# Patient Record
Sex: FEMALE | Race: WHITE | NOT HISPANIC OR LATINO | Employment: OTHER | URBAN - METROPOLITAN AREA
[De-identification: names, ages, dates, MRNs, and addresses within clinical notes are randomized per-mention and may not be internally consistent; named-entity substitution may affect disease eponyms.]

---

## 2017-01-19 ENCOUNTER — HOSPITAL ENCOUNTER (OUTPATIENT)
Dept: OTHER | Facility: HOSPITAL | Age: 72
Discharge: HOME | End: 2017-01-19
Attending: FAMILY MEDICINE | Admitting: FAMILY MEDICINE

## 2018-01-29 ENCOUNTER — HOSPITAL ENCOUNTER (OUTPATIENT)
Dept: OTHER | Facility: HOSPITAL | Age: 73
Discharge: HOME | End: 2018-01-29
Attending: FAMILY MEDICINE | Admitting: FAMILY MEDICINE

## 2018-04-16 ENCOUNTER — OFFICE (OUTPATIENT)
Dept: URBAN - METROPOLITAN AREA CLINIC 64 | Facility: CLINIC | Age: 73
End: 2018-04-16

## 2018-04-16 VITALS
WEIGHT: 160 LBS | HEART RATE: 57 BPM | SYSTOLIC BLOOD PRESSURE: 159 MMHG | HEIGHT: 65 IN | DIASTOLIC BLOOD PRESSURE: 84 MMHG

## 2018-04-16 DIAGNOSIS — Z86.010 PERSONAL HISTORY OF COLONIC POLYPS: ICD-10-CM

## 2018-04-16 DIAGNOSIS — R13.19 OTHER DYSPHAGIA: ICD-10-CM

## 2018-04-16 PROCEDURE — 99203 OFFICE O/P NEW LOW 30 MIN: CPT | Performed by: INTERNAL MEDICINE

## 2018-04-16 RX ORDER — ESOMEPRAZOLE MAGNESIUM 20 MG/1
20 CAPSULE, DELAYED RELEASE ORAL
Qty: 0 | Refills: 0 | Status: COMPLETED
End: 2018-04-16

## 2018-04-16 RX ORDER — OMEPRAZOLE 40 MG/1
40 CAPSULE, DELAYED RELEASE ORAL
Qty: 90 | Refills: 3 | Status: ACTIVE
Start: 2018-04-16

## 2018-05-17 ENCOUNTER — HOSPITAL ENCOUNTER (OUTPATIENT)
Dept: PREOP | Facility: HOSPITAL | Age: 73
Setting detail: HOSPITAL OUTPATIENT SURGERY
Discharge: HOME | End: 2018-05-17
Attending: INTERNAL MEDICINE | Admitting: INTERNAL MEDICINE

## 2018-05-17 ENCOUNTER — ON CAMPUS - OUTPATIENT (OUTPATIENT)
Dept: URBAN - METROPOLITAN AREA HOSPITAL 85 | Facility: HOSPITAL | Age: 73
End: 2018-05-17
Payer: COMMERCIAL

## 2018-05-17 DIAGNOSIS — K44.9 DIAPHRAGMATIC HERNIA WITHOUT OBSTRUCTION OR GANGRENE: ICD-10-CM

## 2018-05-17 DIAGNOSIS — Z86.010 PERSONAL HISTORY OF COLONIC POLYPS: ICD-10-CM

## 2018-05-17 DIAGNOSIS — K21.9 GASTRO-ESOPHAGEAL REFLUX DISEASE WITHOUT ESOPHAGITIS: ICD-10-CM

## 2018-05-17 DIAGNOSIS — K22.2 ESOPHAGEAL OBSTRUCTION: ICD-10-CM

## 2018-05-17 DIAGNOSIS — R13.10 DYSPHAGIA, UNSPECIFIED: ICD-10-CM

## 2018-05-17 DIAGNOSIS — K63.5 POLYP OF COLON: ICD-10-CM

## 2018-05-17 DIAGNOSIS — K62.89 OTHER SPECIFIED DISEASES OF ANUS AND RECTUM: ICD-10-CM

## 2018-05-17 DIAGNOSIS — K57.30 DIVERTICULOSIS OF LARGE INTESTINE WITHOUT PERFORATION OR ABS: ICD-10-CM

## 2018-05-17 DIAGNOSIS — K64.1 SECOND DEGREE HEMORRHOIDS: ICD-10-CM

## 2018-05-17 PROCEDURE — 45380 COLONOSCOPY AND BIOPSY: CPT | Mod: PT | Performed by: INTERNAL MEDICINE

## 2018-05-17 PROCEDURE — 43235 EGD DIAGNOSTIC BRUSH WASH: CPT | Mod: 59 | Performed by: INTERNAL MEDICINE

## 2018-05-17 PROCEDURE — 43450 DILATE ESOPHAGUS 1/MULT PASS: CPT | Performed by: INTERNAL MEDICINE

## 2018-06-26 ENCOUNTER — CONVERSION ENCOUNTER (OUTPATIENT)
Dept: FAMILY MEDICINE CLINIC | Facility: CLINIC | Age: 73
End: 2018-06-26

## 2018-06-26 ENCOUNTER — HOSPITAL ENCOUNTER (OUTPATIENT)
Dept: GENERAL RADIOLOGY | Facility: HOSPITAL | Age: 73
Discharge: HOME | End: 2018-06-26
Attending: FAMILY MEDICINE | Admitting: FAMILY MEDICINE

## 2018-06-26 LAB
ALBUMIN SERPL-MCNC: 4.3 G/DL (ref 3.6–5.1)
ALP SERPL-CCNC: 60 U/L (ref 33–130)
AST SERPL-CCNC: 20 U/L (ref 10–35)
BILIRUB SERPL-MCNC: 0.5 MG/DL (ref 0.2–1.2)
BUN SERPL-MCNC: 17 MG/DL (ref 7–25)
BUN/CREAT SERPL: 12.1 (CALC) (ref 6–22)
CALCIUM SERPL-MCNC: 9.8 MG/DL (ref 8.6–10.2)
CHLORIDE SERPL-SCNC: 104 MMOL/L (ref 98–110)
CONV CO2: 29 MMOL/L (ref 21–33)
CONV TOTAL PROTEIN: 6.4 G/DL (ref 6.2–8.3)
CREAT UR-MCNC: 1.4 MG/DL (ref 0.63–1.22)
GLOBULIN UR ELPH-MCNC: 2.1 MG/DL (ref 2.2–3.9)
GLUCOSE UR QL: 100 MG/DL (ref 65–99)
INSULIN SERPL-ACNC: 2 (CALC) (ref 1–2.1)
POTASSIUM SERPL-SCNC: 4.2 MMOL/L (ref 3.5–5.3)
SODIUM SERPL-SCNC: 140 MMOL/L (ref 135–146)

## 2018-07-12 ENCOUNTER — CONVERSION ENCOUNTER (OUTPATIENT)
Dept: FAMILY MEDICINE CLINIC | Facility: CLINIC | Age: 73
End: 2018-07-12

## 2018-07-13 LAB
ALBUMIN SERPL-MCNC: 4.3 G/DL (ref 3.6–5.1)
ALP SERPL-CCNC: 74 U/L (ref 33–130)
ALT SERPL-CCNC: 16 U/L (ref 6–29)
AST SERPL-CCNC: 26 U/L (ref 10–35)
BILIRUB SERPL-MCNC: 0.3 MG/DL (ref 0.2–1.2)
BUN SERPL-MCNC: 20 MG/DL (ref 7–25)
BUN/CREAT SERPL: 17 (CALC) (ref 6–22)
CALCIUM SERPL-MCNC: 9.3 MG/DL (ref 8.6–10.4)
CHLORIDE SERPL-SCNC: 101 MMOL/L (ref 98–110)
CONV CO2: 27 MMOL/L (ref 20–31)
CONV TOTAL PROTEIN: 6.6 G/DL (ref 6.1–8.1)
CREAT UR-MCNC: 1.21 MG/DL (ref 0.6–0.93)
GLOBULIN UR ELPH-MCNC: 2.3 MG/DL (ref 1.9–3.7)
GLUCOSE UR QL: 92 MG/DL (ref 65–99)
INSULIN SERPL-ACNC: 1.9 (CALC) (ref 1–2.5)
POTASSIUM SERPL-SCNC: 4.2 MMOL/L (ref 3.5–5.3)
SODIUM SERPL-SCNC: 137 MMOL/L (ref 135–146)

## 2018-08-13 ENCOUNTER — CONVERSION ENCOUNTER (OUTPATIENT)
Dept: FAMILY MEDICINE CLINIC | Facility: CLINIC | Age: 73
End: 2018-08-13

## 2018-08-13 LAB
ALBUMIN SERPL-MCNC: 4.3 G/DL (ref 3.6–5.1)
ALP SERPL-CCNC: 58 U/L (ref 33–130)
AST SERPL-CCNC: 22 U/L (ref 10–35)
BILIRUB SERPL-MCNC: 0.6 MG/DL (ref 0.2–1.2)
BUN SERPL-MCNC: 14 MG/DL (ref 7–25)
BUN/CREAT SERPL: 12.7 (CALC) (ref 6–22)
CALCIUM SERPL-MCNC: 9.8 MG/DL (ref 8.6–10.2)
CHLORIDE SERPL-SCNC: 103 MMOL/L (ref 98–110)
CONV CO2: 30 MMOL/L (ref 21–33)
CONV TOTAL PROTEIN: 6.4 G/DL (ref 6.2–8.3)
CREAT UR-MCNC: 1.1 MG/DL (ref 0.63–1.22)
GLOBULIN UR ELPH-MCNC: 2.1 MG/DL (ref 2.2–3.9)
GLUCOSE UR QL: 88 MG/DL (ref 65–99)
INSULIN SERPL-ACNC: 2 (CALC) (ref 1–2.1)
POTASSIUM SERPL-SCNC: 4.1 MMOL/L (ref 3.5–5.3)
SODIUM SERPL-SCNC: 139 MMOL/L (ref 135–146)

## 2019-02-12 ENCOUNTER — HOSPITAL ENCOUNTER (OUTPATIENT)
Dept: OTHER | Facility: HOSPITAL | Age: 74
Discharge: HOME | End: 2019-02-12
Attending: FAMILY MEDICINE | Admitting: FAMILY MEDICINE

## 2019-06-17 RX ORDER — ROSUVASTATIN CALCIUM 5 MG/1
TABLET, COATED ORAL
Qty: 30 TABLET | Refills: 12 | Status: SHIPPED | OUTPATIENT
Start: 2019-06-17 | End: 2020-02-04 | Stop reason: SDUPTHER

## 2019-11-12 ENCOUNTER — FLU SHOT (OUTPATIENT)
Dept: FAMILY MEDICINE CLINIC | Facility: CLINIC | Age: 74
End: 2019-11-12

## 2019-11-12 DIAGNOSIS — Z23 NEED FOR INFLUENZA VACCINATION: ICD-10-CM

## 2019-11-12 PROCEDURE — G0008 ADMIN INFLUENZA VIRUS VAC: HCPCS | Performed by: FAMILY MEDICINE

## 2019-11-12 PROCEDURE — 90653 IIV ADJUVANT VACCINE IM: CPT | Performed by: FAMILY MEDICINE

## 2020-01-28 RX ORDER — ATENOLOL 25 MG/1
TABLET ORAL
Qty: 60 TABLET | Refills: 12 | OUTPATIENT
Start: 2020-01-28

## 2020-02-03 ENCOUNTER — OFFICE VISIT (OUTPATIENT)
Dept: FAMILY MEDICINE CLINIC | Facility: CLINIC | Age: 75
End: 2020-02-03

## 2020-02-03 VITALS
HEIGHT: 65 IN | SYSTOLIC BLOOD PRESSURE: 130 MMHG | WEIGHT: 160.8 LBS | OXYGEN SATURATION: 98 % | RESPIRATION RATE: 18 BRPM | BODY MASS INDEX: 26.79 KG/M2 | HEART RATE: 78 BPM | TEMPERATURE: 97.7 F | DIASTOLIC BLOOD PRESSURE: 70 MMHG

## 2020-02-03 DIAGNOSIS — L98.9 FACIAL LESION: ICD-10-CM

## 2020-02-03 DIAGNOSIS — R35.0 URINARY FREQUENCY: ICD-10-CM

## 2020-02-03 DIAGNOSIS — Z11.59 NEED FOR HEPATITIS C SCREENING TEST: ICD-10-CM

## 2020-02-03 DIAGNOSIS — E66.3 OVERWEIGHT: ICD-10-CM

## 2020-02-03 DIAGNOSIS — G43.809 VARIANTS OF MIGRAINE: ICD-10-CM

## 2020-02-03 DIAGNOSIS — Z12.31 ENCOUNTER FOR SCREENING MAMMOGRAM FOR MALIGNANT NEOPLASM OF BREAST: ICD-10-CM

## 2020-02-03 DIAGNOSIS — E78.2 MIXED HYPERLIPIDEMIA: ICD-10-CM

## 2020-02-03 DIAGNOSIS — R93.1 ELEVATED CORONARY ARTERY CALCIUM SCORE: ICD-10-CM

## 2020-02-03 DIAGNOSIS — K57.30 DIVERTICULOSIS OF LARGE INTESTINE WITHOUT HEMORRHAGE: ICD-10-CM

## 2020-02-03 DIAGNOSIS — Z12.4 CERVICAL CANCER SCREENING: ICD-10-CM

## 2020-02-03 DIAGNOSIS — I10 HYPERTENSION, BENIGN: ICD-10-CM

## 2020-02-03 DIAGNOSIS — M85.89 OSTEOPENIA OF MULTIPLE SITES: ICD-10-CM

## 2020-02-03 DIAGNOSIS — R92.2 DENSE BREAST: ICD-10-CM

## 2020-02-03 DIAGNOSIS — K22.2 GASTROESOPHAGEAL REFLUX DISEASE WITH STRICTURE: ICD-10-CM

## 2020-02-03 DIAGNOSIS — M15.9 GENERALIZED OSTEOARTHROSIS: ICD-10-CM

## 2020-02-03 DIAGNOSIS — N18.30 CHRONIC RENAL FAILURE, STAGE 3 (MODERATE) (HCC): ICD-10-CM

## 2020-02-03 DIAGNOSIS — K21.9 GASTROESOPHAGEAL REFLUX DISEASE WITH STRICTURE: ICD-10-CM

## 2020-02-03 DIAGNOSIS — Z00.00 MEDICARE ANNUAL WELLNESS VISIT, SUBSEQUENT: Primary | ICD-10-CM

## 2020-02-03 DIAGNOSIS — Z12.11 COLON CANCER SCREENING: ICD-10-CM

## 2020-02-03 PROBLEM — K44.9 HIATAL HERNIA: Status: ACTIVE | Noted: 2018-05-17

## 2020-02-03 LAB
BILIRUB BLD-MCNC: NEGATIVE MG/DL
CLARITY, POC: CLEAR
COLOR UR: YELLOW
GLUCOSE UR STRIP-MCNC: NEGATIVE MG/DL
KETONES UR QL: NEGATIVE
LEUKOCYTE EST, POC: NEGATIVE
NITRITE UR-MCNC: NEGATIVE MG/ML
PH UR: 5 [PH] (ref 5–8)
PROT UR STRIP-MCNC: NEGATIVE MG/DL
RBC # UR STRIP: ABNORMAL /UL
SP GR UR: 1.01 (ref 1–1.03)
UROBILINOGEN UR QL: NORMAL

## 2020-02-03 PROCEDURE — 81002 URINALYSIS NONAUTO W/O SCOPE: CPT | Performed by: FAMILY MEDICINE

## 2020-02-03 PROCEDURE — G0444 DEPRESSION SCREEN ANNUAL: HCPCS | Performed by: FAMILY MEDICINE

## 2020-02-03 PROCEDURE — 99213 OFFICE O/P EST LOW 20 MIN: CPT | Performed by: FAMILY MEDICINE

## 2020-02-03 PROCEDURE — 36415 COLL VENOUS BLD VENIPUNCTURE: CPT | Performed by: FAMILY MEDICINE

## 2020-02-03 PROCEDURE — G0439 PPPS, SUBSEQ VISIT: HCPCS | Performed by: FAMILY MEDICINE

## 2020-02-03 RX ORDER — ATENOLOL 25 MG/1
25 TABLET ORAL DAILY
Qty: 90 TABLET | Refills: 4 | Status: SHIPPED | OUTPATIENT
Start: 2020-02-03 | End: 2020-03-09 | Stop reason: SDUPTHER

## 2020-02-03 RX ORDER — ATENOLOL 25 MG/1
1 TABLET ORAL DAILY
COMMUNITY
Start: 2019-12-30 | End: 2020-02-03 | Stop reason: SDUPTHER

## 2020-02-03 RX ORDER — MONTELUKAST SODIUM 10 MG/1
1 TABLET ORAL DAILY
COMMUNITY
Start: 2020-01-27 | End: 2020-05-01

## 2020-02-03 RX ORDER — ASPIRIN 81 MG/1
81 TABLET ORAL DAILY
COMMUNITY

## 2020-02-03 RX ORDER — CETIRIZINE HYDROCHLORIDE 10 MG/1
10 TABLET ORAL EVERY EVENING
COMMUNITY

## 2020-02-03 RX ORDER — OMEPRAZOLE 40 MG/1
40 CAPSULE, DELAYED RELEASE ORAL EVERY OTHER DAY
COMMUNITY
End: 2020-02-03

## 2020-02-03 RX ORDER — OMEPRAZOLE 20 MG/1
20 CAPSULE, DELAYED RELEASE ORAL DAILY
Qty: 90 CAPSULE | Refills: 4 | Status: SHIPPED | OUTPATIENT
Start: 2020-02-03 | End: 2021-05-04

## 2020-02-03 RX ORDER — LISINOPRIL 20 MG/1
1 TABLET ORAL DAILY
COMMUNITY
Start: 2020-01-27 | End: 2020-05-01

## 2020-02-03 RX ORDER — HYDROCHLOROTHIAZIDE 25 MG/1
1 TABLET ORAL DAILY
COMMUNITY
Start: 2020-01-07 | End: 2020-06-02

## 2020-02-03 NOTE — PROGRESS NOTES
The ABCs of the Annual Wellness Visit  Subsequent Medicare Wellness Visit    Chief Complaint   Patient presents with   • Medicare Wellness-subsequent       Subjective   Akosua Munguia is a 74 y.o. female who presents for a Subsequent Medicare Wellness Visit. The patient is here: for coordination of medical care to discuss health maintenance and disease prevention to follow up on multiple medical problems. Overall has: moderate activity with work/home activities, exercises 2 - 3 times per week, good appetite, feels well with no complaints, good energy level and is sleeping well. Nutrition: balanced diet. Last tetanus shot was 2014.     Chronic Kidney Disease   This is a chronic problem. The current episode started more than 1 year ago. Pertinent negatives include no abdominal pain, arthralgias, chest pain, congestion, coughing, fatigue, fever, headaches, joint swelling, myalgias, nausea, numbness, rash, sore throat, vomiting or weakness. The treatment provided significant relief.   Hypertension   This is a chronic problem. The current episode started more than 1 year ago. The problem is controlled. Pertinent negatives include no chest pain, headaches, palpitations or shortness of breath. Risk factors for coronary artery disease include post-menopausal state and dyslipidemia. Current antihypertension treatment includes beta blockers, diuretics and ACE inhibitors. The current treatment provides significant improvement.   Hyperlipidemia   This is a chronic problem. The current episode started more than 1 year ago. The problem is controlled. She has no history of diabetes or obesity. Pertinent negatives include no chest pain, myalgias or shortness of breath. Current antihyperlipidemic treatment includes statins. The current treatment provides significant improvement of lipids. Risk factors for coronary artery disease include dyslipidemia, hypertension and post-menopausal.       HEALTH RISK ASSESSMENT    Recent  Hospitalizations:  No hospitalization(s) within the last year.    Current Medical Providers:  Patient Care Team:  Jenny Keenan MD as PCP - General  Jenny Keenan MD as PCP - Claims Attributed    Smoking Status:  Social History     Tobacco Use   Smoking Status Never Smoker   Smokeless Tobacco Never Used   Tobacco Comment    No Smoke Exposure       Alcohol Consumption:  Social History     Substance and Sexual Activity   Alcohol Use Not Currently       Depression Screen:   PHQ-2/PHQ-9 Depression Screening 2/3/2020   Little interest or pleasure in doing things 0   Feeling down, depressed, or hopeless 0   Trouble falling or staying asleep, or sleeping too much 0   Feeling tired or having little energy 0   Poor appetite or overeating 0   Feeling bad about yourself - or that you are a failure or have let yourself or your family down 0   Trouble concentrating on things, such as reading the newspaper or watching television 0   Moving or speaking so slowly that other people could have noticed. Or the opposite - being so fidgety or restless that you have been moving around a lot more than usual 0   Thoughts that you would be better off dead, or of hurting yourself in some way 0   Total Score 0   If you checked off any problems, how difficult have these problems made it for you to do your work, take care of things at home, or get along with other people? Not difficult at all     I spent more than 16 minutes asking patient questions, counseling and documenting in the chart    Fall Risk Screen:  STEADI Fall Risk Assessment was completed, and patient is at LOW risk for falls.Assessment completed on:2/3/2020    Health Habits and Functional and Cognitive Screening:  Functional & Cognitive Status 2/3/2020   Do you have difficulty preparing food and eating? No   Do you have difficulty bathing yourself, getting dressed or grooming yourself? No   Do you have difficulty using the toilet? No   Do you have difficulty moving around  from place to place? No   Do you have trouble with steps or getting out of a bed or a chair? No   Current Diet Well Balanced Diet   Dental Exam Up to date   Eye Exam Not up to date   Exercise (times per week) 3 times per week   Current Exercise Activities Include Walking   Do you need help using the phone?  No   Are you deaf or do you have serious difficulty hearing?  No   Do you need help with transportation? No   Do you need help shopping? No   Do you need help preparing meals?  No   Do you need help with housework?  No   Do you need help with laundry? No   Do you need help taking your medications? No   Do you need help managing money? No   Do you ever drive or ride in a car without wearing a seat belt? No   Have you felt unusual stress, anger or loneliness in the last month? No   Who do you live with? Spouse   If you need help, do you have trouble finding someone available to you? No   Have you been bothered in the last four weeks by sexual problems? No   Do you have difficulty concentrating, remembering or making decisions? No       Does the patient have evidence of cognitive impairment? No    Asprin use counseling:Taking ASA appropriately as indicated    Recent Lab Results:  Lab Results   Component Value Date     (H) 02/03/2020    CHLPL 210 (H) 02/03/2020    TRIG 195 (H) 02/03/2020    HDL 63 02/03/2020     (H) 02/03/2020    VLDL 39 02/03/2020        Age-appropriate Screening Schedule:  Refer to the list below for future screening recommendations based on patient's age, sex and/or medical conditions. Orders for these recommended tests are listed in the plan section. The patient has been provided with a written plan.    Health Maintenance   Topic Date Due   • LIPID PANEL  02/03/2021   • MAMMOGRAM  02/12/2021   • TDAP/TD VACCINES (2 - Td) 11/05/2024   • COLONOSCOPY  05/17/2028   • PNEUMOCOCCAL VACCINE (65+ HIGH RISK)  Completed   • INFLUENZA VACCINE  Completed   • ZOSTER VACCINE  Completed           The following portions of the patient's history were reviewed and updated as appropriate: allergies, current medications, past family history, past medical history, past social history, past surgical history and problem list.    Outpatient Medications Prior to Visit   Medication Sig Dispense Refill   • aspirin 81 MG EC tablet Take 81 mg by mouth Daily.     • cetirizine (zyrTEC) 10 MG tablet Take 10 mg by mouth Daily.     • hydroCHLOROthiazide (HYDRODIURIL) 25 MG tablet Take 1 tablet by mouth Daily.     • lisinopril (PRINIVIL,ZESTRIL) 20 MG tablet Take 1 tablet by mouth Daily.     • Mirabegron ER (MYRBETRIQ) 50 MG tablet sustained-release 24 hour 24 hr tablet Take 50 mg by mouth Every Other Day.     • montelukast (SINGULAIR) 10 MG tablet Take 1 tablet by mouth Daily.     • atenolol (TENORMIN) 25 MG tablet Take 1 tablet by mouth Daily.     • omeprazole (priLOSEC) 40 MG capsule Take 40 mg by mouth Every Other Day.     • rosuvastatin (CRESTOR) 5 MG tablet TAKE ONE TABLET BY MOUTH EVERY OTHER DAY FOR CHOLESTEROL 30 tablet 12     No facility-administered medications prior to visit.        Patient Active Problem List   Diagnosis   • Acute seasonal allergic rhinitis due to pollen   • Asymptomatic menopause   • Cervical disc disorder with myelopathy   • Chronic renal failure, stage 3 (moderate) (CMS/HCC)   • Dense breast   • Diverticulosis of large intestine without hemorrhage   • Gastroesophageal reflux disease with stricture   • Generalized osteoarthrosis   • Hiatal hernia   • History of kidney stones   • Hypertension, benign   • Insomnia, organic   • Lumbar disc disorder with myelopathy   • Mixed hyperlipidemia   • Osteopenia of multiple sites   • Overweight   • Urinary incontinence in female   • Variants of migraine   • Medicare annual wellness visit, subsequent   • Encounter for screening mammogram for malignant neoplasm of breast   • Colon cancer screening   • Cervical cancer screening   • Elevated coronary artery  calcium score       Advanced Care Planning:  Patient has an advance directive - a copy has been provided and is visible in patient header    A face-to-face visit was completed today with patient.  Counseling explanation, and discussion of advanced directives was performed.   The last advanced care visit was performed in 2019.  In a near life ending situation, from which she is not expected to recover functionally, and she is not able to speak for herself, she does not want life sustaining measures. We discussed feeding tubes, ventilators and cardiac support as well as dialysis.    I spent less than 16 minutes discussing Advanced Care Planning information and documenting in the chart.    Review of Systems   Constitutional: Negative for activity change, appetite change, fatigue, fever and unexpected weight change.   HENT: Negative for congestion, hearing loss, rhinorrhea, sinus pain, sore throat, tinnitus, trouble swallowing and voice change.    Eyes: Negative for visual disturbance.   Respiratory: Negative for apnea, cough, shortness of breath and wheezing.    Cardiovascular: Negative for chest pain and palpitations.   Gastrointestinal: Negative for abdominal pain, blood in stool, constipation, diarrhea, nausea and vomiting.   Endocrine: Negative for cold intolerance, heat intolerance, polydipsia and polyuria.   Genitourinary: Negative for difficulty urinating, dysuria, flank pain, frequency and hematuria.   Musculoskeletal: Negative for arthralgias, joint swelling and myalgias.   Skin: Negative for rash.        There is a lesion on her right cheek that has been present x 1 yr, it is increasing in size it is flesh in color, she is very blue eyed and [place]    Allergic/Immunologic: Negative for environmental allergies and immunocompromised state.   Neurological: Negative for dizziness, syncope, weakness, numbness and headaches.   Hematological: Negative for adenopathy. Does not  "bruise/bleed easily.   Psychiatric/Behavioral: Negative for dysphoric mood and suicidal ideas. The patient is not nervous/anxious.        Compared to one year ago, the patient feels her physical health is the same.  Compared to one year ago, the patient feels her mental health is the same.    Reviewed chart for potential of high risk medication in the elderly: yes  Reviewed chart for potential of harmful drug interactions in the elderly:yes    Objective      Vitals:    02/03/20 0853   BP: 130/70   BP Location: Left arm   Patient Position: Sitting   Cuff Size: Adult   Pulse: 78   Resp: 18   Temp: 97.7 °F (36.5 °C)   TempSrc: Oral   SpO2: 98%   Weight: 72.9 kg (160 lb 12.8 oz)   Height: 165.1 cm (65\")       Body mass index is 26.76 kg/m².  Discussed the patient's BMI with her. The BMI is in the acceptable range.    Physical Exam   Constitutional: She is oriented to person, place, and time. She appears well-developed and well-nourished. No distress.   HENT:   Head: Normocephalic. Hair is normal.   Right Ear: Tympanic membrane and external ear normal.   Left Ear: Tympanic membrane and external ear normal.   Nose: Nose normal. No mucosal edema.   Mouth/Throat: Uvula is midline, oropharynx is clear and moist and mucous membranes are normal.   Eyes: Pupils are equal, round, and reactive to light. Conjunctivae and EOM are normal. Right eye exhibits no discharge. Left eye exhibits no discharge.   Fundi benign   Neck: Normal range of motion. Neck supple. No JVD present. No muscular tenderness present. No thyromegaly present.   Cardiovascular: Normal rate, regular rhythm and normal heart sounds. PMI is not displaced. Exam reveals no gallop and no friction rub.   No murmur heard.  Pulses:       Carotid pulses are 2+ on the right side, and 2+ on the left side.       Femoral pulses are 2+ on the right side, and 2+ on the left side.       Dorsalis pedis pulses are 2+ on the right side, and 2+ on the left side.   Pulmonary/Chest: " Effort normal and breath sounds normal. No respiratory distress. She has no decreased breath sounds. She has no wheezes. She has no rhonchi. She has no rales. Right breast exhibits no inverted nipple, no mass, no nipple discharge, no skin change and no tenderness. Left breast exhibits no inverted nipple, no mass, no nipple discharge, no skin change and no tenderness. No breast bleeding. Breasts are symmetrical.   Abdominal: Soft. Bowel sounds are normal. She exhibits no distension, no abdominal bruit and no mass. There is no hepatosplenomegaly. There is no CVA tenderness. Hernia confirmed negative in the right inguinal area and confirmed negative in the left inguinal area.   Musculoskeletal: Normal range of motion. She exhibits no edema, tenderness or deformity.   Lymphadenopathy:     She has no cervical adenopathy.        Right: No inguinal and no supraclavicular adenopathy present.        Left: No inguinal and no supraclavicular adenopathy present.   Neurological: She is alert and oriented to person, place, and time. She has normal strength and normal reflexes. She displays normal reflexes. No cranial nerve deficit or sensory deficit. She exhibits normal muscle tone. Coordination normal.   Skin: No ecchymosis, no lesion and no rash noted. No erythema.   There is a 4 mm irregular flesh colored lesion over the right medial cheek at the nasal fold.   Psychiatric: She has a normal mood and affect. Her speech is normal and behavior is normal. Judgment and thought content normal. Cognition and memory are normal. She does not express impulsivity. She expresses no suicidal ideation. She exhibits normal recent memory and normal remote memory.       Assessment/Plan   Medicare Risks and Personalized Health Plan  CMS Preventative Services Quick Reference  Osteoprorosis Risk    The above risks/problems have been discussed with the patient.  Pertinent information has been shared with the patient in the After Visit  Summary.  Follow up plans and orders are seen below in the Assessment/Plan Section.    Problem List Items Addressed This Visit        Unprioritized    Chronic renal failure, stage 3 (moderate) (CMS/HCC)    Overview     Creat 1.04 GFR 52  02/03/2020, stable.   GFR 30-59         Relevant Medications    hydroCHLOROthiazide (HYDRODIURIL) 25 MG tablet    Dense breast    Overview     Akosua Munguia's 5 year risk of having breast cancer is 3.2%. The average woman at age 74 y.o. has a risk of 2.2% of having breast cancer.  Akosua Munguia's life time risk of having breast cancer up to age 90 is 7.2%. The average woman's chance of having breast cancer up to the age of 90 is 5.1%.    We discussed the risk assessment values with Akosua Munguia, she understands that she is at more than average risk of developing breast cancer at 5 years and over her life time than the average woman of her age. She does not desire to have genetic testing or further work up at this time.            Diverticulosis of large intestine without hemorrhage    Overview     No sxs          Gastroesophageal reflux disease with stricture    Overview     minimal grade A with distal esophageal stricture dilated to 52 Thai Dr. Casillas 2018  Sx are stable on meds.          Relevant Medications    omeprazole (priLOSEC) 20 MG capsule    Generalized osteoarthrosis    Overview     Stable with tylenol she avoid ant inflammatories given renal function         Hypertension, benign    Overview     Controlled. She is compliant [facility] meds.          Current Assessment & Plan     Hypertension is improving with treatment.  Continue current treatment regimen.  Dietary sodium restriction.  Weight loss.  Regular aerobic exercise.  Blood pressure will be reassessed  1 year.         Relevant Medications    lisinopril (PRINIVIL,ZESTRIL) 20 MG tablet    hydroCHLOROthiazide (HYDRODIURIL) 25 MG tablet    atenolol (TENORMIN) 25 MG tablet    Other Relevant Orders    CBC & Differential  (Completed)    Comprehensive Metabolic Panel (Completed)    TSH (Completed)    Mixed hyperlipidemia    Overview     Stable she is compliant with meds.          Current Assessment & Plan     Lipid abnormalities are improving with treatment.  Nutritional counseling was provided. and Pharmacotherapy as ordered.  Lipids will be reassessed in 1 year.         Relevant Orders    Lipid Panel With / Chol / HDL Ratio (Completed)    Osteopenia of multiple sites    Overview     Continue calcium 1200 mg with vitamin D and wt bearing exercise.   -0.8 spine,-0.8 FN,-0.2 hip, 2017   -1.2 Spine, 0.5 FN,--0.8 Hip-2014         Overweight    Overview     Diet and exercise discussed.          Variants of migraine    Overview     without mention of intractable migraine  Stable on beta blockers with no recent break thru         Relevant Medications    aspirin 81 MG EC tablet    atenolol (TENORMIN) 25 MG tablet    Medicare annual wellness visit, subsequent - Primary    Overview     Diet exercise breast self exams seat belts, fall risk, sunscreens discussed and encouraged. Previous lab reviewed in detail. We notified her of today's lab.          Relevant Orders    POCT urinalysis dipstick, manual (Completed)    Encounter for screening mammogram for malignant neoplasm of breast    Overview     Last mammogram 02/12/2019          Colon cancer screening    Overview     Last colonoscopy 05/17/2018. Repeat 2023         Cervical cancer screening    Overview     Last pap smear 12/09/2013 negative  S/p hysterectomy due to ovarian cysts         Elevated coronary artery calcium score    Overview     119 in LAD remainder are 0  Continue statin Rx dose increased. Follow with repeat in severeal years.          Relevant Medications    atenolol (TENORMIN) 25 MG tablet      Other Visit Diagnoses     Facial lesion        Relevant Orders    Ambulatory Referral to Dermatology    Urinary frequency        Relevant Orders    Urinalysis With Microscopic - Urine,  Clean Catch (Completed)    Need for hepatitis C screening test        Relevant Orders    Hepatitis C antibody (Completed)        Follow Up:  Return in about 1 year (around 2/3/2021).     An After Visit Summary and PPPS were given to the patient.        Site care done- cleaned with alcohol swab, procedure tolerated well, dressing applied. Venipuncture was obtained after 1 time(s). 2 tubes were drawn. Needle kenny used was 22.

## 2020-02-04 ENCOUNTER — TELEPHONE (OUTPATIENT)
Dept: FAMILY MEDICINE CLINIC | Facility: CLINIC | Age: 75
End: 2020-02-04

## 2020-02-04 DIAGNOSIS — E78.2 MIXED HYPERLIPIDEMIA: Primary | ICD-10-CM

## 2020-02-04 LAB
ALBUMIN SERPL-MCNC: 4.6 G/DL (ref 3.7–4.7)
ALBUMIN/GLOB SERPL: 1.8 {RATIO} (ref 1.2–2.2)
ALP SERPL-CCNC: 69 IU/L (ref 39–117)
ALT SERPL-CCNC: 22 IU/L (ref 0–32)
APPEARANCE UR: CLEAR
AST SERPL-CCNC: 23 IU/L (ref 0–40)
BACTERIA #/AREA URNS HPF: NORMAL /[HPF]
BASOPHILS # BLD AUTO: 0.1 X10E3/UL (ref 0–0.2)
BASOPHILS NFR BLD AUTO: 1 %
BILIRUB SERPL-MCNC: 0.4 MG/DL (ref 0–1.2)
BILIRUB UR QL STRIP: NEGATIVE
BUN SERPL-MCNC: 17 MG/DL (ref 8–27)
BUN/CREAT SERPL: 17 (ref 12–28)
CALCIUM SERPL-MCNC: 10 MG/DL (ref 8.7–10.3)
CHLORIDE SERPL-SCNC: 102 MMOL/L (ref 96–106)
CHOLEST SERPL-MCNC: 210 MG/DL (ref 100–199)
CHOLEST/HDLC SERPL: 3.3 RATIO (ref 0–4.4)
CO2 SERPL-SCNC: 24 MMOL/L (ref 20–29)
COLOR UR: YELLOW
CREAT SERPL-MCNC: 1.02 MG/DL (ref 0.57–1)
EOSINOPHIL # BLD AUTO: 0.1 X10E3/UL (ref 0–0.4)
EOSINOPHIL NFR BLD AUTO: 2 %
EPI CELLS #/AREA URNS HPF: NORMAL /HPF (ref 0–10)
ERYTHROCYTE [DISTWIDTH] IN BLOOD BY AUTOMATED COUNT: 12.7 % (ref 11.7–15.4)
GLOBULIN SER CALC-MCNC: 2.6 G/DL (ref 1.5–4.5)
GLUCOSE SERPL-MCNC: 108 MG/DL (ref 65–99)
GLUCOSE UR QL: NEGATIVE
HCT VFR BLD AUTO: 42 % (ref 34–46.6)
HCV AB S/CO SERPL IA: <0.1 S/CO RATIO (ref 0–0.9)
HDLC SERPL-MCNC: 63 MG/DL
HGB BLD-MCNC: 14.3 G/DL (ref 11.1–15.9)
HGB UR QL STRIP: NEGATIVE
IMM GRANULOCYTES # BLD AUTO: 0 X10E3/UL (ref 0–0.1)
IMM GRANULOCYTES NFR BLD AUTO: 0 %
KETONES UR QL STRIP: NEGATIVE
LDLC SERPL CALC-MCNC: 108 MG/DL (ref 0–99)
LEUKOCYTE ESTERASE UR QL STRIP: NEGATIVE
LYMPHOCYTES # BLD AUTO: 1 X10E3/UL (ref 0.7–3.1)
LYMPHOCYTES NFR BLD AUTO: 21 %
MCH RBC QN AUTO: 31.8 PG (ref 26.6–33)
MCHC RBC AUTO-ENTMCNC: 34 G/DL (ref 31.5–35.7)
MCV RBC AUTO: 93 FL (ref 79–97)
MICRO URNS: NORMAL
MICRO URNS: NORMAL
MONOCYTES # BLD AUTO: 0.6 X10E3/UL (ref 0.1–0.9)
MONOCYTES NFR BLD AUTO: 11 %
MUCOUS THREADS URNS QL MICRO: PRESENT
NEUTROPHILS # BLD AUTO: 3.3 X10E3/UL (ref 1.4–7)
NEUTROPHILS NFR BLD AUTO: 65 %
NITRITE UR QL STRIP: NEGATIVE
PH UR STRIP: 6 [PH] (ref 5–7.5)
PLATELET # BLD AUTO: 243 X10E3/UL (ref 150–450)
POTASSIUM SERPL-SCNC: 4 MMOL/L (ref 3.5–5.2)
PROT SERPL-MCNC: 7.2 G/DL (ref 6–8.5)
PROT UR QL STRIP: NEGATIVE
RBC # BLD AUTO: 4.5 X10E6/UL (ref 3.77–5.28)
RBC #/AREA URNS HPF: NORMAL /HPF (ref 0–2)
SODIUM SERPL-SCNC: 143 MMOL/L (ref 134–144)
SP GR UR: 1.02 (ref 1–1.03)
TRIGL SERPL-MCNC: 195 MG/DL (ref 0–149)
TSH SERPL DL<=0.005 MIU/L-ACNC: 3.36 UIU/ML (ref 0.45–4.5)
UROBILINOGEN UR STRIP-MCNC: 0.2 MG/DL (ref 0.2–1)
VLDLC SERPL CALC-MCNC: 39 MG/DL (ref 5–40)
WBC # BLD AUTO: 5.1 X10E3/UL (ref 3.4–10.8)
WBC #/AREA URNS HPF: NORMAL /HPF (ref 0–5)

## 2020-02-04 RX ORDER — ROSUVASTATIN CALCIUM 5 MG/1
5 TABLET, COATED ORAL DAILY
Qty: 90 TABLET | Refills: 3 | Status: SHIPPED | OUTPATIENT
Start: 2020-02-04 | End: 2020-03-24

## 2020-02-04 NOTE — TELEPHONE ENCOUNTER
[facility]  Appointment arrranged with Dr Santana 3/4/2020  At 1030 am [address]

## 2020-02-04 NOTE — TELEPHONE ENCOUNTER
Spoke with Naty, per Dr Keenan CBC no anemia, white count is normal. Glucose ,kidneys, electrolytes .liver and thyroid are normal.  Hepatitis C was negative. Cholesterol 213 triglycerides  230 HDL 63 LDl 104 cardiac ratio 3.4. Continue to diet, exercise, weight control and repeat  labs  one year.

## 2020-02-04 NOTE — TELEPHONE ENCOUNTER
Spoke with Akosua, per Dr Keeann, CBC no anemia, white count is normal. Glucose 108 , normal is 99 or less, work on diet. Kidneys are stable, being on HCTZ, continue to push fluids,no ibuprofen, aleve or advil. Electrolytes, liver and thyroid are normal.Hepatitis C was negative. Cholesterol  210, triglycerides 195  HDL 63  cardiac ratio 3.3. With the result of yourcardiac calcium scoring you could benefit from increasing crestor, as tolerated.

## 2020-02-05 DIAGNOSIS — Z12.31 SCREENING MAMMOGRAM, ENCOUNTER FOR: Primary | ICD-10-CM

## 2020-02-09 NOTE — ASSESSMENT & PLAN NOTE
Hypertension is improving with treatment.  Continue current treatment regimen.  Dietary sodium restriction.  Weight loss.  Regular aerobic exercise.  Blood pressure will be reassessed  1 year.

## 2020-02-11 ENCOUNTER — HOSPITAL ENCOUNTER (OUTPATIENT)
Dept: MAMMOGRAPHY | Facility: HOSPITAL | Age: 75
Discharge: HOME | End: 2020-02-11
Admitting: FAMILY MEDICINE

## 2020-02-11 DIAGNOSIS — Z12.31 SCREENING MAMMOGRAM, ENCOUNTER FOR: ICD-10-CM

## 2020-02-11 PROCEDURE — 77067 SCR MAMMO BI INCL CAD: CPT

## 2020-02-11 PROCEDURE — 77063 BREAST TOMOSYNTHESIS BI: CPT

## 2020-02-12 ENCOUNTER — APPOINTMENT (OUTPATIENT)
Dept: MAMMOGRAPHY | Facility: HOSPITAL | Age: 75
End: 2020-02-12

## 2020-03-09 ENCOUNTER — TELEPHONE (OUTPATIENT)
Dept: FAMILY MEDICINE CLINIC | Facility: CLINIC | Age: 75
End: 2020-03-09

## 2020-03-09 DIAGNOSIS — I10 HYPERTENSION, BENIGN: ICD-10-CM

## 2020-03-09 RX ORDER — ATENOLOL 25 MG/1
25 TABLET ORAL 2 TIMES DAILY
Qty: 180 TABLET | Refills: 4 | Status: SHIPPED | OUTPATIENT
Start: 2020-03-09 | End: 2021-03-22

## 2020-03-09 NOTE — TELEPHONE ENCOUNTER
Pt called she is needing her attenol to be twice a for 90 days. When it was sent to [facility] it was only once a day. She can be reached on her cell at [phone]

## 2020-03-23 ENCOUNTER — TELEPHONE (OUTPATIENT)
Dept: FAMILY MEDICINE CLINIC | Facility: CLINIC | Age: 75
End: 2020-03-23

## 2020-03-24 ENCOUNTER — TELEPHONE (OUTPATIENT)
Dept: FAMILY MEDICINE CLINIC | Facility: CLINIC | Age: 75
End: 2020-03-24

## 2020-03-24 DIAGNOSIS — E78.2 MIXED HYPERLIPIDEMIA: Primary | ICD-10-CM

## 2020-03-24 NOTE — TELEPHONE ENCOUNTER
Akosua requested a cholesterol  Medication that she can take daily, has stopped crestor due to joint /muscle pain.      Per Dr Shlomo diego 2 mg daily was sent to  [facility]  We will get labs within the next 3 months.

## 2020-03-25 ENCOUNTER — TELEPHONE (OUTPATIENT)
Dept: FAMILY MEDICINE CLINIC | Facility: CLINIC | Age: 75
End: 2020-03-25

## 2020-03-25 NOTE — TELEPHONE ENCOUNTER
Akosua called reports that she will not be able to use livalo  due to cost  325.00 monthly' she question if there is another medication she may try.         Called left message per Dr Keenan unfortunately, there is no medication, can take the crestor that she has used in the past   Try 4 time a week.

## 2020-04-29 DIAGNOSIS — I10 HYPERTENSION, BENIGN: Primary | ICD-10-CM

## 2020-04-29 DIAGNOSIS — J30.1 ACUTE SEASONAL ALLERGIC RHINITIS DUE TO POLLEN: ICD-10-CM

## 2020-05-01 RX ORDER — MONTELUKAST SODIUM 10 MG/1
TABLET ORAL
Qty: 90 TABLET | Refills: 3 | Status: SHIPPED | OUTPATIENT
Start: 2020-05-01 | End: 2020-05-04

## 2020-05-01 RX ORDER — LISINOPRIL 20 MG/1
TABLET ORAL
Qty: 90 TABLET | Refills: 4 | Status: SHIPPED | OUTPATIENT
Start: 2020-05-01 | End: 2020-05-04 | Stop reason: SDUPTHER

## 2020-05-04 DIAGNOSIS — I10 HYPERTENSION, BENIGN: ICD-10-CM

## 2020-05-04 DIAGNOSIS — J30.1 ACUTE SEASONAL ALLERGIC RHINITIS DUE TO POLLEN: ICD-10-CM

## 2020-05-04 RX ORDER — MONTELUKAST SODIUM 10 MG/1
TABLET ORAL
Qty: 90 TABLET | Refills: 4 | Status: SHIPPED | OUTPATIENT
Start: 2020-05-04 | End: 2021-05-12

## 2020-05-04 RX ORDER — LISINOPRIL 20 MG/1
20 TABLET ORAL DAILY
Qty: 90 TABLET | Refills: 4 | Status: SHIPPED | OUTPATIENT
Start: 2020-05-04 | End: 2020-05-04

## 2020-05-04 RX ORDER — LISINOPRIL 20 MG/1
TABLET ORAL
Qty: 90 TABLET | Refills: 4 | Status: SHIPPED | OUTPATIENT
Start: 2020-05-04 | End: 2021-05-04

## 2020-06-02 RX ORDER — HYDROCHLOROTHIAZIDE 25 MG/1
25 TABLET ORAL DAILY
Qty: 30 TABLET | Refills: 12 | Status: SHIPPED | OUTPATIENT
Start: 2020-06-02 | End: 2021-07-06

## 2020-12-21 ENCOUNTER — OFFICE VISIT (OUTPATIENT)
Dept: FAMILY MEDICINE CLINIC | Facility: CLINIC | Age: 75
End: 2020-12-21

## 2020-12-21 VITALS
HEIGHT: 65 IN | BODY MASS INDEX: 25.56 KG/M2 | SYSTOLIC BLOOD PRESSURE: 120 MMHG | OXYGEN SATURATION: 97 % | HEART RATE: 66 BPM | DIASTOLIC BLOOD PRESSURE: 70 MMHG | RESPIRATION RATE: 18 BRPM | TEMPERATURE: 97.3 F | WEIGHT: 153.4 LBS

## 2020-12-21 DIAGNOSIS — E66.3 OVERWEIGHT: ICD-10-CM

## 2020-12-21 DIAGNOSIS — R53.81 MALAISE AND FATIGUE: ICD-10-CM

## 2020-12-21 DIAGNOSIS — R53.83 MALAISE AND FATIGUE: ICD-10-CM

## 2020-12-21 DIAGNOSIS — I10 HYPERTENSION, BENIGN: ICD-10-CM

## 2020-12-21 DIAGNOSIS — Z86.16 HISTORY OF 2019 NOVEL CORONAVIRUS DISEASE (COVID-19): ICD-10-CM

## 2020-12-21 DIAGNOSIS — R51.9 NONINTRACTABLE HEADACHE, UNSPECIFIED CHRONICITY PATTERN, UNSPECIFIED HEADACHE TYPE: Primary | ICD-10-CM

## 2020-12-21 DIAGNOSIS — R11.0 NAUSEA: ICD-10-CM

## 2020-12-21 PROCEDURE — 36415 COLL VENOUS BLD VENIPUNCTURE: CPT | Performed by: FAMILY MEDICINE

## 2020-12-21 PROCEDURE — 99214 OFFICE O/P EST MOD 30 MIN: CPT | Performed by: FAMILY MEDICINE

## 2020-12-21 RX ORDER — HYDROCODONE BITARTRATE AND ACETAMINOPHEN 5; 325 MG/1; MG/1
1 TABLET ORAL EVERY 8 HOURS PRN
Qty: 30 TABLET | Refills: 0 | Status: SHIPPED | OUTPATIENT
Start: 2020-12-21 | End: 2021-03-26

## 2020-12-21 RX ORDER — PROMETHAZINE HYDROCHLORIDE 25 MG/1
25 TABLET ORAL EVERY 6 HOURS PRN
Qty: 20 TABLET | Refills: 0 | Status: SHIPPED | OUTPATIENT
Start: 2020-12-21 | End: 2021-03-26

## 2020-12-22 LAB
ALBUMIN SERPL-MCNC: 4.2 G/DL (ref 3.7–4.7)
ALBUMIN/GLOB SERPL: 1.6 {RATIO} (ref 1.2–2.2)
ALP SERPL-CCNC: 82 IU/L (ref 39–117)
ALT SERPL-CCNC: 11 IU/L (ref 0–32)
AST SERPL-CCNC: 19 IU/L (ref 0–40)
BASOPHILS # BLD AUTO: 0 X10E3/UL (ref 0–0.2)
BASOPHILS NFR BLD AUTO: 1 %
BILIRUB SERPL-MCNC: 0.3 MG/DL (ref 0–1.2)
BUN SERPL-MCNC: 18 MG/DL (ref 8–27)
BUN/CREAT SERPL: 16 (ref 12–28)
CALCIUM SERPL-MCNC: 9.1 MG/DL (ref 8.7–10.3)
CHLORIDE SERPL-SCNC: 101 MMOL/L (ref 96–106)
CO2 SERPL-SCNC: 25 MMOL/L (ref 20–29)
CREAT SERPL-MCNC: 1.1 MG/DL (ref 0.57–1)
EOSINOPHIL # BLD AUTO: 0.1 X10E3/UL (ref 0–0.4)
EOSINOPHIL NFR BLD AUTO: 1 %
ERYTHROCYTE [DISTWIDTH] IN BLOOD BY AUTOMATED COUNT: 12.7 % (ref 11.7–15.4)
GLOBULIN SER CALC-MCNC: 2.7 G/DL (ref 1.5–4.5)
GLUCOSE SERPL-MCNC: 108 MG/DL (ref 65–99)
HCT VFR BLD AUTO: 37.7 % (ref 34–46.6)
HGB BLD-MCNC: 12.9 G/DL (ref 11.1–15.9)
IMM GRANULOCYTES # BLD AUTO: 0 X10E3/UL (ref 0–0.1)
IMM GRANULOCYTES NFR BLD AUTO: 0 %
LYMPHOCYTES # BLD AUTO: 0.8 X10E3/UL (ref 0.7–3.1)
LYMPHOCYTES NFR BLD AUTO: 13 %
MCH RBC QN AUTO: 32.5 PG (ref 26.6–33)
MCHC RBC AUTO-ENTMCNC: 34.2 G/DL (ref 31.5–35.7)
MCV RBC AUTO: 95 FL (ref 79–97)
MONOCYTES # BLD AUTO: 0.5 X10E3/UL (ref 0.1–0.9)
MONOCYTES NFR BLD AUTO: 8 %
NEUTROPHILS # BLD AUTO: 4.7 X10E3/UL (ref 1.4–7)
NEUTROPHILS NFR BLD AUTO: 77 %
PLATELET # BLD AUTO: 280 X10E3/UL (ref 150–450)
POTASSIUM SERPL-SCNC: 4 MMOL/L (ref 3.5–5.2)
PROT SERPL-MCNC: 6.9 G/DL (ref 6–8.5)
RBC # BLD AUTO: 3.97 X10E6/UL (ref 3.77–5.28)
SODIUM SERPL-SCNC: 139 MMOL/L (ref 134–144)
TSH SERPL DL<=0.005 MIU/L-ACNC: 1.95 UIU/ML (ref 0.45–4.5)
WBC # BLD AUTO: 6.1 X10E3/UL (ref 3.4–10.8)

## 2021-03-22 DIAGNOSIS — I10 HYPERTENSION, BENIGN: ICD-10-CM

## 2021-03-22 RX ORDER — ATENOLOL 25 MG/1
TABLET ORAL
Qty: 180 TABLET | Refills: 4 | Status: SHIPPED | OUTPATIENT
Start: 2021-03-22 | End: 2022-03-28 | Stop reason: SDUPTHER

## 2021-03-26 ENCOUNTER — OFFICE VISIT (OUTPATIENT)
Dept: FAMILY MEDICINE CLINIC | Facility: CLINIC | Age: 76
End: 2021-03-26

## 2021-03-26 VITALS
HEIGHT: 65 IN | WEIGHT: 159.6 LBS | OXYGEN SATURATION: 97 % | SYSTOLIC BLOOD PRESSURE: 134 MMHG | RESPIRATION RATE: 18 BRPM | BODY MASS INDEX: 26.59 KG/M2 | DIASTOLIC BLOOD PRESSURE: 78 MMHG | TEMPERATURE: 97.7 F | HEART RATE: 65 BPM

## 2021-03-26 DIAGNOSIS — R32 URINARY INCONTINENCE IN FEMALE: ICD-10-CM

## 2021-03-26 DIAGNOSIS — K57.30 DIVERTICULOSIS OF LARGE INTESTINE WITHOUT HEMORRHAGE: ICD-10-CM

## 2021-03-26 DIAGNOSIS — Z12.31 ENCOUNTER FOR SCREENING MAMMOGRAM FOR MALIGNANT NEOPLASM OF BREAST: ICD-10-CM

## 2021-03-26 DIAGNOSIS — Z00.00 MEDICARE ANNUAL WELLNESS VISIT, SUBSEQUENT: Primary | ICD-10-CM

## 2021-03-26 DIAGNOSIS — M50.00 CERVICAL DISC DISORDER WITH MYELOPATHY: ICD-10-CM

## 2021-03-26 DIAGNOSIS — J30.89 CHRONIC NONSEASONAL ALLERGIC RHINITIS DUE TO POLLEN: ICD-10-CM

## 2021-03-26 DIAGNOSIS — I10 HYPERTENSION, BENIGN: ICD-10-CM

## 2021-03-26 DIAGNOSIS — R92.2 DENSE BREAST: ICD-10-CM

## 2021-03-26 DIAGNOSIS — N18.31 STAGE 3A CHRONIC KIDNEY DISEASE (HCC): ICD-10-CM

## 2021-03-26 DIAGNOSIS — M85.89 OSTEOPENIA OF MULTIPLE SITES: ICD-10-CM

## 2021-03-26 DIAGNOSIS — M51.06 LUMBAR DISC DISORDER WITH MYELOPATHY: ICD-10-CM

## 2021-03-26 DIAGNOSIS — Z12.11 COLON CANCER SCREENING: ICD-10-CM

## 2021-03-26 DIAGNOSIS — K21.9 GASTROESOPHAGEAL REFLUX DISEASE WITH STRICTURE: ICD-10-CM

## 2021-03-26 DIAGNOSIS — K22.2 GASTROESOPHAGEAL REFLUX DISEASE WITH STRICTURE: ICD-10-CM

## 2021-03-26 DIAGNOSIS — E66.3 OVERWEIGHT: ICD-10-CM

## 2021-03-26 DIAGNOSIS — Z12.4 CERVICAL CANCER SCREENING: ICD-10-CM

## 2021-03-26 DIAGNOSIS — E78.2 MIXED HYPERLIPIDEMIA: ICD-10-CM

## 2021-03-26 PROBLEM — Z86.16 HISTORY OF 2019 NOVEL CORONAVIRUS DISEASE (COVID-19): Status: ACTIVE | Noted: 2021-03-26

## 2021-03-26 LAB
BILIRUB BLD-MCNC: NEGATIVE MG/DL
CLARITY, POC: CLEAR
COLOR UR: YELLOW
GLUCOSE UR STRIP-MCNC: NEGATIVE MG/DL
KETONES UR QL: NEGATIVE
LEUKOCYTE EST, POC: NEGATIVE
NITRITE UR-MCNC: NEGATIVE MG/ML
PH UR: 5 [PH] (ref 5–8)
PROT UR STRIP-MCNC: NEGATIVE MG/DL
RBC # UR STRIP: NEGATIVE /UL
SP GR UR: 1.01 (ref 1–1.03)
UROBILINOGEN UR QL: NORMAL

## 2021-03-26 PROCEDURE — 99214 OFFICE O/P EST MOD 30 MIN: CPT | Performed by: FAMILY MEDICINE

## 2021-03-26 PROCEDURE — 36415 COLL VENOUS BLD VENIPUNCTURE: CPT | Performed by: FAMILY MEDICINE

## 2021-03-26 PROCEDURE — 81002 URINALYSIS NONAUTO W/O SCOPE: CPT | Performed by: FAMILY MEDICINE

## 2021-03-26 PROCEDURE — G0439 PPPS, SUBSEQ VISIT: HCPCS | Performed by: FAMILY MEDICINE

## 2021-03-26 RX ORDER — ROSUVASTATIN CALCIUM 5 MG/1
5 TABLET, COATED ORAL DAILY
COMMUNITY
End: 2021-05-04

## 2021-03-27 LAB
ALBUMIN SERPL-MCNC: 4.5 G/DL (ref 3.7–4.7)
ALBUMIN/GLOB SERPL: 1.7 {RATIO} (ref 1.2–2.2)
ALP SERPL-CCNC: 59 IU/L (ref 39–117)
ALT SERPL-CCNC: 20 IU/L (ref 0–32)
AST SERPL-CCNC: 22 IU/L (ref 0–40)
BASOPHILS # BLD AUTO: 0 X10E3/UL (ref 0–0.2)
BASOPHILS NFR BLD AUTO: 1 %
BILIRUB SERPL-MCNC: 0.4 MG/DL (ref 0–1.2)
BUN SERPL-MCNC: 20 MG/DL (ref 8–27)
BUN/CREAT SERPL: 18 (ref 12–28)
CALCIUM SERPL-MCNC: 9.5 MG/DL (ref 8.7–10.3)
CHLORIDE SERPL-SCNC: 101 MMOL/L (ref 96–106)
CHOLEST SERPL-MCNC: 206 MG/DL (ref 100–199)
CHOLEST/HDLC SERPL: 3.2 RATIO (ref 0–4.4)
CO2 SERPL-SCNC: 25 MMOL/L (ref 20–29)
CREAT SERPL-MCNC: 1.13 MG/DL (ref 0.57–1)
EOSINOPHIL # BLD AUTO: 0.1 X10E3/UL (ref 0–0.4)
EOSINOPHIL NFR BLD AUTO: 2 %
ERYTHROCYTE [DISTWIDTH] IN BLOOD BY AUTOMATED COUNT: 13.1 % (ref 11.7–15.4)
GLOBULIN SER CALC-MCNC: 2.7 G/DL (ref 1.5–4.5)
GLUCOSE SERPL-MCNC: 104 MG/DL (ref 65–99)
HCT VFR BLD AUTO: 37.9 % (ref 34–46.6)
HDLC SERPL-MCNC: 65 MG/DL
HGB BLD-MCNC: 13.5 G/DL (ref 11.1–15.9)
IMM GRANULOCYTES # BLD AUTO: 0 X10E3/UL (ref 0–0.1)
IMM GRANULOCYTES NFR BLD AUTO: 0 %
LDLC SERPL CALC-MCNC: 112 MG/DL (ref 0–99)
LYMPHOCYTES # BLD AUTO: 1.4 X10E3/UL (ref 0.7–3.1)
LYMPHOCYTES NFR BLD AUTO: 30 %
MCH RBC QN AUTO: 33.7 PG (ref 26.6–33)
MCHC RBC AUTO-ENTMCNC: 35.6 G/DL (ref 31.5–35.7)
MCV RBC AUTO: 95 FL (ref 79–97)
MONOCYTES # BLD AUTO: 0.5 X10E3/UL (ref 0.1–0.9)
MONOCYTES NFR BLD AUTO: 10 %
NEUTROPHILS # BLD AUTO: 2.6 X10E3/UL (ref 1.4–7)
NEUTROPHILS NFR BLD AUTO: 57 %
PLATELET # BLD AUTO: 234 X10E3/UL (ref 150–450)
POTASSIUM SERPL-SCNC: 4.1 MMOL/L (ref 3.5–5.2)
PROT SERPL-MCNC: 7.2 G/DL (ref 6–8.5)
RBC # BLD AUTO: 4.01 X10E6/UL (ref 3.77–5.28)
SODIUM SERPL-SCNC: 140 MMOL/L (ref 134–144)
TRIGL SERPL-MCNC: 169 MG/DL (ref 0–149)
TSH SERPL DL<=0.005 MIU/L-ACNC: 2.26 UIU/ML (ref 0.45–4.5)
VLDLC SERPL CALC-MCNC: 29 MG/DL (ref 5–40)
WBC # BLD AUTO: 4.5 X10E3/UL (ref 3.4–10.8)

## 2021-04-01 DIAGNOSIS — N18.31 CHRONIC RENAL FAILURE, STAGE 3A (HCC): Primary | ICD-10-CM

## 2021-04-07 ENCOUNTER — HOSPITAL ENCOUNTER (OUTPATIENT)
Dept: ULTRASOUND IMAGING | Facility: HOSPITAL | Age: 76
Discharge: HOME | End: 2021-04-07

## 2021-04-07 ENCOUNTER — HOSPITAL ENCOUNTER (OUTPATIENT)
Dept: MAMMOGRAPHY | Facility: HOSPITAL | Age: 76
Discharge: HOME | End: 2021-04-07

## 2021-04-07 DIAGNOSIS — N18.31 CHRONIC RENAL FAILURE, STAGE 3A (HCC): ICD-10-CM

## 2021-04-07 DIAGNOSIS — Z12.31 ENCOUNTER FOR SCREENING MAMMOGRAM FOR MALIGNANT NEOPLASM OF BREAST: ICD-10-CM

## 2021-04-07 PROCEDURE — 77067 SCR MAMMO BI INCL CAD: CPT

## 2021-04-07 PROCEDURE — 76770 US EXAM ABDO BACK WALL COMP: CPT

## 2021-04-07 PROCEDURE — 77063 BREAST TOMOSYNTHESIS BI: CPT

## 2021-04-08 LAB
ALBUMIN/CREAT UR: 29 MG/G CREAT (ref 0–29)
CREAT UR-MCNC: 82 MG/DL
MICROALBUMIN UR-MCNC: 23.4 UG/ML
PROT UR-MCNC: 9.6 MG/DL
PROT/CREAT UR: 117 MG/G CREAT (ref 0–200)

## 2021-04-15 PROBLEM — N18.31 STAGE 3A CHRONIC KIDNEY DISEASE: Status: ACTIVE | Noted: 2021-04-15

## 2021-04-16 NOTE — ASSESSMENT & PLAN NOTE
Lipid abnormalities are improving with treatment.  Pharmacotherapy as ordered.  Lipids will be reassessed in 1 year.   negative...

## 2021-05-03 DIAGNOSIS — I10 HYPERTENSION, BENIGN: ICD-10-CM

## 2021-05-04 RX ORDER — ROSUVASTATIN CALCIUM 5 MG/1
TABLET, COATED ORAL
Qty: 90 TABLET | Refills: 4 | Status: SHIPPED | OUTPATIENT
Start: 2021-05-04 | End: 2022-03-28 | Stop reason: SDUPTHER

## 2021-05-04 RX ORDER — OMEPRAZOLE 20 MG/1
CAPSULE, DELAYED RELEASE ORAL
Qty: 90 CAPSULE | Refills: 4 | Status: SHIPPED | OUTPATIENT
Start: 2021-05-04 | End: 2022-03-28 | Stop reason: SDUPTHER

## 2021-05-04 RX ORDER — LISINOPRIL 20 MG/1
TABLET ORAL
Qty: 90 TABLET | Refills: 4 | Status: SHIPPED | OUTPATIENT
Start: 2021-05-04 | End: 2022-03-28 | Stop reason: SDUPTHER

## 2021-05-12 DIAGNOSIS — J30.1 ACUTE SEASONAL ALLERGIC RHINITIS DUE TO POLLEN: ICD-10-CM

## 2021-05-12 RX ORDER — MONTELUKAST SODIUM 10 MG/1
TABLET ORAL
Qty: 90 TABLET | Refills: 3 | Status: SHIPPED | OUTPATIENT
Start: 2021-05-12 | End: 2022-03-28 | Stop reason: SDUPTHER

## 2021-07-05 DIAGNOSIS — I10 HYPERTENSION, BENIGN: Primary | ICD-10-CM

## 2021-07-06 RX ORDER — HYDROCHLOROTHIAZIDE 25 MG/1
TABLET ORAL
Qty: 30 TABLET | Refills: 12 | Status: SHIPPED | OUTPATIENT
Start: 2021-07-06 | End: 2022-03-28 | Stop reason: SDUPTHER

## 2021-08-11 ENCOUNTER — TELEPHONE (OUTPATIENT)
Dept: FAMILY MEDICINE CLINIC | Facility: CLINIC | Age: 76
End: 2021-08-11

## 2021-08-11 NOTE — TELEPHONE ENCOUNTER
Called  we forwarded  records to Dr Baker's office before they arrange the appointment , but we will drop of info to the office   at the office also.

## 2021-08-11 NOTE — TELEPHONE ENCOUNTER
Caller: Akosua Munguia    Relationship: Self    Best call back number: [phone]  What is the best time to reach you: PLEASE CALL ON CELL PHONE     Who are you requesting to speak with (clinical staff, provider,  specific staff member): CLINICAL    Do you know the name of the person who called: AKOSUA    What was the call regarding:WANTS TO KNOW IF HER TEST RESULTS HAVE BEEN SENT TO THE SPECIALIST THAT SHE IS SEEING THIS Friday. SHE WANTS A CALL BACK PLEASE.     Do you require a callback: YES

## 2021-08-24 ENCOUNTER — HOSPITAL ENCOUNTER (OUTPATIENT)
Dept: GENERAL RADIOLOGY | Facility: HOSPITAL | Age: 76
Discharge: HOME | End: 2021-08-24

## 2021-08-24 ENCOUNTER — OFFICE VISIT (OUTPATIENT)
Dept: FAMILY MEDICINE CLINIC | Facility: CLINIC | Age: 76
End: 2021-08-24

## 2021-08-24 VITALS
SYSTOLIC BLOOD PRESSURE: 110 MMHG | DIASTOLIC BLOOD PRESSURE: 70 MMHG | TEMPERATURE: 97.8 F | BODY MASS INDEX: 26.42 KG/M2 | RESPIRATION RATE: 18 BRPM | HEART RATE: 56 BPM | OXYGEN SATURATION: 96 % | HEIGHT: 65 IN | WEIGHT: 158.6 LBS

## 2021-08-24 DIAGNOSIS — M51.06 LUMBAR DISC DISORDER WITH MYELOPATHY: ICD-10-CM

## 2021-08-24 DIAGNOSIS — I10 HYPERTENSION, BENIGN: ICD-10-CM

## 2021-08-24 DIAGNOSIS — M50.00 CERVICAL DISC DISORDER WITH MYELOPATHY: ICD-10-CM

## 2021-08-24 DIAGNOSIS — M50.00 CERVICAL DISC DISORDER WITH MYELOPATHY: Primary | ICD-10-CM

## 2021-08-24 DIAGNOSIS — E66.3 OVERWEIGHT: ICD-10-CM

## 2021-08-24 PROCEDURE — 72050 X-RAY EXAM NECK SPINE 4/5VWS: CPT

## 2021-08-24 PROCEDURE — 72110 X-RAY EXAM L-2 SPINE 4/>VWS: CPT

## 2021-08-24 PROCEDURE — 99213 OFFICE O/P EST LOW 20 MIN: CPT | Performed by: FAMILY MEDICINE

## 2021-08-24 NOTE — PROGRESS NOTES
Chief Complaint  Arm Pain    Subjective     CC  Problem List  Visit Diagnosis   Encounters  Notes  Medications  Labs  Result Review Imaging  Media    Akosua Munguia presents to [facility] for   Arm Pain   The incident occurred more than 1 week ago (2 months). There was no injury mechanism. The pain is present in the left shoulder and right shoulder. The quality of the pain is described as burning. The pain radiates to the left neck and left arm (bilateral shoulder pain). The pain is at a severity of 5/10. The pain is moderate. The pain has been worsening since the incident. Pertinent negatives include no chest pain, numbness or tingling. Exacerbated by: when lying down. She has tried acetaminophen for the symptoms. The treatment provided no relief.   Hypertension  This is a chronic problem. The current episode started more than 1 year ago. The problem is controlled. Associated symptoms include neck pain. Pertinent negatives include no chest pain, headaches, malaise/fatigue, orthopnea, palpitations, shortness of breath or sweats. Risk factors for coronary artery disease include dyslipidemia, post-menopausal state and family history. Current antihypertension treatment includes beta blockers and ACE inhibitors. The current treatment provides moderate improvement.   Neck Pain   This is a chronic problem. The current episode started more than 1 year ago. The problem occurs intermittently. The problem has been gradually worsening. The pain is associated with lifting a heavy object. The quality of the pain is described as burning. The pain is at a severity of 5/10. The pain is mild. Pertinent negatives include no chest pain, fever, headaches, numbness, tingling, weakness or weight loss.   Back Pain  This is a chronic problem. The current episode started more than 1 year ago. The problem occurs intermittently. The problem has been gradually worsening since onset. The pain is  "present in the lumbar spine. The quality of the pain is described as burning. The pain does not radiate. The pain is at a severity of 3/10. The pain is mild. Pertinent negatives include no abdominal pain, bladder incontinence, bowel incontinence, chest pain, fever, headaches, numbness, tingling, weakness or weight loss.       Review of Systems   Constitutional: Negative for fever, malaise/fatigue, unexpected weight gain and unexpected weight loss.   Respiratory: Negative for shortness of breath.    Cardiovascular: Negative for chest pain, palpitations and orthopnea.   Gastrointestinal: Negative for abdominal pain, bowel incontinence, diarrhea, nausea and vomiting.   Endocrine: Negative for cold intolerance, heat intolerance, polydipsia and polyuria.   Genitourinary: Negative for urinary incontinence.   Musculoskeletal: Positive for back pain and neck pain.   Neurological: Negative for tingling, weakness and numbness.   Hematological: Negative for adenopathy. Does not bruise/bleed easily.        Objective   Vital Signs:   /70 (BP Location: Right arm, Patient Position: Sitting, Cuff Size: Adult)   Pulse 56   Temp 97.8 °F (36.6 °C) (Temporal)   Resp 18   Ht 165.1 cm (65\")   Wt 71.9 kg (158 lb 9.6 oz)   SpO2 96% Comment: room air  BMI 26.39 kg/m²     Physical Exam  Constitutional:       General: She is not in acute distress.  Cardiovascular:      Rate and Rhythm: Normal rate and regular rhythm.      Heart sounds: No murmur heard.     Pulmonary:      Effort: Pulmonary effort is normal.      Breath sounds: Normal breath sounds. No wheezing.   Musculoskeletal:      Cervical back: Neck supple. No swelling, deformity, erythema, tenderness or bony tenderness. Decreased range of motion.      Lumbar back: No deformity, tenderness or bony tenderness. Decreased range of motion. Negative right straight leg raise test and negative left straight leg raise test.   Lymphadenopathy:      Cervical: No cervical adenopathy. "   Neurological:      Mental Status: She is alert.        Result Review :Labs  Result Review  Imaging  Med Tab  Media                 Assessment and Plan CC Problem List  Visit Diagnosis  ROS  Review (Popup)  Health Maintenance  Quality  BestPractice  Medications  SmartSets  SnapShot Encounters  Media  Problem List Items Addressed This Visit        High    Hypertension, benign    Overview     Controlled. She is compliant wt meds            Low    Lumbar disc disorder with myelopathy    Overview     No sxs at present. She practices good back mechanics.          Relevant Orders    XR Spine Lumbar Complete 4+VW (Completed)    Ambulatory Referral to Physical Therapy Evaluate and treat    Overweight    Overview     Diet and exercise discussed.             Unprioritized    Cervical disc disorder with myelopathy - Primary    Overview     Exacerbation of sxs. Resume PT. Ice heat ROM and Tylenol in the interim.  CKD will no allow anti inflammatories. She declines steroids.          Relevant Orders    XR Spine Cervical Complete 4 or 5 View (Completed)    Ambulatory Referral to Physical Therapy Evaluate and treat          Follow Up Instructions Charge Capture  Follow-up Communications  Return if symptoms worsen or fail to improve.  Patient was given instructions and counseling regarding her condition or for health maintenance advice. Please see specific information pulled into the AVS if appropriate.

## 2021-08-30 ENCOUNTER — OFFICE VISIT (OUTPATIENT)
Dept: FAMILY MEDICINE CLINIC | Facility: CLINIC | Age: 76
End: 2021-08-30

## 2021-08-30 VITALS
WEIGHT: 165.6 LBS | HEIGHT: 65 IN | SYSTOLIC BLOOD PRESSURE: 124 MMHG | RESPIRATION RATE: 18 BRPM | DIASTOLIC BLOOD PRESSURE: 80 MMHG | TEMPERATURE: 97.7 F | HEART RATE: 60 BPM | OXYGEN SATURATION: 99 % | BODY MASS INDEX: 27.59 KG/M2

## 2021-08-30 DIAGNOSIS — S50.12XA TRAUMATIC HEMATOMA OF LEFT FOREARM, INITIAL ENCOUNTER: ICD-10-CM

## 2021-08-30 DIAGNOSIS — I10 HYPERTENSION, BENIGN: ICD-10-CM

## 2021-08-30 DIAGNOSIS — S49.92XA ARM INJURY, LEFT, INITIAL ENCOUNTER: Primary | ICD-10-CM

## 2021-08-30 DIAGNOSIS — N18.31 STAGE 3A CHRONIC KIDNEY DISEASE (HCC): ICD-10-CM

## 2021-08-30 DIAGNOSIS — E66.3 OVERWEIGHT: ICD-10-CM

## 2021-08-30 PROCEDURE — 99213 OFFICE O/P EST LOW 20 MIN: CPT | Performed by: FAMILY MEDICINE

## 2021-08-30 NOTE — PROGRESS NOTES
Chief Complaint  Arm Pain and Hypertension    Subjective     CC  Problem List  Visit Diagnosis   Encounters  Notes  Medications  Labs  Result Review Imaging  Media    Akosua Munguia presents to [facility] for        Arm Pain   The incident occurred 5 to 7 days ago (one week). The incident occurred at home. The injury mechanism was a direct blow (Akosua reports while giving a cow a shot she got her left arm shoved into a gate. ). The pain is present in the left forearm and left wrist. The pain does not radiate. The pain is at a severity of 0/10. The patient is experiencing no pain. The pain has been improving since the incident. Pertinent negatives include no chest pain, numbness or tingling. Associated symptoms comments: Arm swelling with lower arm mass on left arm.. The symptoms are aggravated by palpation. She has tried ice and heat for the symptoms. The treatment provided mild relief.   Hypertension  This is a chronic problem. The current episode started more than 1 year ago. The problem is controlled. Pertinent negatives include no chest pain, headaches, malaise/fatigue, orthopnea, palpitations, PND, shortness of breath or sweats. Risk factors for coronary artery disease include dyslipidemia, post-menopausal state and family history. Current antihypertension treatment includes ACE inhibitors and beta blockers. The current treatment provides moderate improvement.       Review of Systems   Constitutional: Negative for fever and malaise/fatigue.   Respiratory: Negative for shortness of breath.    Cardiovascular: Negative for chest pain, palpitations, orthopnea and PND.   Endocrine: Negative for cold intolerance, heat intolerance, polydipsia and polyuria.   Musculoskeletal: Positive for myalgias (left forearm with swelling and brusing. ).   Neurological: Negative for tingling, weakness and numbness.   Hematological: Negative for adenopathy. Does not bruise/bleed easily.     "    Objective   Vital Signs:   /80 (BP Location: Right arm, Patient Position: Sitting, Cuff Size: Adult)   Pulse 60   Temp 97.7 °F (36.5 °C) (Temporal)   Resp 18   Ht 165.1 cm (65\")   Wt 75.1 kg (165 lb 9.6 oz)   SpO2 99% Comment: room air  BMI 27.56 kg/m²     Physical Exam  Constitutional:       General: She is not in acute distress.  Cardiovascular:      Rate and Rhythm: Normal rate and regular rhythm.      Pulses:           Radial pulses are 2+ on the right side and 2+ on the left side.      Heart sounds: No murmur heard.     Musculoskeletal:         General: Swelling (There is a 5 cm round hematoma over the mid left forearm extensor surface , ) present.      Left forearm: Swelling (There is a 5 cm hematoma over the extensor surface. ) present. No deformity, tenderness or bony tenderness.      Cervical back: Neck supple.   Lymphadenopathy:      Cervical: No cervical adenopathy.   Neurological:      Mental Status: She is alert.        Result Review :Labs  Result Review  Imaging  Med Tab  Media                 Assessment and Plan CC Problem List  Visit Diagnosis  ROS  Review (Popup)  Health Maintenance  Quality  BestPractice  Medications  SmartSets  SnapShot Encounters  Media  Problem List Items Addressed This Visit        High    Hypertension, benign    Overview     Controlled. She is compliant [facility] meds.          Current Assessment & Plan     Hypertension is improving with treatment.  Continue current treatment regimen.  Regular aerobic exercise.  Blood pressure will be reassessed at the next regular appointment.         Stage 3a chronic kidney disease (CMS/HCC)    Overview     Creat 1.13 GFR 48 03/26/2021 slightly worse, followed with nephrology Marie  Creat 1.04 GFR 52  02/03/2020,  She avoid anti inflammatories, may need to stop PPI            Low    Overweight    Overview     Diet and exercise discussed.            Other Visit Diagnoses     Arm injury, left, initial encounter    -  " Primary    Traumatic hematoma of left forearm, initial encounter        Elevation ice heat compression and follow up if resolution does not occur over 1 mo.           Follow Up Instructions Charge Capture  Follow-up Communications  Return if symptoms worsen or fail to improve.  Patient was given instructions and counseling regarding her condition or for health maintenance advice. Please see specific information pulled into the AVS if appropriate.

## 2021-09-16 ENCOUNTER — TREATMENT (OUTPATIENT)
Dept: PHYSICAL THERAPY | Facility: CLINIC | Age: 76
End: 2021-09-16

## 2021-09-16 DIAGNOSIS — M54.12 RADICULOPATHY, CERVICAL: ICD-10-CM

## 2021-09-16 DIAGNOSIS — M54.16 RADICULOPATHY, LUMBAR REGION: ICD-10-CM

## 2021-09-16 DIAGNOSIS — M54.2 PAIN, NECK: Primary | ICD-10-CM

## 2021-09-16 PROCEDURE — G0283 ELEC STIM OTHER THAN WOUND: HCPCS | Performed by: PHYSICAL THERAPIST

## 2021-09-16 PROCEDURE — 97162 PT EVAL MOD COMPLEX 30 MIN: CPT | Performed by: PHYSICAL THERAPIST

## 2021-09-16 PROCEDURE — 97140 MANUAL THERAPY 1/> REGIONS: CPT | Performed by: PHYSICAL THERAPIST

## 2021-09-16 NOTE — PROGRESS NOTES
Physical Therapy Initial Evaluation and Plan of Care    Patient: Akosua Munguia   : 1945  Diagnosis/ICD-10 Code:  Pain, neck [M54.2]  Referring practitioner: Jenny Keenan MD  Date of Initial Visit: 2021  Today's Date: 2021  Patient seen for 1 sessions           Subjective Questionnaire: NDI: 13/50, indicating 26% impairment  Oswestry: 18/50, indicating 36% impairment      Subjective Evaluation    History of Present Illness  Mechanism of injury: Pt reports having neck and shoulder pain at night. Unable to lay on her sides and has to sleep on her back. Wakes at night due to neck pain. Neck pain started ~3 months ago. Denies any incident or injury at time of onset. States pain is a burning sensation out to shoulders. Difficulty turning her head. Neck pain increases with activity. Cannot Saw specialist in [place] ~5 yr ago due to neck pain and UE pain. All pain resolved with that episode. Takes extra strength Tylenol every 6 hr for pain.  States she has pain down L LE from hip to toes that radiates down front of leg. Difficulty sitting >30 min. States pain seems to start in L buttock and wraps around side of hip to front of thigh. Has L LE pain while mowing also. Has cramps in L LE at night at times. L LE pain started 1-2 months and not worsened or improved. L pain is only present when she rides in the car. Denies any weakness in L LE.   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       Patient Occupation: retired teacher Pain  Current pain ratin  At best pain ratin  At worst pain ratin  Location: neck, radiating out to B shoulders  Quality: burning, dull ache, radiating and discomfort  Relieving factors: change in position and medications  Aggravating factors: lifting, movement and sleeping    Social Support  Lives in: multiple-level home  Lives with: spouse    Hand dominance: right    Diagnostic Tests  X-ray:  abnormal    Treatments  Previous treatment: physical therapy  Patient Goals  Patient goals for therapy: decreased pain, improved balance, increased motion, increased strength and independence with ADLs/IADLs             Objective          Postural Observations  Seated posture: fair  Standing posture: fair    Additional Postural Observation Details  Mild rounded shoulders, decreased lumbar lordosis.    Palpation   Left   Hypertonic in the upper trapezius.   Tenderness of the cervical paraspinals, suboccipitals and upper trapezius.     Right   Hypertonic in the upper trapezius. Tenderness of the cervical paraspinals, suboccipitals and upper trapezius.     Tenderness     Additional Tenderness Details  No tenderness to palpation at low back or hip (including anterior hip).    Active Range of Motion     Additional Active Range of Motion Details  Cervical AROM:  Flex = 75%  Ext = 75%  Rotation B = 25% with pain  Sidebending B = 10-15% with pain  Lumbar AROM:  Performs 75% in all directions with no increase in symptoms.  Mild tightness B hip ER AROM and PROM.    Strength/Myotome Testing     Left Shoulder     Planes of Motion   Flexion: 5   Abduction: 5   External rotation at 0°: 5   Internal rotation at 0°: 5     Right Shoulder     Planes of Motion   Flexion: 5   Abduction: 5   External rotation at 0°: 5   Internal rotation at 0°: 5     Left Elbow   Flexion: 5  Extension: 5    Right Elbow   Flexion: 5  Extension: 5    Left Knee   Flexion: 4+  Extension: 5    Right Knee   Flexion: 5  Extension: 5    Tests   Cervical     Left   Positive cervical distraction.     Right   Positive cervical distraction.     Cervical Flexibility Comments:   Significant tightness B UT.          Assessment & Plan     Assessment  Impairments: abnormal muscle firing, abnormal muscle tone, abnormal or restricted ROM, activity intolerance, impaired physical strength, lacks appropriate home exercise program and pain with function  Assessment details:  Pt is 76 yo female with c/o neck pain that radiates out to B shoulders and c/o radiating pain that starts in L buttocks and wraps around hip and runs down front of L LE to foot. Pt is having difficulty turning her head while driving. Difficulty with L LE pain while driving and mowing. Difficulty sitting >30 min. Difficulty sleeping. 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 NDI indicates 26% impairment and Oswestry indicates 36% impairment.    Patient presents with the impairments listed above and based on the objective findings and the physical therapy evaluation, the patient’s condition has the potential to improve in response to therapy.   The patient’s condition and/or services required are at a level of complexity that necessitates the skill & supervision of a physical therapist.    Prognosis: good  Functional Limitations: carrying objects, lifting, sleeping, pulling, pushing, uncomfortable because of pain, moving in bed, sitting, reaching behind back, reaching overhead and unable to perform repetitive tasks  Goals  Plan Goals: STG:  - Pt to report 25% improvement in pain with attempting to turn her head in 3 weeks.  - Pt to report 25% improvement in L LE pain with sitting in 3 weeks.  - Pt to be independent with HEP in 3 weeks.  LTG:  - Improve NDI to 15% or less impairment by discharge.  - Oswestry to indicate 20% or less impairment by discharge.  - Pt to demonstrate 50% rotation ROM with no increase in pain by discharge.  - Pt to report no L LE radicular pain with sitting and driving >30 min by discharge.  - Pt to rate max pain at 2-3/10 with activity by discharge.    Plan  Therapy options: will be seen for skilled physical therapy services  Planned modality interventions: thermotherapy (hydrocollator packs), electrical stimulation/Russian stimulation, traction and dry needling  Other planned modality interventions: modalities as indicated  Planned therapy  interventions: body mechanics training, flexibility, home exercise program, functional ROM exercises, manual therapy, postural training, soft tissue mobilization, strengthening, stretching, therapeutic activities, neuromuscular re-education, transfer training and gait training  Frequency: 2x week  Duration in visits: 20  Treatment plan discussed with: patient        Timed:         Manual Therapy:    15     mins  78445;     Therapeutic Exercise:    5     mins  86801;     Neuromuscular Leo:        mins  14927;    Therapeutic Activity:          mins  63801;     Gait Training:           mins  59798;     Ultrasound:          mins  76391;    Ionto                                   mins   95349  Can Repos          mins 89754    Un-Timed:  Electrical Stimulation:    15     mins  18724 ( );  Dry Needling          mins self-pay  Traction          mins 75560  Low Eval          Mins  12136  Mod Eval     30     Mins  59199  High Eval                            Mins  12644  Self - Care                          mins  26435        Timed Treatment:   20   mins   Total Treatment:     65   mins    PT SIGNATURE: Mylene Booth, MARK   DATE TREATMENT INITIATED: 9/17/2021    Medicare Initial Certification  Certification Period: 12/16/2021  I certify that the therapy services are furnished while this patient is under my care.  The services outlined above are required by this patient, and will be reviewed every 90 days.     PHYSICIAN: Jenny Keenan MD ___________________________________________________________     DATE:  ________________________________________________    Please sign and return via fax to [phone].. Thank you, [facility].

## 2021-09-20 ENCOUNTER — TREATMENT (OUTPATIENT)
Dept: PHYSICAL THERAPY | Facility: CLINIC | Age: 76
End: 2021-09-20

## 2021-09-20 DIAGNOSIS — M54.2 PAIN, NECK: Primary | ICD-10-CM

## 2021-09-20 PROCEDURE — 97140 MANUAL THERAPY 1/> REGIONS: CPT | Performed by: PHYSICAL THERAPIST

## 2021-09-20 PROCEDURE — 97012 MECHANICAL TRACTION THERAPY: CPT | Performed by: PHYSICAL THERAPIST

## 2021-09-20 PROCEDURE — 97110 THERAPEUTIC EXERCISES: CPT | Performed by: PHYSICAL THERAPIST

## 2021-09-22 ENCOUNTER — TREATMENT (OUTPATIENT)
Dept: PHYSICAL THERAPY | Facility: CLINIC | Age: 76
End: 2021-09-22

## 2021-09-22 DIAGNOSIS — M54.2 PAIN, NECK: Primary | ICD-10-CM

## 2021-09-22 PROCEDURE — G0283 ELEC STIM OTHER THAN WOUND: HCPCS | Performed by: PHYSICAL THERAPIST

## 2021-09-22 PROCEDURE — 97110 THERAPEUTIC EXERCISES: CPT | Performed by: PHYSICAL THERAPIST

## 2021-09-22 NOTE — PROGRESS NOTES
Physical Therapy Daily Progress Note      Patient: Aksoua Munguia   : 1945  Diagnosis/ICD-10 Code:  Pain, neck [M54.2]  Referring practitioner: Jenny Keenan MD  Date of Initial Visit: Type: THERAPY  Noted: 2021  Today's Date: 2021  Patient seen for 3 sessions. POC 20, 2x/wk.              Subjective :  Sharp pain 3/10 between shoulder blades. She is just sore at neck.  She has been doing HEP and notes that rows with blue theraband hurt her to do.  She found her TENS unit and would like to be instructed in where to put electrodes.  She used traction unit @ 14# x 10'.    Objective   See Exercise, Manual, and Modality Logs for complete treatment. Progression as noted.       Education:  Patient given education info on use of TENS unit and placing electrodes.   Patient Education  TENS Therapy      Assessment/Plan:  Much difficulty with chin tucks, verbal and tactile cueing required.   She is doing rows correctly but changed theraband to less resistance due to pain.  Patient appeared to respond well to interventions and decreased pain afterward.     Progress per Plan of Care:  Monitor response, continue as appropriate.         Timed:                                                                           Manual Therapy:                 mins  07216;                  Therapeutic Exercise:    30     mins  20270;     Neuromuscular Leo:        mins  61638;    Therapeutic Activity:           mins  91243;     Gait Training:                      mins  87601;     Ultrasound:                          mins  66348;    Ionto                                   mins   94914  Can Repos                          mins 61734     Un-Timed:  Electrical Stimulation:    15     mins  38531 ( );  Dry Needling                       mins self-pay  Traction                               mins 99113  Low Eval                             Mins  26122  Mod Eval                             Mins  25320  High Eval                             Mins  80053  Self - Care                          mins  52572           Timed Treatment:   30   mins   Total Treatment:     45   mins      Nabila Ceja PTA  Physical Therapist Assistant

## 2021-09-27 ENCOUNTER — TREATMENT (OUTPATIENT)
Dept: PHYSICAL THERAPY | Facility: CLINIC | Age: 76
End: 2021-09-27

## 2021-09-27 DIAGNOSIS — M54.2 PAIN, NECK: Primary | ICD-10-CM

## 2021-09-27 PROCEDURE — 97140 MANUAL THERAPY 1/> REGIONS: CPT | Performed by: PHYSICAL THERAPIST

## 2021-09-27 PROCEDURE — 97110 THERAPEUTIC EXERCISES: CPT | Performed by: PHYSICAL THERAPIST

## 2021-09-27 NOTE — PROGRESS NOTES
"   Physical Therapy Daily Progress Note      Patient: Akosua Munguia   : 1945  Diagnosis/ICD-10 Code:  Pain, neck [M54.2]  Referring practitioner: Jenny Keenan MD  Date of Initial Visit: Type: THERAPY  Noted: 2021  Today's Date: 2021  Patient seen for 4 sessions. POC 20, 2x/wk.              Subjective :  Pain 2/10 between shoulder blades.  She is using traction and thinks it is helping.  She is also using TENS at home.  Doing HEP regularly. She is getting ready to drive to [place]  and is nervous about the drive as driving really seems to contribute to pain.   She continues with LE stretches and appear to be helping also.     Objective   See Exercise, Manual, and Modality Logs for complete treatment. Progression as noted.       Education:  Discussed driving strategies.  HEP issued, see chart. Instructed to increase weight on traction to 16# x 10 minutes.     Assessment/Plan:  Pt. Improved with chin tucks using fingers to help \"tuck\" chin.  She appears to be responding well to traction and use of TENS @ home. Goals met as noted.     Plan Goals: STG:  - Pt to report 25% improvement in pain with attempting to turn her head in 3 weeks.  - Pt to report 25% improvement in L LE pain with sitting in 3 weeks.-MET  - Pt to be independent with HEP in 3 weeks.-MET  LTG:  - Improve NDI to 15% or less impairment by discharge.  - Oswestry to indicate 20% or less impairment by discharge.  - Pt to demonstrate 50% rotation ROM with no increase in pain by discharge.  - Pt to report no L LE radicular pain with sitting and driving >30 min by discharge.  - Pt to rate max pain at 2-3/10 with activity by discharge.    Progress per Plan of Care:  Monitor response, continue as appropriate.         Timed:                                                                           Manual Therapy:           10      mins  83963;                  Therapeutic Exercise:    30     mins  96343;     Neuromuscular Leo:        " mins  66849;    Therapeutic Activity:           mins  83645;     Gait Training:                      mins  46666;     Ultrasound:                          mins  44189;    Ionto                                   mins   81383  Can Repos                          mins 93012     Un-Timed:  Electrical Stimulation:        mins  59910 ( );  Dry Needling                       mins self-pay  Traction                               mins 36674  Low Eval                             Mins  09376  Mod Eval                             Mins  46866  High Eval                            Mins  58600  Self - Care                          mins  27701           Timed Treatment:   40   mins   Total Treatment:   40    mins      Nabila Ceja PTA  Physical Therapist Assistant

## 2021-09-30 ENCOUNTER — TREATMENT (OUTPATIENT)
Dept: PHYSICAL THERAPY | Facility: CLINIC | Age: 76
End: 2021-09-30

## 2021-09-30 DIAGNOSIS — M54.12 RADICULOPATHY, CERVICAL: ICD-10-CM

## 2021-09-30 DIAGNOSIS — M54.16 RADICULOPATHY, LUMBAR REGION: ICD-10-CM

## 2021-09-30 DIAGNOSIS — M54.2 PAIN, NECK: Primary | ICD-10-CM

## 2021-09-30 PROCEDURE — 97140 MANUAL THERAPY 1/> REGIONS: CPT | Performed by: PHYSICAL THERAPIST

## 2021-09-30 PROCEDURE — 97110 THERAPEUTIC EXERCISES: CPT | Performed by: PHYSICAL THERAPIST

## 2021-10-05 ENCOUNTER — TELEPHONE (OUTPATIENT)
Dept: PHYSICAL THERAPY | Facility: CLINIC | Age: 76
End: 2021-10-05

## 2021-10-08 ENCOUNTER — TREATMENT (OUTPATIENT)
Dept: PHYSICAL THERAPY | Facility: CLINIC | Age: 76
End: 2021-10-08

## 2021-10-08 DIAGNOSIS — M54.16 RADICULOPATHY, LUMBAR REGION: ICD-10-CM

## 2021-10-08 DIAGNOSIS — M54.2 PAIN, NECK: Primary | ICD-10-CM

## 2021-10-08 DIAGNOSIS — M54.12 RADICULOPATHY, CERVICAL: ICD-10-CM

## 2021-10-08 PROCEDURE — 97140 MANUAL THERAPY 1/> REGIONS: CPT | Performed by: PHYSICAL THERAPIST

## 2021-10-08 PROCEDURE — 97110 THERAPEUTIC EXERCISES: CPT | Performed by: PHYSICAL THERAPIST

## 2021-10-08 NOTE — PROGRESS NOTES
Physical Therapy Daily Progress Note      Patient: Akosua Munguia   : 1945  Diagnosis/ICD-10 Code:  Pain, neck [M54.2]   Problems Addressed this Visit     None      Visit Diagnoses     Pain, neck    -  Primary    Radiculopathy, lumbar region        Radiculopathy, cervical          Diagnoses       Codes Comments    Pain, neck    -  Primary ICD-10-CM: M54.2  ICD-9-CM: 723.1     Radiculopathy, lumbar region     ICD-10-CM: M54.16  ICD-9-CM: 724.4     Radiculopathy, cervical     ICD-10-CM: M54.12  ICD-9-CM: 723.4         Referring practitioner: Jenny Keenan MD  Date of Initial Visit: Type: THERAPY  Noted: 2021  Today's Date: 10/8/2021    VISIT#: 6    Subjective : pt reports she only had pain down front of L LE for only an hour on trip to [place] and was pleased with this. Using cervical traction unit at 14# now.     Objective : instructed pt in UT stretch using belt over shoulder and assist cervical rotation with towel. Pt demonstrated good understanding of both with min verbal cues for minor adjustment to head position during UT stretch. Issued copy of exercises for HEP. Also added horiz shoulder abd with red Tband with cues for mid trap activation.  Recommended that pt look into getting regular massage to help with UT tension and encouraged to start with light pressure. Provided pt with name of local massage therapists and pt plans to look into this.  See Exercise, Manual, and Modality Logs for complete treatment.     Assessment/Plan : Improved tolerance to being in car with less L LE radicular symptoms. Good tolerance to ther ex with no increase in symptoms. Continued neck pain with B UT tightness, tenderness, and increased tension (R>L). Pt will benefit from mid and low trap strengthening and continued manual techniques to decrease pain.    Progress per Plan of Care         Timed:         Manual Therapy:    20     mins  89372;     Therapeutic Exercise:    10     mins  77393;     Neuromuscular Leo:         mins  48918;    Therapeutic Activity:          mins  04336;     Gait Training:           mins  68982;     Ultrasound:          mins  26697;    Ionto                                   mins   09971  Self Care                            mins   95063  Canalith Repos                   mins  85023    Un-Timed:  Electrical Stimulation:         mins  07628 ( );  Dry Needling          mins self-pay  Traction          mins 37062  Low Eval          Mins  66417  Mod Eval          Mins  22962  High Eval                            Mins  63506  Re-Eval                               mins  92961    Timed Treatment:   30   mins   Total Treatment:     30   mins    Mylene Booth, PT  Physical Therapist

## 2021-10-13 ENCOUNTER — TREATMENT (OUTPATIENT)
Dept: PHYSICAL THERAPY | Facility: CLINIC | Age: 76
End: 2021-10-13

## 2021-10-13 DIAGNOSIS — M54.12 RADICULOPATHY, CERVICAL: ICD-10-CM

## 2021-10-13 DIAGNOSIS — M54.2 PAIN, NECK: Primary | ICD-10-CM

## 2021-10-13 PROCEDURE — 97035 APP MDLTY 1+ULTRASOUND EA 15: CPT | Performed by: PHYSICAL THERAPIST

## 2021-10-13 PROCEDURE — 97110 THERAPEUTIC EXERCISES: CPT | Performed by: PHYSICAL THERAPIST

## 2021-10-13 NOTE — PROGRESS NOTES
Physical Therapy Daily Progress Note      Patient: Akosua Munguia   : 1945  Diagnosis/ICD-10 Code:  Pain, neck [M54.2]  Referring practitioner: Jenny Keenan MD  Date of Initial Visit: Type: THERAPY  Noted: 2021  Today's Date: 10/13/2021  Patient seen for 7 sessions. POC 20, 2x/wk.              Subjective :  Pain between shoulder blades, 2/10.  Notes still doing traction @ 16# x 10', not sure if it is still helping.     Objective   See Exercise, Manual, and Modality Logs for complete treatment. Progression as noted.       Education:  Pt. Instructed to hold home traction for few days to monitor response and see if still beneficial.  HEP progressed and issued.   Exercises  Standing Backward Shoulder Rolls - 2 x daily - 7 x weekly - 2 sets - 10 reps  Seated Scapular Retraction - 2 x daily - 7 x weekly - 1 sets - 10 reps - 5 sec. hold      Assessment/Plan:  Trialed ultrasound to assist with muscle tension and pain. Patient responded well with decrease in symptoms afterward.     Progress per Plan of Care:  Monitor response to ultrasound and holding cervical traction.         Timed:                                                                           Manual Therapy:                 mins  44978;                  Therapeutic Exercise:    20     mins  82170;     Neuromuscular Leo:        mins  35080;    Therapeutic Activity:           mins  71459;     Gait Training:                      mins  64701;     Ultrasound:                   10       mins  57321;    Ionto                                   mins   15473  Can Repos                          mins 77458     Un-Timed:  Electrical Stimulation:        mins  54824 ( );  Dry Needling                       mins self-pay  Traction                               mins 42907  Low Eval                             Mins  46340  Mod Eval                             Mins  76690  High Eval                            Mins  87325  Self - Care                           mins  20207           Timed Treatment:   30   mins   Total Treatment:   30    mins      Nabila Ceja PTA  Physical Therapist Assistant

## 2021-10-19 ENCOUNTER — TREATMENT (OUTPATIENT)
Dept: PHYSICAL THERAPY | Facility: CLINIC | Age: 76
End: 2021-10-19

## 2021-10-19 DIAGNOSIS — M54.2 PAIN, NECK: Primary | ICD-10-CM

## 2021-10-19 DIAGNOSIS — M54.12 RADICULOPATHY, CERVICAL: ICD-10-CM

## 2021-10-19 DIAGNOSIS — M54.16 RADICULOPATHY, LUMBAR REGION: ICD-10-CM

## 2021-10-19 PROCEDURE — 97110 THERAPEUTIC EXERCISES: CPT | Performed by: PHYSICAL THERAPIST

## 2021-10-19 PROCEDURE — 97035 APP MDLTY 1+ULTRASOUND EA 15: CPT | Performed by: PHYSICAL THERAPIST

## 2021-10-19 NOTE — PROGRESS NOTES
Physical Therapy Daily Progress Note      Patient: Akosua Munguia   : 1945  Diagnosis/ICD-10 Code:  Pain, neck [M54.2]   Problems Addressed this Visit     None      Visit Diagnoses     Pain, neck    -  Primary    Radiculopathy, cervical        Radiculopathy, lumbar region          Diagnoses       Codes Comments    Pain, neck    -  Primary ICD-10-CM: M54.2  ICD-9-CM: 723.1     Radiculopathy, cervical     ICD-10-CM: M54.12  ICD-9-CM: 723.4     Radiculopathy, lumbar region     ICD-10-CM: M54.16  ICD-9-CM: 724.4         Referring practitioner: Jenny Keenan MD  Date of Initial Visit: Type: THERAPY  Noted: 2021  Today's Date: 10/19/2021    VISIT#: 8    Subjective : Pt reports she feels like the US helped last visit and her neck and shoulders felt looser on the way home after PT.    Objective : started session with US, followed by UT stretching and ther ex.     See Exercise, Manual, and Modality Logs for complete treatment.     Assessment/Plan : Good initial response US. Pt reports decreased pain at start of session. Good tolerance to ther ex with no increase in symptoms.    Progress per Plan of Care         Timed:         Manual Therapy:    5     mins  38433;     Therapeutic Exercise:    15     mins  07474;     Neuromuscular Leo:        mins  73874;    Therapeutic Activity:          mins  72914;     Gait Training:           mins  66398;     Ultrasound:     10     mins  06755;    Ionto                                   mins   46387  Self Care                            mins   52225  Canalith Repos                   mins  75649    Un-Timed:  Electrical Stimulation:         mins  68148 ( );  Dry Needling          mins self-pay  Traction          mins 87094  Low Eval          Mins  39270  Mod Eval          Mins  12341  High Eval                            Mins  69911  Re-Eval                               mins  36562    Timed Treatment:   30   mins   Total Treatment:     30   mins    Mylene  Leobardo, PT  Physical Therapist

## 2021-10-25 ENCOUNTER — TREATMENT (OUTPATIENT)
Dept: PHYSICAL THERAPY | Facility: CLINIC | Age: 76
End: 2021-10-25

## 2021-10-25 DIAGNOSIS — M54.12 RADICULOPATHY, CERVICAL: ICD-10-CM

## 2021-10-25 DIAGNOSIS — M54.2 PAIN, NECK: Primary | ICD-10-CM

## 2021-10-25 PROCEDURE — 97035 APP MDLTY 1+ULTRASOUND EA 15: CPT | Performed by: PHYSICAL THERAPIST

## 2021-10-25 PROCEDURE — 97110 THERAPEUTIC EXERCISES: CPT | Performed by: PHYSICAL THERAPIST

## 2021-10-25 NOTE — PROGRESS NOTES
Physical Therapy Daily Progress Note      Patient: Akosua Munguia   : 1945  Diagnosis/ICD-10 Code:  Pain, neck [M54.2]  Referring practitioner: Jenny Keenan MD  Date of Initial Visit: Type: THERAPY  Noted: 2021  Today's Date: 10/25/2021  Patient seen for 9 sessions. POC 20, 2x/wk.              Subjective :  Overall improvement 50%. Sore B UT/shoulders, no pain.  Pt. Doing cervical traction every other day.     Objective   See Exercise, Manual, and Modality Logs for complete treatment. Progression as noted.       Education: Try stopping traction or doing every 2 days and monitor response to see if still beneficial.     Assessment/Plan:  Pt. Responding well to interventions and slowly working towards goals.     Progress per Plan of Care:  Monitor decreasing or holding traction.         Timed:                                                                           Manual Therapy:                 mins  21323;                  Therapeutic Exercise:    20     mins  72284;     Neuromuscular Leo:        mins  92275;    Therapeutic Activity:           mins  51370;     Gait Training:                      mins  87301;     Ultrasound:                   10       mins  45768;    Ionto                                   mins   42946  Can Repos                          mins 68347     Un-Timed:  Electrical Stimulation:        mins  07041 ( );  Dry Needling                       mins self-pay  Traction                               mins 71738  Low Eval                             Mins  85312  Mod Eval                             Mins  79596  High Eval                            Mins  66046  Self - Care                          mins  81180           Timed Treatment:   30   mins   Total Treatment:   30    mins      Nabila Ceja PTA  Physical Therapist Assistant

## 2021-11-01 ENCOUNTER — TREATMENT (OUTPATIENT)
Dept: PHYSICAL THERAPY | Facility: CLINIC | Age: 76
End: 2021-11-01

## 2021-11-01 DIAGNOSIS — M54.2 PAIN, NECK: Primary | ICD-10-CM

## 2021-11-01 PROCEDURE — 97112 NEUROMUSCULAR REEDUCATION: CPT | Performed by: PHYSICAL THERAPIST

## 2021-11-01 PROCEDURE — 97110 THERAPEUTIC EXERCISES: CPT | Performed by: PHYSICAL THERAPIST

## 2021-11-01 NOTE — PROGRESS NOTES
Physical Therapy Daily Progress Note      Patient: Akosua Munguia   : 1945  Diagnosis/ICD-10 Code:  Pain, neck [M54.2]  Referring practitioner: Jenny Keenan MD  Date of Initial Visit: Type: THERAPY  Noted: 2021  Today's Date: 2021  Patient seen for 10 sessions. POC 20, 2x/wk.            NDI:  10%  OSWESTRY:  30%    Subjective :  No pain currently. She notes that she wants today to be her last day as she knows that she is responsible for continuing to get better. She has a lot of dental work that needs to be done and needs to focus on that..   Pain ranging from 0/10 to 6/10. (back, L LE).  She notes overall improvement 50-60% improvement.     Objective   See Exercise, Manual, and Modality Logs for complete treatment.       Education: Reviewed HEP and issued remaining exercises. See chart.     Exercises  Shoulder External Rotation and Scapular Retraction with Resistance - 1 x daily - 4 x weekly - 2 sets - 10 reps  Standing Shoulder Horizontal Abduction with Resistance - 1 x daily - 4 x weekly - 2 sets - 10 reps    Assessment/Plan:  Goals met as noted.     Plan Goals: STG:  - Pt to report 25% improvement in pain with attempting to turn her head in 3 weeks.-MET  - Pt to report 25% improvement in L LE pain with sitting in 3 weeks.-MET  - Pt to be independent with HEP in 3 weeks.-MET  LTG:  - Improve NDI to 15% or less impairment by discharge.-MET  - Oswestry to indicate 20% or less impairment by discharge.  - Pt to demonstrate 50% rotation ROM with no increase in pain by discharge.  - Pt to report no L LE radicular pain with sitting and driving >30 min by discharge.  - Pt to rate max pain at 2-3/10 with activity by discharge.    DISCHARGE per patient request.  Discharge summary to follow.         Timed:                                                                           Manual Therapy:                 mins  94145;                  Therapeutic Exercise:  20       mins  46377;      Neuromuscular Leo:  10      mins  99546;    Therapeutic Activity:           mins  68195;     Gait Training:                      mins  21814;     Ultrasound:                          mins  07190;    Ionto                                   mins   99949  Can Repos                          mins 36896     Un-Timed:  Electrical Stimulation:        mins  33672 ( );  Dry Needling                       mins self-pay  Traction                               mins 59445  Low Eval                             Mins  54040  Mod Eval                             Mins  50791  High Eval                            Mins  03873  Self - Care                          mins  10169           Timed Treatment:   30   mins   Total Treatment:   30    mins      Nabila Ceja PTA  Physical Therapist Assistant

## 2022-02-04 ENCOUNTER — DOCUMENTATION (OUTPATIENT)
Dept: PHYSICAL THERAPY | Facility: CLINIC | Age: 77
End: 2022-02-04

## 2022-02-04 DIAGNOSIS — M54.12 RADICULOPATHY, CERVICAL: ICD-10-CM

## 2022-02-04 DIAGNOSIS — M54.2 PAIN, NECK: Primary | ICD-10-CM

## 2022-02-04 DIAGNOSIS — M54.16 RADICULOPATHY, LUMBAR REGION: ICD-10-CM

## 2022-02-04 NOTE — PROGRESS NOTES
Discharge Summary  Discharge Summary from Physical Therapy Report      Dates  PT visit: 9/16/202- 11/1/2021  Number of Visits: 10     Discharge Status of Patient: See Progress Note dated 11/1/2021    Goals: Partially Met   NDI:  10%  OSWESTRY:  30%  STG:  - Pt to report 25% improvement in pain with attempting to turn her head in 3 weeks.-MET  - Pt to report 25% improvement in L LE pain with sitting in 3 weeks.-MET  - Pt to be independent with HEP in 3 weeks.-MET  LTG:  - Improve NDI to 15% or less impairment by discharge.-MET  - Oswestry to indicate 20% or less impairment by discharge. - not met  - Pt to demonstrate 50% rotation ROM with no increase in pain by discharge.  - Pt to report no L LE radicular pain with sitting and driving >30 min by discharge.  - Pt to rate max pain at 2-3/10 with activity by discharge.    Discharge Plan: Continue with current home exercise program as instructed    Comments : Pt called and left message requesting D/C and cancelled remaining appts.    Date of Discharge 2/4/2022        Mylene Booth, PT  Physical Therapist  [place] Lic# [id number]

## 2022-03-28 ENCOUNTER — OFFICE VISIT (OUTPATIENT)
Dept: FAMILY MEDICINE CLINIC | Facility: CLINIC | Age: 77
End: 2022-03-28

## 2022-03-28 VITALS
BODY MASS INDEX: 26.92 KG/M2 | RESPIRATION RATE: 16 BRPM | SYSTOLIC BLOOD PRESSURE: 128 MMHG | TEMPERATURE: 97.3 F | OXYGEN SATURATION: 98 % | HEIGHT: 65 IN | HEART RATE: 61 BPM | DIASTOLIC BLOOD PRESSURE: 72 MMHG | WEIGHT: 161.6 LBS

## 2022-03-28 DIAGNOSIS — M51.06 LUMBAR DISC DISORDER WITH MYELOPATHY: ICD-10-CM

## 2022-03-28 DIAGNOSIS — E78.2 MIXED HYPERLIPIDEMIA: ICD-10-CM

## 2022-03-28 DIAGNOSIS — Z87.448 HISTORY OF RENAL INSUFFICIENCY: ICD-10-CM

## 2022-03-28 DIAGNOSIS — K21.9 GASTROESOPHAGEAL REFLUX DISEASE WITH STRICTURE: ICD-10-CM

## 2022-03-28 DIAGNOSIS — J30.89 CHRONIC NONSEASONAL ALLERGIC RHINITIS DUE TO POLLEN: ICD-10-CM

## 2022-03-28 DIAGNOSIS — E66.3 OVERWEIGHT: ICD-10-CM

## 2022-03-28 DIAGNOSIS — K57.30 DIVERTICULOSIS OF LARGE INTESTINE WITHOUT HEMORRHAGE: ICD-10-CM

## 2022-03-28 DIAGNOSIS — K22.2 GASTROESOPHAGEAL REFLUX DISEASE WITH STRICTURE: ICD-10-CM

## 2022-03-28 DIAGNOSIS — Z78.0 ASYMPTOMATIC MENOPAUSE: ICD-10-CM

## 2022-03-28 DIAGNOSIS — M85.89 OSTEOPENIA OF MULTIPLE SITES: ICD-10-CM

## 2022-03-28 DIAGNOSIS — Z00.00 MEDICARE ANNUAL WELLNESS VISIT, SUBSEQUENT: Primary | ICD-10-CM

## 2022-03-28 DIAGNOSIS — R35.0 URINARY FREQUENCY: ICD-10-CM

## 2022-03-28 DIAGNOSIS — J30.1 ACUTE SEASONAL ALLERGIC RHINITIS DUE TO POLLEN: ICD-10-CM

## 2022-03-28 DIAGNOSIS — M50.00 CERVICAL DISC DISORDER WITH MYELOPATHY: ICD-10-CM

## 2022-03-28 DIAGNOSIS — I10 HYPERTENSION, BENIGN: ICD-10-CM

## 2022-03-28 DIAGNOSIS — Z12.11 COLON CANCER SCREENING: ICD-10-CM

## 2022-03-28 DIAGNOSIS — Z12.31 ENCOUNTER FOR SCREENING MAMMOGRAM FOR MALIGNANT NEOPLASM OF BREAST: ICD-10-CM

## 2022-03-28 DIAGNOSIS — Z12.4 CERVICAL CANCER SCREENING: ICD-10-CM

## 2022-03-28 DIAGNOSIS — K44.9 HIATAL HERNIA: ICD-10-CM

## 2022-03-28 DIAGNOSIS — M15.9 GENERALIZED OSTEOARTHROSIS: ICD-10-CM

## 2022-03-28 DIAGNOSIS — R92.2 DENSE BREAST: ICD-10-CM

## 2022-03-28 DIAGNOSIS — R93.1 ELEVATED CORONARY ARTERY CALCIUM SCORE: ICD-10-CM

## 2022-03-28 PROBLEM — N18.31 STAGE 3A CHRONIC KIDNEY DISEASE: Status: RESOLVED | Noted: 2021-04-15 | Resolved: 2022-03-28

## 2022-03-28 PROCEDURE — 36415 COLL VENOUS BLD VENIPUNCTURE: CPT | Performed by: FAMILY MEDICINE

## 2022-03-28 PROCEDURE — 1159F MED LIST DOCD IN RCRD: CPT | Performed by: FAMILY MEDICINE

## 2022-03-28 PROCEDURE — G0439 PPPS, SUBSEQ VISIT: HCPCS | Performed by: FAMILY MEDICINE

## 2022-03-28 PROCEDURE — G0444 DEPRESSION SCREEN ANNUAL: HCPCS | Performed by: FAMILY MEDICINE

## 2022-03-28 PROCEDURE — 99214 OFFICE O/P EST MOD 30 MIN: CPT | Performed by: FAMILY MEDICINE

## 2022-03-28 PROCEDURE — 81002 URINALYSIS NONAUTO W/O SCOPE: CPT | Performed by: FAMILY MEDICINE

## 2022-03-28 RX ORDER — OMEPRAZOLE 20 MG/1
20 CAPSULE, DELAYED RELEASE ORAL DAILY
Qty: 90 CAPSULE | Refills: 4 | Status: SHIPPED | OUTPATIENT
Start: 2022-03-28 | End: 2022-10-26

## 2022-03-28 RX ORDER — ATENOLOL 25 MG/1
25 TABLET ORAL 2 TIMES DAILY
Qty: 180 TABLET | Refills: 4 | Status: SHIPPED | OUTPATIENT
Start: 2022-03-28

## 2022-03-28 RX ORDER — MONTELUKAST SODIUM 10 MG/1
10 TABLET ORAL DAILY
Qty: 90 TABLET | Refills: 3 | Status: SHIPPED | OUTPATIENT
Start: 2022-03-28 | End: 2022-10-26

## 2022-03-28 RX ORDER — HYDROCHLOROTHIAZIDE 25 MG/1
25 TABLET ORAL DAILY
Qty: 90 TABLET | Refills: 3 | Status: SHIPPED | OUTPATIENT
Start: 2022-03-28 | End: 2022-10-26

## 2022-03-28 RX ORDER — ROSUVASTATIN CALCIUM 5 MG/1
5 TABLET, COATED ORAL DAILY
Qty: 90 TABLET | Refills: 4 | Status: SHIPPED | OUTPATIENT
Start: 2022-03-28 | End: 2022-10-26 | Stop reason: SINTOL

## 2022-03-28 RX ORDER — LISINOPRIL 20 MG/1
20 TABLET ORAL DAILY
Qty: 90 TABLET | Refills: 4 | Status: SHIPPED | OUTPATIENT
Start: 2022-03-28 | End: 2022-10-26

## 2022-03-28 NOTE — PROGRESS NOTES
The ABCs of the Annual Wellness Visit  Subsequent Medicare Wellness Visit    Chief Complaint   Patient presents with   • Medicare Wellness-subsequent   • Hypertension   • Hyperlipidemia      Subjective    History of Present Illness:  Akosua Munguia is a 76 y.o. female who presents for a Subsequent Medicare Wellness Visit. The patient is here: for coordination of medical care to discuss health maintenance and disease prevention. Overall has: moderate activity with work/home activities, exercises 2 - 3 times per week, good appetite, feels well with no complaints, good energy level and is sleeping well. Nutrition: balanced diet. Last tetanus shot was 2014.    Hypertension  This is a chronic problem. The current episode started more than 1 year ago. The problem is controlled. Pertinent negatives include no chest pain, headaches, malaise/fatigue, orthopnea, palpitations, peripheral edema, PND, shortness of breath or sweats. Risk factors for coronary artery disease include dyslipidemia and post-menopausal state. Current antihypertension treatment includes ACE inhibitors and beta blockers. The current treatment provides moderate improvement.   Hyperlipidemia  This is a chronic problem. The current episode started more than 1 year ago. The problem is uncontrolled. Recent lipid tests were reviewed and are high. She has no history of diabetes, hypothyroidism or obesity. Pertinent negatives include no chest pain, myalgias or shortness of breath. Current antihyperlipidemic treatment includes statins. The current treatment provides moderate improvement of lipids. Risk factors for coronary artery disease include dyslipidemia, hypertension, post-menopausal and family history.   Chronic Kidney Disease  This is a chronic problem. The current episode started more than 1 year ago. Associated symptoms include arthralgias (multiple moderate controlled with tylenol ). Pertinent negatives include no abdominal pain, chest pain, congestion,  coughing, fatigue, fever, headaches, joint swelling, myalgias, nausea, numbness, rash, sore throat, vomiting or weakness. The treatment provided moderate relief.       The following portions of the patient's history were reviewed and   updated as appropriate: allergies, current medications, past family history, past medical history, past social history, past surgical history and problem list.     Compared to one year ago, the patient feels her physical   health is the same.    Compared to one year ago, the patient feels her mental   health is the same.    Recent Hospitalizations:  She was not admitted to the hospital during the last year.       Current Medical Providers:  Patient Care Team:  Jenny Keenan MD as PCP - General  Alexandru Baker MD as Consulting Physician (Nephrology)  Banet, Duane Edward, MD as Consulting Physician (Dermatology)    Outpatient Medications Prior to Visit   Medication Sig Dispense Refill   • aspirin 81 MG EC tablet Take 81 mg by mouth Daily.     • cetirizine (zyrTEC) 10 MG tablet Take 10 mg by mouth Daily.     • Mirabegron ER (MYRBETRIQ) 50 MG tablet sustained-release 24 hour 24 hr tablet Take 50 mg by mouth Every Other Day.     • atenolol (TENORMIN) 25 MG tablet TAKE ONE TABLET BY MOUTH TWO TIMES A  tablet 4   • hydroCHLOROthiazide (HYDRODIURIL) 25 MG tablet TAKE ONE TABLET BY MOUTH ONCE DAILY 30 tablet 12   • lisinopril (PRINIVIL,ZESTRIL) 20 MG tablet TAKE ONE TABLET BY MOUTH ONCE DAILY FOR BLOOD PRESSURE 90 tablet 4   • montelukast (SINGULAIR) 10 MG tablet TAKE ONE TABLET BY MOUTH ONCE DAILY 90 tablet 3   • omeprazole (priLOSEC) 20 MG capsule TAKE ONE CAPSULE BY MOUTH ONCE DAILY 90 capsule 4   • rosuvastatin (CRESTOR) 5 MG tablet TAKE ONE TABLET BY MOUTH ONCE DAILY 90 tablet 4     No facility-administered medications prior to visit.       No opioid medication identified on active medication list. I have reviewed chart for other potential  high risk medication/s and harmful drug  interactions in the elderly.          Aspirin is on active medication list. Aspirin use is indicated based on review of current medical condition/s. Pros and cons of this therapy have been discussed today. Benefits of this medication outweigh potential harm.  Patient has been encouraged to continue taking this medication.  .      Patient Active Problem List   Diagnosis   • Chronic nonseasonal allergic rhinitis due to pollen   • Asymptomatic menopause   • Cervical disc disorder with myelopathy   • Dense breast   • Diverticulosis of large intestine without hemorrhage   • Gastroesophageal reflux disease with stricture   • Generalized osteoarthrosis   • Hiatal hernia   • History of kidney stones   • Hypertension, benign   • Lumbar disc disorder with myelopathy   • Mixed hyperlipidemia   • Osteopenia of multiple sites   • Overweight   • Urinary incontinence in female   • Medicare annual wellness visit, subsequent   • Encounter for screening mammogram for malignant neoplasm of breast   • Colon cancer screening   • Cervical cancer screening   • Elevated coronary artery calcium score   • History of 2019 novel coronavirus disease (COVID-19)   • History of renal insufficiency     Advance Care Planning   Advance Directive is on file.  ACP discussion was held with the patient during this visit. Akosua does have an advance directive that has been updated. She will bring in a copy.    A face-to-face visit was completed today with patient.  Counseling explanation, and discussion of advanced directives was performed.   The last advanced care visit was performed in 2021.  In a near life ending situation, from which she is not expected to recover functionally, and she is not able to speak for herself, she does not want life sustaining measures. We discussed feeding tubes, ventilators and cardiac support as well as dialysis.    I spent less than 16 minutes discussing Advanced Care Planning information and documenting in the  "chart.    Review of Systems   Constitutional: Negative for activity change, appetite change, fatigue, fever, malaise/fatigue and unexpected weight change.   HENT: Negative for congestion, hearing loss, rhinorrhea, sinus pain, sore throat, tinnitus, trouble swallowing and voice change.    Eyes: Negative for visual disturbance.   Respiratory: Negative for apnea, cough, shortness of breath and wheezing.    Cardiovascular: Negative for chest pain, palpitations, orthopnea and PND.   Gastrointestinal: Negative for abdominal pain, blood in stool, constipation, diarrhea, nausea and vomiting.   Endocrine: Negative for cold intolerance, heat intolerance, polydipsia and polyuria.   Genitourinary: Negative for difficulty urinating, dysuria, flank pain, frequency and hematuria.   Musculoskeletal: Positive for arthralgias (multiple moderate controlled with tylenol ). Negative for joint swelling and myalgias.   Skin: Negative for rash.   Allergic/Immunologic: Negative for environmental allergies and immunocompromised state.   Neurological: Negative for dizziness, syncope, weakness, numbness and headaches.   Hematological: Negative for adenopathy. Does not bruise/bleed easily.   Psychiatric/Behavioral: Negative for dysphoric mood and suicidal ideas. The patient is not nervous/anxious.        I have reviewed and confirmed the accuracy of the HPI and ROS as documented by the MA/LPN/RN Jenny Keenan MD    Reviewed chart for potential of high risk medication in the elderly: yes  Reviewed chart for potential of harmful drug interactions in the elderly:yes       Objective       Vitals:    03/28/22 1026   BP: 128/72   BP Location: Right arm   Patient Position: Sitting   Cuff Size: Adult   Pulse: 61   Resp: 16   Temp: 97.3 °F (36.3 °C)   TempSrc: Temporal   SpO2: 98%  Comment: Room air   Weight: 73.3 kg (161 lb 9.6 oz)   Height: 165.1 cm (65\")   PainSc: 0-No pain     Body mass index is 26.89 kg/m².  BMI has not been calculated during " today's encounter.     Does the patient have evidence of cognitive impairment? No    Physical Exam  Constitutional:       General: She is not in acute distress.     Appearance: She is well-developed.   HENT:      Head: Normocephalic. Hair is normal.      Right Ear: Tympanic membrane and external ear normal.      Left Ear: Tympanic membrane and external ear normal.      Nose: Nose normal. No mucosal edema.      Mouth/Throat:      Pharynx: Uvula midline.   Eyes:      General:         Right eye: No discharge.         Left eye: No discharge.      Conjunctiva/sclera: Conjunctivae normal.      Pupils: Pupils are equal, round, and reactive to light.   Neck:      Thyroid: No thyromegaly.      Vascular: No JVD.   Cardiovascular:      Rate and Rhythm: Normal rate and regular rhythm.      Chest Wall: PMI is not displaced.      Pulses:           Carotid pulses are 2+ on the right side and 2+ on the left side.       Femoral pulses are 2+ on the right side and 2+ on the left side.       Dorsalis pedis pulses are 2+ on the right side and 2+ on the left side.      Heart sounds: Normal heart sounds. No murmur heard.    No friction rub. No gallop.      Comments: Negative Homans' no edema  Pulmonary:      Effort: Pulmonary effort is normal. No respiratory distress.      Breath sounds: Normal breath sounds. No decreased breath sounds, wheezing, rhonchi or rales.   Chest:   Breasts: Breasts are symmetrical.      Right: No inverted nipple, mass, nipple discharge, skin change, tenderness or supraclavicular adenopathy.      Left: No inverted nipple, mass, nipple discharge, skin change, tenderness or supraclavicular adenopathy.       Abdominal:      General: Bowel sounds are normal. There is no distension or abdominal bruit.      Palpations: Abdomen is soft. There is no mass.      Tenderness: There is no abdominal tenderness.   Musculoskeletal:         General: Deformity ( distal fingers, decreased ROM ) present. No tenderness.       Cervical back: Normal range of motion and neck supple. No muscular tenderness.   Lymphadenopathy:      Cervical: No cervical adenopathy.      Upper Body:      Right upper body: No supraclavicular adenopathy.      Left upper body: No supraclavicular adenopathy.   Skin:     General: Skin is warm and dry.      Findings: No ecchymosis, erythema, lesion or rash.   Neurological:      Mental Status: She is alert and oriented to person, place, and time.      Cranial Nerves: No cranial nerve deficit.      Sensory: No sensory deficit.      Motor: No abnormal muscle tone.      Coordination: Coordination normal.      Deep Tendon Reflexes: Reflexes are normal and symmetric. Reflexes normal.   Psychiatric:         Speech: Speech normal.         Behavior: Behavior normal.         Thought Content: Thought content normal. Thought content does not include suicidal ideation.         Cognition and Memory: She does not exhibit impaired recent memory or impaired remote memory.         Judgment: Judgment normal. Judgment is not impulsive.                 HEALTH RISK ASSESSMENT    Smoking Status:  Social History     Tobacco Use   Smoking Status Never Smoker   Smokeless Tobacco Never Used   Tobacco Comment    No Smoke Exposure     Alcohol Consumption:  Social History     Substance and Sexual Activity   Alcohol Use Not Currently     Fall Risk Screen:    [facility] Fall Risk Assessment was completed, and patient is at LOW risk for falls.Assessment completed on:3/28/2022    Depression Screening:  PHQ-2/PHQ-9 Depression Screening 3/28/2022   Retired Total Score -   Little Interest or Pleasure in Doing Things 0-->not at all   Feeling Down, Depressed or Hopeless 0-->not at all   PHQ-9: Brief Depression Severity Measure Score 0       Health Habits and Functional and Cognitive Screening:  Functional & Cognitive Status 3/28/2022   Do you have difficulty preparing food and eating? No   Do you have difficulty bathing yourself, getting dressed or grooming  yourself? No   Do you have difficulty using the toilet? No   Do you have difficulty moving around from place to place? No   Do you have trouble with steps or getting out of a bed or a chair? No   Current Diet Well Balanced Diet   Dental Exam Up to date        Dental Exam Comment 03/2022 Dr. Gamez   Eye Exam Not up to date        Eye Exam Comment [facility]   Exercise (times per week) 7 times per week   Current Exercises Include Yard Work;Walking   Current Exercise Activities Include -   Do you need help using the phone?  No   Are you deaf or do you have serious difficulty hearing?  No   Do you need help with transportation? No   Do you need help shopping? No   Do you need help preparing meals?  No   Do you need help with housework?  No   Do you need help with laundry? No   Do you need help taking your medications? No   Do you need help managing money? No   Do you ever drive or ride in a car without wearing a seat belt? No   Have you felt unusual stress, anger or loneliness in the last month? No   Who do you live with? Spouse   If you need help, do you have trouble finding someone available to you? No   Have you been bothered in the last four weeks by sexual problems? No   Do you have difficulty concentrating, remembering or making decisions? No       Age-appropriate Screening Schedule:  Refer to the list below for future screening recommendations based on patient's age, sex and/or medical conditions. Orders for these recommended tests are listed in the plan section. The patient has been provided with a written plan.    Health Maintenance   Topic Date Due   • LIPID PANEL  03/26/2022   • DXA SCAN  03/28/2022 (Originally 2/9/2019)   • TDAP/TD VACCINES (4 - Td or Tdap) 11/05/2024   • INFLUENZA VACCINE  Completed   • ZOSTER VACCINE  Completed              Assessment/Plan     CMS Preventative Services Quick Reference  Risk Factors Identified During Encounter  Cardiovascular Disease  The above risks/problems have  been discussed with the patient.  Follow up actions/plans if indicated are seen below in the Assessment/Plan Section.  Pertinent information has been shared with the patient in the After Visit Summary.    Problem List Items Addressed This Visit        High    Hypertension, benign    Overview     Controlled. She is compliant [facility] meds.           Current Assessment & Plan     Hypertension is improving with treatment.  Continue current treatment regimen.  Dietary sodium restriction.  Weight loss.  Regular aerobic exercise.  Blood pressure will be reassessed 1 yr.           Relevant Medications    atenolol (TENORMIN) 25 MG tablet    hydroCHLOROthiazide (HYDRODIURIL) 25 MG tablet    lisinopril (PRINIVIL,ZESTRIL) 20 MG tablet    Other Relevant Orders    CBC & Differential    Comprehensive Metabolic Panel    TSH       Medium    Mixed hyperlipidemia    Overview     Stable she is compliant with statin Rx which is continued.            Current Assessment & Plan     Lipid abnormalities are improving with treatment.  Pharmacotherapy as ordered.  Lipids will be reassessed in 1 year.           Relevant Medications    rosuvastatin (CRESTOR) 5 MG tablet    Other Relevant Orders    Lipid Panel With / Chol / HDL Ratio       Low    Chronic nonseasonal allergic rhinitis due to pollen    Overview     Stable on prn meds           Relevant Medications    montelukast (SINGULAIR) 10 MG tablet    Dense breast    Overview     Akosua Munguia's 5 year risk of having breast cancer is 3.2%. The average woman at age 76 y.o. has a risk of 2.2% of having breast cancer.  Akosua Munguia's life time risk of having breast cancer up to age 90 is 6.4%. The average woman's chance of having breast cancer up to the age of 90 is 4.4%.    We discussed the risk assessment values with Akosua Munguia, she understands that she is at more than average risk of developing breast cancer at 5 years and over her life time than the average woman of her age. She does not  desire to have genetic testing or further work up at this time.                Generalized osteoarthrosis    Overview     Stable with tylenol she avoids ant inflammatories given renal function           Lumbar disc disorder with myelopathy    Overview     Exacerbation of  sxs at present. She practices good back mechanics. Ice and tylenol.           Osteopenia of multiple sites    Overview     Declined Dexa   Continue calcium 1200 mg with vitamin D and wt bearing exercise.   -0.8 spine,-0.8 FN,-0.2 hip, 2017   -1.2 Spine, 0.5 FN,--0.8 Hip-2014           Overweight    Overview     Healthy diet and regular exercise           Current Assessment & Plan     Patient's (Body mass index is 26.89 kg/m².) indicates that they are overweight with health conditions that include hypertension . Weight is improving with treatment. BMI is is above average; BMI management plan is completed. We discussed portion control and increasing exercise.               Unprioritized    Asymptomatic menopause    Overview     Declined  Last dexa scan 01/19/2017 -0.8 spine, -0.8 FN, -0.2 Hip           Cervical disc disorder with myelopathy    Overview     Exacerbation of sxs. Resume PT. Ice heat ROM and Tylenol in the interim.  CKD will no allow anti inflammatories. She declines steroids.            Diverticulosis of large intestine without hemorrhage    Overview     No sxs            Gastroesophageal reflux disease with stricture    Overview     minimal grade A with distal esophageal stricture dilated to 52 [place] Dr. Casillas 2018  Sx are stable on meds           Relevant Medications    omeprazole (priLOSEC) 20 MG capsule    Hiatal hernia    Overview     ECG. Dr. Casillas stable on PPI           Medicare annual wellness visit, subsequent - Primary    Overview     Diet exercise breast self exams seat belts, fall risk, sunscreens discussed and encouraged, Previous lab reviewed in detail. We notified her of today's lab           Relevant Orders    POCT  urinalysis dipstick, manual (Completed)    Encounter for screening mammogram for malignant neoplasm of breast    Overview     Last mammogram 04/07/2021 dense   02/11/2020 heterogeneously dense.            Relevant Orders    Mammo Screening Digital Tomosynthesis Bilateral With CAD    Colon cancer screening    Overview     Last colonoscopy 05/17/2018. Repeat 2023           Cervical cancer screening    Overview     Last pap smear 12/09/2013 negative  S/p hysterectomy due to ovarian cysts           Elevated coronary artery calcium score    Overview     119 in LAD remainder are 0  Continue statin Rx dose increased. Follow with repeat in severeal years.            Relevant Medications    atenolol (TENORMIN) 25 MG tablet    History of renal insufficiency    Overview     She has been followed with nephrology             Other Visit Diagnoses     Acute seasonal allergic rhinitis due to pollen        Relevant Medications    montelukast (SINGULAIR) 10 MG tablet    Urinary frequency        Relevant Orders    Urinalysis With Microscopic - Urine, Clean Catch           Follow Up:   Return in about 1 year (around 3/28/2023).     An After Visit Summary and PPPS were given to the patient.           Site care done- cleaned with alcohol swab, procedure tolerated well, dressing applied. Venipuncture was obtained after 1 time(s). 2 tubes were drawn. Needle gauge used was 23-butterfly.

## 2022-03-28 NOTE — ASSESSMENT & PLAN NOTE
Hypertension is improving with treatment.  Continue current treatment regimen.  Dietary sodium restriction.  Weight loss.  Regular aerobic exercise.  Blood pressure will be reassessed 1 yr.

## 2022-03-29 LAB
ALBUMIN SERPL-MCNC: 4.6 G/DL (ref 3.7–4.7)
ALBUMIN/GLOB SERPL: 2 {RATIO} (ref 1.2–2.2)
ALP SERPL-CCNC: 63 IU/L (ref 44–121)
ALT SERPL-CCNC: 23 IU/L (ref 0–32)
APPEARANCE UR: CLEAR
AST SERPL-CCNC: 22 IU/L (ref 0–40)
BACTERIA #/AREA URNS HPF: NORMAL /[HPF]
BASOPHILS # BLD AUTO: 0 X10E3/UL (ref 0–0.2)
BASOPHILS NFR BLD AUTO: 1 %
BILIRUB SERPL-MCNC: 0.3 MG/DL (ref 0–1.2)
BILIRUB UR QL STRIP: NEGATIVE
BUN SERPL-MCNC: 16 MG/DL (ref 8–27)
BUN/CREAT SERPL: 15 (ref 12–28)
CALCIUM SERPL-MCNC: 9.5 MG/DL (ref 8.7–10.3)
CASTS URNS QL MICRO: NORMAL /LPF
CHLORIDE SERPL-SCNC: 101 MMOL/L (ref 96–106)
CHOLEST SERPL-MCNC: 204 MG/DL (ref 100–199)
CHOLEST/HDLC SERPL: 3.4 RATIO (ref 0–4.4)
CO2 SERPL-SCNC: 24 MMOL/L (ref 20–29)
COLOR UR: YELLOW
CREAT SERPL-MCNC: 1.06 MG/DL (ref 0.57–1)
EGFRCR SERPLBLD CKD-EPI 2021: 54 ML/MIN/1.73
EOSINOPHIL # BLD AUTO: 0.1 X10E3/UL (ref 0–0.4)
EOSINOPHIL NFR BLD AUTO: 3 %
EPI CELLS #/AREA URNS HPF: NORMAL /HPF (ref 0–10)
ERYTHROCYTE [DISTWIDTH] IN BLOOD BY AUTOMATED COUNT: 13 % (ref 11.7–15.4)
GLOBULIN SER CALC-MCNC: 2.3 G/DL (ref 1.5–4.5)
GLUCOSE SERPL-MCNC: 100 MG/DL (ref 65–99)
GLUCOSE UR QL STRIP: NEGATIVE
HCT VFR BLD AUTO: 37.1 % (ref 34–46.6)
HDLC SERPL-MCNC: 60 MG/DL
HGB BLD-MCNC: 13.7 G/DL (ref 11.1–15.9)
HGB UR QL STRIP: NEGATIVE
IMM GRANULOCYTES # BLD AUTO: 0 X10E3/UL (ref 0–0.1)
IMM GRANULOCYTES NFR BLD AUTO: 0 %
KETONES UR QL STRIP: NEGATIVE
LDLC SERPL CALC-MCNC: 111 MG/DL (ref 0–99)
LEUKOCYTE ESTERASE UR QL STRIP: ABNORMAL
LYMPHOCYTES # BLD AUTO: 1.2 X10E3/UL (ref 0.7–3.1)
LYMPHOCYTES NFR BLD AUTO: 24 %
MCH RBC QN AUTO: 34.7 PG (ref 26.6–33)
MCHC RBC AUTO-ENTMCNC: 36.9 G/DL (ref 31.5–35.7)
MCV RBC AUTO: 94 FL (ref 79–97)
MICRO URNS: ABNORMAL
MONOCYTES # BLD AUTO: 0.6 X10E3/UL (ref 0.1–0.9)
MONOCYTES NFR BLD AUTO: 11 %
MORPHOLOGY BLD-IMP: ABNORMAL
NEUTROPHILS # BLD AUTO: 3.1 X10E3/UL (ref 1.4–7)
NEUTROPHILS NFR BLD AUTO: 61 %
NITRITE UR QL STRIP: NEGATIVE
PH UR STRIP: 5.5 [PH] (ref 5–7.5)
PLATELET # BLD AUTO: 221 X10E3/UL (ref 150–450)
POTASSIUM SERPL-SCNC: 4.1 MMOL/L (ref 3.5–5.2)
PROT SERPL-MCNC: 6.9 G/DL (ref 6–8.5)
PROT UR QL STRIP: NEGATIVE
RBC # BLD AUTO: 3.95 X10E6/UL (ref 3.77–5.28)
RBC #/AREA URNS HPF: NORMAL /HPF (ref 0–2)
SODIUM SERPL-SCNC: 141 MMOL/L (ref 134–144)
SP GR UR STRIP: 1.02 (ref 1–1.03)
TRIGL SERPL-MCNC: 191 MG/DL (ref 0–149)
TSH SERPL DL<=0.005 MIU/L-ACNC: 2.15 UIU/ML (ref 0.45–4.5)
UROBILINOGEN UR STRIP-MCNC: 0.2 MG/DL (ref 0.2–1)
VLDLC SERPL CALC-MCNC: 33 MG/DL (ref 5–40)
WBC # BLD AUTO: 5.1 X10E3/UL (ref 3.4–10.8)
WBC #/AREA URNS HPF: NORMAL /HPF (ref 0–5)

## 2022-03-29 NOTE — ASSESSMENT & PLAN NOTE
Patient's (Body mass index is 26.89 kg/m².) indicates that they are overweight with health conditions that include hypertension . Weight is improving with treatment. BMI is is above average; BMI management plan is completed. We discussed portion control and increasing exercise.

## 2022-04-11 ENCOUNTER — HOSPITAL ENCOUNTER (OUTPATIENT)
Dept: MAMMOGRAPHY | Facility: HOSPITAL | Age: 77
Discharge: HOME | End: 2022-04-11
Admitting: FAMILY MEDICINE

## 2022-04-11 DIAGNOSIS — Z12.31 ENCOUNTER FOR SCREENING MAMMOGRAM FOR MALIGNANT NEOPLASM OF BREAST: ICD-10-CM

## 2022-04-11 PROCEDURE — 77063 BREAST TOMOSYNTHESIS BI: CPT

## 2022-04-11 PROCEDURE — 77067 SCR MAMMO BI INCL CAD: CPT

## 2022-06-27 ENCOUNTER — OFFICE VISIT (OUTPATIENT)
Dept: FAMILY MEDICINE CLINIC | Facility: CLINIC | Age: 77
End: 2022-06-27

## 2022-06-27 ENCOUNTER — HOSPITAL ENCOUNTER (OUTPATIENT)
Dept: GENERAL RADIOLOGY | Facility: HOSPITAL | Age: 77
Discharge: HOME | End: 2022-06-27
Admitting: FAMILY MEDICINE

## 2022-06-27 VITALS
TEMPERATURE: 97.7 F | BODY MASS INDEX: 26.86 KG/M2 | RESPIRATION RATE: 16 BRPM | HEART RATE: 52 BPM | OXYGEN SATURATION: 96 % | SYSTOLIC BLOOD PRESSURE: 122 MMHG | WEIGHT: 161.2 LBS | DIASTOLIC BLOOD PRESSURE: 80 MMHG | HEIGHT: 65 IN

## 2022-06-27 DIAGNOSIS — I10 HYPERTENSION, BENIGN: ICD-10-CM

## 2022-06-27 DIAGNOSIS — M25.519 SHOULDER PAIN, UNSPECIFIED CHRONICITY, UNSPECIFIED LATERALITY: Primary | ICD-10-CM

## 2022-06-27 DIAGNOSIS — E66.3 OVERWEIGHT WITH BODY MASS INDEX (BMI) OF 26 TO 26.9 IN ADULT: ICD-10-CM

## 2022-06-27 PROCEDURE — 73030 X-RAY EXAM OF SHOULDER: CPT

## 2022-06-27 PROCEDURE — 99213 OFFICE O/P EST LOW 20 MIN: CPT | Performed by: FAMILY MEDICINE

## 2022-06-27 RX ORDER — PREDNISONE 10 MG/1
10 TABLET ORAL TAKE AS DIRECTED
Qty: 35 TABLET | Refills: 0 | Status: SHIPPED | OUTPATIENT
Start: 2022-06-27 | End: 2022-07-12

## 2022-06-28 ENCOUNTER — TELEPHONE (OUTPATIENT)
Dept: FAMILY MEDICINE CLINIC | Facility: CLINIC | Age: 77
End: 2022-06-28

## 2022-06-28 NOTE — TELEPHONE ENCOUNTER
Called Akosua, left message shoulder shows  mild to moderate arthritis. We can arrange MRI if you would like.

## 2022-07-10 PROBLEM — M19.012 PRIMARY OSTEOARTHRITIS OF LEFT SHOULDER: Status: ACTIVE | Noted: 2022-07-10

## 2022-07-10 NOTE — ASSESSMENT & PLAN NOTE
Patient's (Body mass index is 26.83 kg/m².) indicates that they are overweight with health conditions that include hypertension . Weight is unchanged. BMI is is above average; BMI management plan is completed. We discussed portion control and increasing exercise.

## 2022-08-02 ENCOUNTER — TELEPHONE (OUTPATIENT)
Dept: FAMILY MEDICINE CLINIC | Facility: CLINIC | Age: 77
End: 2022-08-02

## 2022-08-02 DIAGNOSIS — M19.012 PRIMARY OSTEOARTHRITIS OF LEFT SHOULDER: Primary | ICD-10-CM

## 2022-08-02 NOTE — TELEPHONE ENCOUNTER
Spoke with Akosua, she requested MRI of left shoulder as discussed at pervious visit. Akosua requested it be done at [facility]

## 2022-08-02 NOTE — TELEPHONE ENCOUNTER
Caller: Akosua Munguia    Relationship to patient: Self    Best call back number: [phone]    Patient is needing: PATIENT IS REQUESTING A CALLBACK FROM [facility] TO DISCUSS MRI FOR SHOULDER

## 2022-08-11 ENCOUNTER — TELEPHONE (OUTPATIENT)
Dept: FAMILY MEDICINE CLINIC | Facility: CLINIC | Age: 77
End: 2022-08-11

## 2022-08-11 DIAGNOSIS — M25.519 SHOULDER PAIN, UNSPECIFIED CHRONICITY, UNSPECIFIED LATERALITY: Primary | ICD-10-CM

## 2022-08-11 NOTE — TELEPHONE ENCOUNTER
Patient called back into the office in regards to who she wanted to see for ortho.  She was recommended for Dr. Michaels in [place].  If he won't be able to she is open to any other providers that Joanne recommends.  Mentioned I would send this back to the nurse.  Akosua can be reached at [phone].

## 2022-08-22 ENCOUNTER — PREP FOR SURGERY (OUTPATIENT)
Dept: OTHER | Facility: HOSPITAL | Age: 77
End: 2022-08-22

## 2022-08-22 ENCOUNTER — OFFICE VISIT (OUTPATIENT)
Dept: ORTHOPEDIC SURGERY | Facility: CLINIC | Age: 77
End: 2022-08-22

## 2022-08-22 VITALS — HEART RATE: 50 BPM | BODY MASS INDEX: 26.82 KG/M2 | WEIGHT: 161 LBS | HEIGHT: 65 IN

## 2022-08-22 DIAGNOSIS — M75.02 ADHESIVE CAPSULITIS OF LEFT SHOULDER: Primary | ICD-10-CM

## 2022-08-22 DIAGNOSIS — R94.120 ABNORMAL AUDITORY FUNCTION STUDY: ICD-10-CM

## 2022-08-22 DIAGNOSIS — R79.1 ABNORMAL COAGULATION PROFILE: ICD-10-CM

## 2022-08-22 DIAGNOSIS — R94.5 ABNORMAL RESULTS OF LIVER FUNCTION STUDIES: ICD-10-CM

## 2022-08-22 PROCEDURE — 99204 OFFICE O/P NEW MOD 45 MIN: CPT | Performed by: ORTHOPAEDIC SURGERY

## 2022-08-22 NOTE — PROGRESS NOTES
Patient ID: Akosua Munguia is a 76 y.o. female.    Chief Complaint:    Chief Complaint   Patient presents with   • Left Shoulder - Initial Evaluation, Pain     Pain 5-6       HPI:  This is a 76-year-old female here who injured her left shoulder earlier this year after falling off a tractor.  She is developed pain deep in the shoulder with stiffness.  It is getting worse including pain at night not improved with physical therapy or oral medication  Past Medical History:   Diagnosis Date   • Acute seasonal allergic rhinitis due to pollen    • Asymptomatic menopause    • Cervical disc disorder with myelopathy    • Chronic renal failure, stage 3 (moderate) (HCC)     GFR 30-59   • Dense breast    • Dependent edema    • Diverticulosis of large intestine without hemorrhage    • Dysphagia, idiopathic    • Fibrocystic breast    • Gastroesophageal reflux disease with stricture 05/17/2018    minimal grade A with distal esophageal stricture dilated to [address] [place] Dr. Casillas   • Generalized osteoarthrosis    • Hiatal hernia 05/17/2018    ECG. Dr. Casillas   • History of kidney stones 2015   • Hypertension, benign 2008   • Insomnia, organic    • Lumbar disc disorder with myelopathy    • Mixed hyperlipidemia 2008   • Osteopenia of multiple sites 2014    -0.8 spine,-0.8 FN,-0.2 hip, 2017  -1.2 Spine, 0.5 FN,--0.8 Hip-2014   • Overweight    • Sciatica    • Urinary incontinence in female    • Variants of migraine     without mention of intractable migraine       Past Surgical History:   Procedure Laterality Date   • COLONOSCOPY  05/17/2018    Within Normal Limits, Results: sigmoid polyp, negative for adenoma Dr. Casillas 02/2014 rectal polyp otherwise negative.[place]ner 2002, normal   • OOPHORECTOMY Left 1986   • VAGINAL HYSTERECTOMY  1986       Family History   Problem Relation Age of Onset   • Breast cancer Mother 53   • Coronary artery disease Father    • Lung cancer Brother           Social History     Occupational History   •  "Not on file   Tobacco Use   • Smoking status: Never Smoker   • Smokeless tobacco: Never Used   • Tobacco comment: No Smoke Exposure   Vaping Use   • Vaping Use: Never used   Substance and Sexual Activity   • Alcohol use: Not Currently   • Drug use: Not Currently   • Sexual activity: Defer      Review of Systems   Cardiovascular: Negative for chest pain.   Musculoskeletal: Positive for arthralgias.       Objective:    Pulse 50   Ht 165.1 cm (65\")   Wt 73 kg (161 lb)   LMP 01/01/1986   BMI 26.79 kg/m²     Physical Examination:  Left shoulder demonstrates intact skin she has mild pain over the bicep groove passive elevation 100 external rotation 20 internal rotation of left hip she has pain but no weakness on [place] supraspinatus testing.  Belly press and lift off are 5/5 and 4/5.  Sensory and motor exam are intact all distributions. Radial pulse is palpable and capillary refill is less than two seconds to all digits.    Imaging:  Previous x-ray and MRI demonstrate well-maintained joint spaces with adhesive capsulitis small undersurface supraspinatus tear    Assessment:    Frozen shoulder left side  Plan:  Conservative and surgical options were discussed.  She would like to proceed with left shoulder manipulation.  Possible of anesthesia risk fracture recurrence were discussed        Procedures         Disclaimer: Part of this note may be an electronic transcription/translation of spoken language to printed text using the Dragon Dictation System  "

## 2022-08-25 ENCOUNTER — PATIENT ROUNDING (BHMG ONLY) (OUTPATIENT)
Dept: ORTHOPEDIC SURGERY | Facility: CLINIC | Age: 77
End: 2022-08-25

## 2022-08-25 NOTE — PROGRESS NOTES
A my chart message has been sent to the patient for PATIENT ROUNDING with [facility]

## 2022-08-26 PROBLEM — M75.02 ADHESIVE CAPSULITIS OF LEFT SHOULDER: Status: ACTIVE | Noted: 2022-08-26

## 2022-08-29 ENCOUNTER — LAB (OUTPATIENT)
Dept: LAB | Facility: HOSPITAL | Age: 77
End: 2022-08-29

## 2022-08-29 ENCOUNTER — HOSPITAL ENCOUNTER (OUTPATIENT)
Dept: CARDIOLOGY | Facility: HOSPITAL | Age: 77
Discharge: HOME | End: 2022-08-29

## 2022-08-29 DIAGNOSIS — M75.02 ADHESIVE CAPSULITIS OF LEFT SHOULDER: ICD-10-CM

## 2022-08-29 DIAGNOSIS — R94.120 ABNORMAL AUDITORY FUNCTION STUDY: ICD-10-CM

## 2022-08-29 DIAGNOSIS — R94.5 ABNORMAL RESULTS OF LIVER FUNCTION STUDIES: ICD-10-CM

## 2022-08-29 LAB
ANION GAP SERPL CALCULATED.3IONS-SCNC: 10.4 MMOL/L (ref 5–15)
APTT PPP: 26.5 SECONDS (ref 24–31)
BASOPHILS # BLD AUTO: 0.04 10*3/MM3 (ref 0–0.2)
BASOPHILS NFR BLD AUTO: 0.7 % (ref 0–1.5)
BUN SERPL-MCNC: 17 MG/DL (ref 8–23)
BUN/CREAT SERPL: 16.8 (ref 7–25)
CALCIUM SPEC-SCNC: 9.5 MG/DL (ref 8.6–10.5)
CHLORIDE SERPL-SCNC: 102 MMOL/L (ref 98–107)
CO2 SERPL-SCNC: 27.6 MMOL/L (ref 22–29)
CREAT SERPL-MCNC: 1.01 MG/DL (ref 0.57–1)
DEPRECATED RDW RBC AUTO: 40.1 FL (ref 37–54)
EGFRCR SERPLBLD CKD-EPI 2021: 57.8 ML/MIN/1.73
EOSINOPHIL # BLD AUTO: 0.13 10*3/MM3 (ref 0–0.4)
EOSINOPHIL NFR BLD AUTO: 2.4 % (ref 0.3–6.2)
ERYTHROCYTE [DISTWIDTH] IN BLOOD BY AUTOMATED COUNT: 12.3 % (ref 12.3–15.4)
GLUCOSE SERPL-MCNC: 84 MG/DL (ref 65–99)
HCT VFR BLD AUTO: 38.4 % (ref 34–46.6)
HGB BLD-MCNC: 13.6 G/DL (ref 12–15.9)
IMM GRANULOCYTES # BLD AUTO: 0.02 10*3/MM3 (ref 0–0.05)
IMM GRANULOCYTES NFR BLD AUTO: 0.4 % (ref 0–0.5)
INR PPP: 0.95 (ref 0.93–1.1)
LYMPHOCYTES # BLD AUTO: 1.41 10*3/MM3 (ref 0.7–3.1)
LYMPHOCYTES NFR BLD AUTO: 26.3 % (ref 19.6–45.3)
MCH RBC QN AUTO: 32.2 PG (ref 26.6–33)
MCHC RBC AUTO-ENTMCNC: 35.4 G/DL (ref 31.5–35.7)
MCV RBC AUTO: 91 FL (ref 79–97)
MONOCYTES # BLD AUTO: 0.62 10*3/MM3 (ref 0.1–0.9)
MONOCYTES NFR BLD AUTO: 11.5 % (ref 5–12)
NEUTROPHILS NFR BLD AUTO: 3.15 10*3/MM3 (ref 1.7–7)
NEUTROPHILS NFR BLD AUTO: 58.7 % (ref 42.7–76)
NRBC BLD AUTO-RTO: 0 /100 WBC (ref 0–0.2)
PLATELET # BLD AUTO: 232 10*3/MM3 (ref 140–450)
PMV BLD AUTO: 11.2 FL (ref 6–12)
POTASSIUM SERPL-SCNC: 4 MMOL/L (ref 3.5–5.2)
PROTHROMBIN TIME: 9.8 SECONDS (ref 9.6–11.7)
RBC # BLD AUTO: 4.22 10*6/MM3 (ref 3.77–5.28)
SODIUM SERPL-SCNC: 140 MMOL/L (ref 136–145)
WBC NRBC COR # BLD: 5.37 10*3/MM3 (ref 3.4–10.8)

## 2022-08-29 PROCEDURE — 85025 COMPLETE CBC W/AUTO DIFF WBC: CPT

## 2022-08-29 PROCEDURE — 85730 THROMBOPLASTIN TIME PARTIAL: CPT

## 2022-08-29 PROCEDURE — 93010 ELECTROCARDIOGRAM REPORT: CPT | Performed by: INTERNAL MEDICINE

## 2022-08-29 PROCEDURE — 93005 ELECTROCARDIOGRAM TRACING: CPT | Performed by: ORTHOPAEDIC SURGERY

## 2022-08-29 PROCEDURE — 36415 COLL VENOUS BLD VENIPUNCTURE: CPT

## 2022-08-29 PROCEDURE — 80048 BASIC METABOLIC PNL TOTAL CA: CPT

## 2022-08-29 PROCEDURE — 85610 PROTHROMBIN TIME: CPT

## 2022-09-01 ENCOUNTER — ANESTHESIA EVENT (OUTPATIENT)
Dept: PERIOP | Facility: HOSPITAL | Age: 77
End: 2022-09-01

## 2022-09-01 LAB — QT INTERVAL: 426 MS

## 2022-09-01 RX ORDER — NAPROXEN 250 MG/1
250 TABLET ORAL 2 TIMES DAILY WITH MEALS
Qty: 28 TABLET | Refills: 0 | Status: SHIPPED | OUTPATIENT
Start: 2022-09-01 | End: 2022-10-13 | Stop reason: ALTCHOICE

## 2022-09-01 RX ORDER — PROMETHAZINE HYDROCHLORIDE 12.5 MG/1
12.5 TABLET ORAL EVERY 6 HOURS PRN
Qty: 21 TABLET | Refills: 1 | Status: SHIPPED | OUTPATIENT
Start: 2022-09-01 | End: 2022-10-26

## 2022-09-01 RX ORDER — HYDROCODONE BITARTRATE AND ACETAMINOPHEN 5; 325 MG/1; MG/1
1 TABLET ORAL EVERY 6 HOURS PRN
Qty: 20 TABLET | Refills: 0 | Status: SHIPPED | OUTPATIENT
Start: 2022-09-01 | End: 2022-10-26

## 2022-09-02 ENCOUNTER — ANESTHESIA (OUTPATIENT)
Dept: PERIOP | Facility: HOSPITAL | Age: 77
End: 2022-09-02

## 2022-09-02 ENCOUNTER — HOSPITAL ENCOUNTER (OUTPATIENT)
Facility: HOSPITAL | Age: 77
Setting detail: HOSPITAL OUTPATIENT SURGERY
Discharge: HOME | End: 2022-09-02
Attending: ORTHOPAEDIC SURGERY | Admitting: ORTHOPAEDIC SURGERY

## 2022-09-02 ENCOUNTER — TREATMENT (OUTPATIENT)
Dept: PHYSICAL THERAPY | Facility: CLINIC | Age: 77
End: 2022-09-02

## 2022-09-02 ENCOUNTER — TELEPHONE (OUTPATIENT)
Dept: PHYSICAL THERAPY | Facility: CLINIC | Age: 77
End: 2022-09-02

## 2022-09-02 VITALS
SYSTOLIC BLOOD PRESSURE: 108 MMHG | DIASTOLIC BLOOD PRESSURE: 60 MMHG | BODY MASS INDEX: 26.79 KG/M2 | OXYGEN SATURATION: 95 % | HEART RATE: 51 BPM | TEMPERATURE: 97.3 F | RESPIRATION RATE: 14 BRPM | WEIGHT: 160.8 LBS | HEIGHT: 65 IN

## 2022-09-02 DIAGNOSIS — M25.619 LIMITED RANGE OF MOTION (ROM) OF SHOULDER: ICD-10-CM

## 2022-09-02 DIAGNOSIS — M75.02 ADHESIVE CAPSULITIS OF LEFT SHOULDER: Primary | ICD-10-CM

## 2022-09-02 DIAGNOSIS — M25.512 ACUTE PAIN OF LEFT SHOULDER: ICD-10-CM

## 2022-09-02 PROCEDURE — 25010000002 ROPIVACAINE PER 1 MG: Performed by: ANESTHESIOLOGY

## 2022-09-02 PROCEDURE — 25010000002 DEXAMETHASONE PER 1 MG: Performed by: ANESTHESIOLOGY

## 2022-09-02 PROCEDURE — 25010000002 PROPOFOL 10 MG/ML EMULSION

## 2022-09-02 PROCEDURE — 97162 PT EVAL MOD COMPLEX 30 MIN: CPT | Performed by: PHYSICAL THERAPIST

## 2022-09-02 PROCEDURE — 25010000002 MIDAZOLAM PER 1 MG: Performed by: ANESTHESIOLOGY

## 2022-09-02 PROCEDURE — 25010000002 FENTANYL CITRATE (PF) 50 MCG/ML SOLUTION: Performed by: ANESTHESIOLOGY

## 2022-09-02 PROCEDURE — 97140 MANUAL THERAPY 1/> REGIONS: CPT | Performed by: PHYSICAL THERAPIST

## 2022-09-02 PROCEDURE — 76942 ECHO GUIDE FOR BIOPSY: CPT | Performed by: ORTHOPAEDIC SURGERY

## 2022-09-02 PROCEDURE — 23700 MNPJ ANES SHO JT FIXJ APRATS: CPT | Performed by: ORTHOPAEDIC SURGERY

## 2022-09-02 RX ORDER — FENTANYL CITRATE 50 UG/ML
50 INJECTION, SOLUTION INTRAMUSCULAR; INTRAVENOUS
Status: DISCONTINUED | OUTPATIENT
Start: 2022-09-02 | End: 2022-09-02 | Stop reason: HOSPADM

## 2022-09-02 RX ORDER — FLUMAZENIL 0.1 MG/ML
0.2 INJECTION INTRAVENOUS AS NEEDED
Status: DISCONTINUED | OUTPATIENT
Start: 2022-09-02 | End: 2022-09-02 | Stop reason: HOSPADM

## 2022-09-02 RX ORDER — NALOXONE HCL 0.4 MG/ML
0.4 VIAL (ML) INJECTION AS NEEDED
Status: DISCONTINUED | OUTPATIENT
Start: 2022-09-02 | End: 2022-09-02 | Stop reason: HOSPADM

## 2022-09-02 RX ORDER — DEXAMETHASONE SODIUM PHOSPHATE 4 MG/ML
INJECTION, SOLUTION INTRA-ARTICULAR; INTRALESIONAL; INTRAMUSCULAR; INTRAVENOUS; SOFT TISSUE
Status: DISCONTINUED | OUTPATIENT
Start: 2022-09-02 | End: 2022-09-02 | Stop reason: SURG

## 2022-09-02 RX ORDER — ROPIVACAINE HYDROCHLORIDE 5 MG/ML
INJECTION, SOLUTION EPIDURAL; INFILTRATION; PERINEURAL
Status: DISCONTINUED | OUTPATIENT
Start: 2022-09-02 | End: 2022-09-02 | Stop reason: SURG

## 2022-09-02 RX ORDER — MIDAZOLAM HYDROCHLORIDE 1 MG/ML
INJECTION INTRAMUSCULAR; INTRAVENOUS
Status: DISCONTINUED | OUTPATIENT
Start: 2022-09-02 | End: 2022-09-02 | Stop reason: SURG

## 2022-09-02 RX ORDER — PROMETHAZINE HYDROCHLORIDE 25 MG/1
25 SUPPOSITORY RECTAL ONCE AS NEEDED
Status: DISCONTINUED | OUTPATIENT
Start: 2022-09-02 | End: 2022-09-02 | Stop reason: HOSPADM

## 2022-09-02 RX ORDER — MIDAZOLAM HYDROCHLORIDE 1 MG/ML
0.5 INJECTION INTRAMUSCULAR; INTRAVENOUS
Status: DISCONTINUED | OUTPATIENT
Start: 2022-09-02 | End: 2022-09-02 | Stop reason: HOSPADM

## 2022-09-02 RX ORDER — SODIUM CHLORIDE 0.9 % (FLUSH) 0.9 %
10 SYRINGE (ML) INJECTION EVERY 12 HOURS SCHEDULED
Status: DISCONTINUED | OUTPATIENT
Start: 2022-09-02 | End: 2022-09-02 | Stop reason: HOSPADM

## 2022-09-02 RX ORDER — PROPOFOL 10 MG/ML
VIAL (ML) INTRAVENOUS AS NEEDED
Status: DISCONTINUED | OUTPATIENT
Start: 2022-09-02 | End: 2022-09-02 | Stop reason: SURG

## 2022-09-02 RX ORDER — ONDANSETRON 2 MG/ML
4 INJECTION INTRAMUSCULAR; INTRAVENOUS ONCE AS NEEDED
Status: DISCONTINUED | OUTPATIENT
Start: 2022-09-02 | End: 2022-09-02 | Stop reason: HOSPADM

## 2022-09-02 RX ORDER — FENTANYL CITRATE 50 UG/ML
INJECTION, SOLUTION INTRAMUSCULAR; INTRAVENOUS
Status: DISCONTINUED | OUTPATIENT
Start: 2022-09-02 | End: 2022-09-02 | Stop reason: SURG

## 2022-09-02 RX ORDER — PROMETHAZINE HYDROCHLORIDE 25 MG/1
25 TABLET ORAL ONCE AS NEEDED
Status: DISCONTINUED | OUTPATIENT
Start: 2022-09-02 | End: 2022-09-02 | Stop reason: HOSPADM

## 2022-09-02 RX ORDER — SODIUM CHLORIDE 0.9 % (FLUSH) 0.9 %
10 SYRINGE (ML) INJECTION AS NEEDED
Status: DISCONTINUED | OUTPATIENT
Start: 2022-09-02 | End: 2022-09-02 | Stop reason: HOSPADM

## 2022-09-02 RX ORDER — SODIUM CHLORIDE, SODIUM LACTATE, POTASSIUM CHLORIDE, CALCIUM CHLORIDE 600; 310; 30; 20 MG/100ML; MG/100ML; MG/100ML; MG/100ML
9 INJECTION, SOLUTION INTRAVENOUS CONTINUOUS PRN
Status: DISCONTINUED | OUTPATIENT
Start: 2022-09-02 | End: 2022-09-02

## 2022-09-02 RX ORDER — SODIUM CHLORIDE 9 MG/ML
10 INJECTION, SOLUTION INTRAVENOUS ONCE
Status: COMPLETED | OUTPATIENT
Start: 2022-09-02 | End: 2022-09-02

## 2022-09-02 RX ADMIN — ROPIVACAINE HYDROCHLORIDE 30 ML: 5 INJECTION EPIDURAL; INFILTRATION; PERINEURAL at 07:15

## 2022-09-02 RX ADMIN — PROPOFOL 100 MG: 10 INJECTION, EMULSION INTRAVENOUS at 07:39

## 2022-09-02 RX ADMIN — FENTANYL CITRATE 100 MCG: 50 INJECTION, SOLUTION INTRAMUSCULAR; INTRAVENOUS at 07:15

## 2022-09-02 RX ADMIN — MIDAZOLAM 2 MG: 1 INJECTION INTRAMUSCULAR; INTRAVENOUS at 07:15

## 2022-09-02 RX ADMIN — DEXAMETHASONE SODIUM PHOSPHATE 4 MG: 4 INJECTION, SOLUTION INTRAMUSCULAR; INTRAVENOUS at 07:15

## 2022-09-02 RX ADMIN — SODIUM CHLORIDE: 0.9 INJECTION, SOLUTION INTRAVENOUS at 07:31

## 2022-09-02 NOTE — TELEPHONE ENCOUNTER
PROVIDER OFFICE CALLED AND SAID THAT THIS PATIENT NEEDED TO BE SCHEDULED SAME DAY AS SURGERY WHICH IS TODAY. I ADVISED I WOULD PUT THE PATIENT IN AT 12:30 TODAY THAT OUR OFFICE DID NOT SCHEDULE THE PATIENT AND I WAS SORRY.

## 2022-09-02 NOTE — PROGRESS NOTES
"Physical Therapy Initial Evaluation and Plan of Care    Patient: Akosua Munguia   : 1945  Diagnosis/ICD-10 Code:  Adhesive capsulitis of left shoulder [M75.02]  Referring practitioner: Reyes Michaels, *  Date of Initial Visit: 2022  Today's Date: 2022  Patient seen for 1 sessions           Subjective Questionnaire: PT Functional Test: Quick DASH 32%      Subjective Evaluation    History of Present Illness  Mechanism of injury: Patient is a 76 year old female s/p L shoulder manipulation due to adhesive capsulitis. Her shoulder pain began ~2-3 months ago with gradual onset and then gradually worsened.  She had an x-ray which was negative for fracture followed by an MRI which indicated adhesive capsulitis.  She did not receive any PT for her shoulder prior to her manipulation. Prior to the manipulation, her pain was initially only with movement then progressed to constant pain that worsened with movement.  She has difficulty sleeping due to the pain.  Pain worsens with reaching overhead, reaching behind her back, putting on seatbelt, upper body dressing.  Pain improved with rest.  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      Pain  Current pain ratin  At worst pain ratin  Location: L shoulder   Quality: dull ache and sharp    Social Support  Lives with: spouse    Hand dominance: right    Patient Goals  Patient goals for therapy: decreased pain, increased motion, increased strength and independence with ADLs/IADLs  Patient goal: \"move my arm in a regular motion\"           Objective          Observations     Additional Shoulder Observation Details  L arm in sling due to nerve block    Active Range of Motion     Right Shoulder   Flexion: WFL  Abduction: WFL  External rotation BTH: T1   Internal rotation BTB: T11     Additional Active Range of Motion Details  L shoulder AROM not assessed due to nerve block     Passive Range of Motion   Left Shoulder "   Flexion: 162 degrees   Abduction: 135 degrees   External rotation 45°: 30 degrees   Internal rotation 45°: 60 degrees     Right Shoulder   Normal passive range of motion    Strength/Myotome Testing     Right Shoulder     Planes of Motion   Flexion: 4+   Abduction: 4+   External rotation at 0°: 4+   Internal rotation at 0°: 5     Right Elbow   Flexion: 5  Extension: 4+    Additional Strength Details  L shoulder not assessed per nerve block          Assessment & Plan     Assessment  Impairments: abnormal muscle firing, abnormal or restricted ROM, activity intolerance, impaired physical strength, lacks appropriate home exercise program and pain with function  Functional Limitations: carrying objects, lifting, sleeping, pushing, uncomfortable because of pain, reaching behind back, reaching overhead and unable to perform repetitive tasks  Assessment details: Patient is a 76 year old female s/p L shoulder manipulation on 9/2.  Patient presents with limited L shoulder ROM, decreased L shoulder strength, difficulty with ADLs including upper body dressing, styling hair, household chores, and working on her farm.  Patient presents with the impairments listed above and based on the objective findings and the physical therapy evaluation, the patient's condition has the potential to improve in response to therapy.   The patient's condition and/or services required are at a level of complexity that necessitates the skill & supervision of a physical therapist.    Prognosis: good    Goals  Plan Goals: STG:  -Patient will demonstrate increased L shoulder flexion and abduction AROM to 90 degrees for improved ability to perform reaching activities in 3 weeks.  -Patient will demonstrate increased L shoulder IR AROM to 45 degrees for improved ability to reach behind her back in 4 weeks.  -Patient will be independent with initial HEP in 2 weeks.    LTG:  -Patient will demonstrate increased L shoulder flexion and abduction to WFL for  improved ability to reach overhead in 12 weeks.  -Patient will demonstrate increased strength of L shoulder to grossly 4+/5 in 12 weeks.  -Patient will demonstrate increased L shoulder functional IR T12 and ER to C6 in 12 weeks.  -Patient will report min to no L shoulder pain for improved sleep pattern in 12 weeks.    Plan  Therapy options: will be seen for skilled therapy services  Planned modality interventions: cryotherapy, thermotherapy (hydrocollator packs), electrical stimulation/Cambodian stimulation, ultrasound, dry needling and high voltage pulsed current (pain management)  Planned therapy interventions: manual therapy, postural training, soft tissue mobilization, spinal/joint mobilization, strengthening, stretching, therapeutic activities, joint mobilization, home exercise program, functional ROM exercises, flexibility, body mechanics training and neuromuscular re-education  Frequency: 2x week  Duration in weeks: 12  Treatment plan discussed with: patient        History # of Personal Factors and/or Comorbidities: MODERATE (1-2)  Examination of Body System(s): # of elements: MODERATE (3)  Clinical Presentation: EVOLVING  Clinical Decision Making: MODERATE    Timed:         Manual Therapy:    15     mins  05011;     Therapeutic Exercise:    5     mins  36630;     Neuromuscular Leo:        mins  44164;    Therapeutic Activity:          mins  07125;     Gait Training:           mins  78771;     Ultrasound:          mins  63897;    Ionto                                   mins   48169    Un-Timed:  Electrical Stimulation:         mins  34991 ( );  Dry Needling          mins 20453; 20561  Traction          mins 34959  Low Eval          Mins  97996  Mod Eval     25     Mins  50829  High Eval                            Mins  43712      Timed Treatment:   20   mins   Total Treatment:     45   mins    PT SIGNATURE: Teetee Carey PT   [place] License # [id number]  Physical Therapist  Electronically signed by Teetee ARANDA  MARK Carey, 09/02/22, 12:34 PM EDT      Initial Certification  Certification Period: 9/2/2022 thru 11/30/2022  I certify that the therapy services are furnished while this patient is under my care.  The services outlined above are required by this patient, and will be reviewed every 90 days.     PHYSICIAN: Reyes Michaels MD _____________________________________________________________________________________      DATE: __________________________________________    Please sign and return via fax to [phone]. Thank you, [facility].

## 2022-09-02 NOTE — ANESTHESIA PROCEDURE NOTES
Peripheral Block      Patient location during procedure: pre-op  Stop time: 9/2/2022 7:15 AM  Reason for block: at surgeon's request and post-op pain management  Performed by  Anesthesiologist: Trey Gamez MD  Preanesthetic Checklist  Completed: patient identified, IV checked, site marked, risks and benefits discussed, surgical consent, monitors and equipment checked, pre-op evaluation and timeout performed  Prep:  Pt Position: supine  Sterile barriers:cap, gloves, sterile barriers, mask, partial drape and washed/disinfected hands  Prep: ChloraPrep  Patient monitoring: blood pressure monitoring, continuous pulse oximetry and EKG  Procedure    Sedation: yes  Performed under: local infiltration  Guidance:ultrasound guided    ULTRASOUND INTERPRETATION.  Using ultrasound guidance a 20 G gauge needle was placed in close proximity to the brachial plexus nerve, at which point, under ultrasound guidance anesthetic was injected in the area of the nerve and spread of the anesthesia was seen on ultrasound in close proximity thereto.  There were no abnormalities seen on ultrasound; a digital image was taken; and the patient tolerated the procedure with no complications. Images:still images obtained, printed/placed on chart    Laterality:left  Block Type:interscalene  Injection Technique:single-shot  Needle Type:echogenic  Needle Gauge:20 G  Resistance on Injection: none  Sedation medications used: midazolam (VERSED) injection, 2 mg  fentaNYL citrate (PF) (SUBLIMAZE) injection, 100 mcg  Medications Used: dexamethasone (DECADRON) injection, 4 mg  ropivacaine (NAROPIN) 0.5 % injection, 30 mL  Med administered at 9/2/2022 7:15 AM      Post Assessment  Injection Assessment: negative aspiration for heme, no paresthesia on injection and incremental injection  Patient Tolerance:comfortable throughout block  Complications:no  Additional Notes  PT SEDATED YET AWAKE WITH MEANINGFUL CONVERSATION THROUGHOUT NO PAIN OR PARESTHESIA WITH  NEEDLE PLACEMENT/INJECTION. US VERIFICATION NEEDLE LOCATION/MEDICATION DISBURSEMENT. US GUIDED X1 WITH EASE

## 2022-09-02 NOTE — OP NOTE
SHOULDER MANIPULATION  Procedure Report    Patient Name:  Akosua Munguia  YOB: 1945    Date of Surgery:  9/2/2022     Indications: This is a 76 y.o. female with stiffness to the left shoulder.  Work-up demonstrated adhesive capsulitis. Treatment options were discussed.  They desired to proceed with shoulder manipulation after discussing the risk of fracture, soft tissue damage, and recurrence      Pre-op Diagnosis:   Adhesive capsulitis of left shoulder [M75.02]       Post-op Diagnosis:    Post-Op Diagnosis Codes:     * Adhesive capsulitis of left shoulder [M75.02]    Procedure/CPT® Codes: 29396    Procedure(s):  SHOULDER MANIPULATION      Anesthesia: General with Block    IVF: See anesthesia record    Estimated Blood Loss: none    Implants:    None        Complications: None    Specimens:none    Description of Procedure: The patient's operative site was marked. Patient was brought to the operating room and placed on the operating room table.  General anesthesia was administered.  A timeout was taken to confirm the correct operative site and procedure.  Manipulation resulted in rupture of capsular adhesions in all directions obtaining full range of motion with no apparent complication They were taken to recovery room in satisfactory condition in a sling. No complications, good capillary refill to the hand.  I was present for all portions.

## 2022-09-02 NOTE — ANESTHESIA POSTPROCEDURE EVALUATION
Patient: Akosua Munguia    Procedure Summary     Date: 09/02/22 Room / Location: [facility] OR  / [facility] MAIN OR    Anesthesia Start: 0731 Anesthesia Stop: 0750    Procedure: SHOULDER MANIPULATION (Left Shoulder) Diagnosis:       Adhesive capsulitis of left shoulder      (Adhesive capsulitis of left shoulder [M75.02])    Surgeons: Reyes Michaels MD Provider: Trey Gamez MD    Anesthesia Type: general, general with block ASA Status: 3          Anesthesia Type: general, general with block    Vitals  Vitals Value Taken Time   /60 09/02/22 0816   Temp 97.3 °F (36.3 °C) 09/02/22 0756   Pulse 51 09/02/22 0816   Resp 14 09/02/22 0816   SpO2 95 % 09/02/22 0816           Post Anesthesia Care and Evaluation    Patient location during evaluation: PACU  Patient participation: complete - patient participated  Level of consciousness: awake  Pain scale: See nurse's notes for pain score.  Pain management: adequate    Airway patency: patent  Anesthetic complications: No anesthetic complications  PONV Status: none  Cardiovascular status: acceptable  Respiratory status: acceptable  Hydration status: acceptable    Comments: Patient seen and examined postoperatively; vital signs stable; SpO2 greater than or equal to 90%; cardiopulmonary status stable; nausea/vomiting adequately controlled; pain adequately controlled; no apparent anesthesia complications; patient discharged from anesthesia care when discharge criteria were met

## 2022-09-06 ENCOUNTER — TREATMENT (OUTPATIENT)
Dept: PHYSICAL THERAPY | Facility: CLINIC | Age: 77
End: 2022-09-06

## 2022-09-06 DIAGNOSIS — M75.02 ADHESIVE CAPSULITIS OF LEFT SHOULDER: Primary | ICD-10-CM

## 2022-09-06 DIAGNOSIS — M25.619 LIMITED RANGE OF MOTION (ROM) OF SHOULDER: ICD-10-CM

## 2022-09-06 DIAGNOSIS — M25.512 ACUTE PAIN OF LEFT SHOULDER: ICD-10-CM

## 2022-09-06 PROCEDURE — 97140 MANUAL THERAPY 1/> REGIONS: CPT | Performed by: PHYSICAL THERAPIST

## 2022-09-06 PROCEDURE — 97110 THERAPEUTIC EXERCISES: CPT | Performed by: PHYSICAL THERAPIST

## 2022-09-06 PROCEDURE — G0283 ELEC STIM OTHER THAN WOUND: HCPCS | Performed by: PHYSICAL THERAPIST

## 2022-09-06 NOTE — PROGRESS NOTES
Physical Therapy Daily Progress Note      Patient: Akosua Munguia   : 1945  Diagnosis/ICD-10 Code:  Adhesive capsulitis of left shoulder [M75.02]   Problems Addressed this Visit        Musculoskeletal and Injuries    Adhesive capsulitis of left shoulder - Primary      Other Visit Diagnoses     Limited range of motion (ROM) of shoulder        Acute pain of left shoulder          Diagnoses       Codes Comments    Adhesive capsulitis of left shoulder    -  Primary ICD-10-CM: M75.02  ICD-9-CM: 726.0     Limited range of motion (ROM) of shoulder     ICD-10-CM: M25.619  ICD-9-CM: 719.51     Acute pain of left shoulder     ICD-10-CM: M25.512  ICD-9-CM: 719.41          Referring practitioner: Reyes Michaels, *  Date of Initial Visit: Type: THERAPY  Noted: 2022  Today's Date: 2022    VISIT#: 2    Subjective Patient reports her shoulder felt numb the day following her manipulation but then was very sore the next two days.  Patient states it is sore today but it tolerable.      Objective     See Exercise, Manual, and Modality Logs for complete treatment.     Assessment/Plan Initiated active ROM and strengthening exercises this date with patient reporting mild discomfort at L shoulder globally with shoulder pulleys and wall walks.  She noted discomfort with PROM with overpressure most notably with flexion and abduction.  Patient encouraged to continue icing once daily for a few more days to minimize inflammation in the shoulder joint from the manipulation.  Estim used at end of session with decreased soreness reported after.    Progress per Plan of Care and Progress strengthening /stabilization /functional activity         Timed:         Manual Therapy:    15     mins  78049;     Therapeutic Exercise:    18     mins  50684;     Neuromuscular Leo:        mins  43161;    Therapeutic Activity:          mins  31592;     Gait Training:           mins  81475;     Ultrasound:          mins  87839;     Ionto                                   mins   98473    Un-Timed:  Electrical Stimulation:    15     mins  92383 ( );  Dry Needling          mins self-pay 20560; 20561  Traction          mins 24469      Timed Treatment:   33   mins   Total Treatment:     48   mins    Teetee Carey PT  [place] License # [id number]  Physical Therapist

## 2022-09-07 ENCOUNTER — TREATMENT (OUTPATIENT)
Dept: PHYSICAL THERAPY | Facility: CLINIC | Age: 77
End: 2022-09-07

## 2022-09-07 DIAGNOSIS — M25.619 LIMITED RANGE OF MOTION (ROM) OF SHOULDER: ICD-10-CM

## 2022-09-07 DIAGNOSIS — M25.512 ACUTE PAIN OF LEFT SHOULDER: ICD-10-CM

## 2022-09-07 DIAGNOSIS — M75.02 ADHESIVE CAPSULITIS OF LEFT SHOULDER: Primary | ICD-10-CM

## 2022-09-07 PROCEDURE — 97110 THERAPEUTIC EXERCISES: CPT | Performed by: PHYSICAL THERAPIST

## 2022-09-07 PROCEDURE — 97140 MANUAL THERAPY 1/> REGIONS: CPT | Performed by: PHYSICAL THERAPIST

## 2022-09-07 PROCEDURE — G0283 ELEC STIM OTHER THAN WOUND: HCPCS | Performed by: PHYSICAL THERAPIST

## 2022-09-07 NOTE — PROGRESS NOTES
Physical Therapy Daily Progress Note      Patient: Akosua Munguia   : 1945  Diagnosis/ICD-10 Code:  Adhesive capsulitis of left shoulder [M75.02]   Problems Addressed this Visit        Musculoskeletal and Injuries    Adhesive capsulitis of left shoulder - Primary      Other Visit Diagnoses     Limited range of motion (ROM) of shoulder        Acute pain of left shoulder          Diagnoses       Codes Comments    Adhesive capsulitis of left shoulder    -  Primary ICD-10-CM: M75.02  ICD-9-CM: 726.0     Limited range of motion (ROM) of shoulder     ICD-10-CM: M25.619  ICD-9-CM: 719.51     Acute pain of left shoulder     ICD-10-CM: M25.512  ICD-9-CM: 719.41         Referring practitioner: Reyes Michaels, *  Date of Initial Visit: Type: THERAPY  Noted: 2022  Today's Date: 2022    VISIT#: 3    Subjective : Pt reports her shoulder is sore today. States she can tell a significant difference in ROM since manipulation. Can get dressed easier and fasten her bra. Able to dry her hair better. Cannot lay on L side at night to sleep.      Objective     See Exercise, Manual, and Modality Logs for complete treatment.     Assessment/Plan : Continued active ROM and strengthening exercises this date with patient reporting mild discomfort at L shoulder at end ranges.  She noted discomfort with PROM with overpressure most notably with flexion and abduction.  Patient encouraged to continue icing once daily for a few more days to minimize inflammation in the shoulder joint from the manipulation.  Estim used at end of session with decreased soreness reported after.    Progress per Plan of Care and Progress strengthening /stabilization /functional activity         Timed:         Manual Therapy:    15     mins  15743;     Therapeutic Exercise:    15     mins  61706;     Neuromuscular Leo:        mins  37870;    Therapeutic Activity:          mins  78471;     Gait Training:           mins  73111;     Ultrasound:           mins  34394;    Ionto                                   mins   36615  Self Care                            mins   52993    Un-Timed:  Electrical Stimulation:    15     mins  18648 ( );  Dry Needling          mins 63553/49538  Traction          mins 10193  Canalith Repos                   mins  54423  Low Eval          Mins  30460  Mod Eval          Mins  40384  High Eval                            Mins  05991  Re-Eval                               mins  96317    Timed Treatment:   30   mins   Total Treatment:     45   mins    Mylene Booth, PT, CLT, Cert DN  Physical Therapist  [place] Lic # [id number]

## 2022-09-08 ENCOUNTER — TREATMENT (OUTPATIENT)
Dept: PHYSICAL THERAPY | Facility: CLINIC | Age: 77
End: 2022-09-08

## 2022-09-08 DIAGNOSIS — M25.619 LIMITED RANGE OF MOTION (ROM) OF SHOULDER: ICD-10-CM

## 2022-09-08 DIAGNOSIS — M25.512 ACUTE PAIN OF LEFT SHOULDER: ICD-10-CM

## 2022-09-08 DIAGNOSIS — M75.02 ADHESIVE CAPSULITIS OF LEFT SHOULDER: Primary | ICD-10-CM

## 2022-09-08 PROCEDURE — 97110 THERAPEUTIC EXERCISES: CPT | Performed by: PHYSICAL THERAPIST

## 2022-09-08 PROCEDURE — G0283 ELEC STIM OTHER THAN WOUND: HCPCS | Performed by: PHYSICAL THERAPIST

## 2022-09-08 PROCEDURE — 97140 MANUAL THERAPY 1/> REGIONS: CPT | Performed by: PHYSICAL THERAPIST

## 2022-09-08 NOTE — PROGRESS NOTES
Physical Therapy Daily Progress Note      Patient: Akosua Munguia   : 1945  Diagnosis/ICD-10 Code:  Adhesive capsulitis of left shoulder [M75.02]  Referring practitioner: Reyes Michaels, *  Date of Initial Visit: Type: THERAPY  Noted: 2022  Today's Date: 2022  Patient seen for 4 sessions         Akosua Munguia reports: she is having some soreness in to the upper arm and shoulder but states she does notices some improvement using only tylenol at this time to control pain. Pt. States she can now don and doff clothing but does find some limitation at times.     Objective   See Exercise, Manual, and Modality Logs for complete treatment.     Assessment/Plan   Pt. Tolerated treatment well is demonstrating PROM WFL at this point with fernando tenderness noted into ER still. Pt.. is approaching Full ROM into flexion this date with only tightness noted at end range. Pt. Will continue to benefit from passive stretching and gentle exercise geared toward ROM for return to PLOF at this time.    Goals  Plan Goals: STG:  -Patient will demonstrate increased L shoulder flexion and abduction AROM to 90 degrees for improved ability to perform reaching activities in 3 weeks.  -Patient will demonstrate increased L shoulder IR AROM to 45 degrees for improved ability to reach behind her back in 4 weeks.  -Patient will be independent with initial HEP in 2 weeks.    LTG:  -Patient will demonstrate increased L shoulder flexion and abduction to WFL for improved ability to reach overhead in 12 weeks.  -Patient will demonstrate increased strength of L shoulder to grossly 4+/5 in 12 weeks.  -Patient will demonstrate increased L shoulder functional IR T12 and ER to C6 in 12 weeks.  -Patient will report min to no L shoulder pain for improved sleep pattern in 12 weeks.      Progress strengthening /stabilization /functional activity           Timed:         Manual Therapy:    15     mins  67555;     Therapeutic Exercise:    15      mins  34221;       Un-Timed:  Electrical Stimulation:    15     mins  05430 ( );      Timed Treatment:   30   mins   Total Treatment:     45   mins    Jodee Vasquez PTA  Physical Therapist Assistant License #[id number]

## 2022-09-13 ENCOUNTER — TREATMENT (OUTPATIENT)
Dept: PHYSICAL THERAPY | Facility: CLINIC | Age: 77
End: 2022-09-13

## 2022-09-13 DIAGNOSIS — M25.512 ACUTE PAIN OF LEFT SHOULDER: ICD-10-CM

## 2022-09-13 DIAGNOSIS — M75.02 ADHESIVE CAPSULITIS OF LEFT SHOULDER: Primary | ICD-10-CM

## 2022-09-13 DIAGNOSIS — M25.619 LIMITED RANGE OF MOTION (ROM) OF SHOULDER: ICD-10-CM

## 2022-09-13 PROCEDURE — 97110 THERAPEUTIC EXERCISES: CPT | Performed by: PHYSICAL THERAPIST

## 2022-09-13 PROCEDURE — 97140 MANUAL THERAPY 1/> REGIONS: CPT | Performed by: PHYSICAL THERAPIST

## 2022-09-13 PROCEDURE — G0283 ELEC STIM OTHER THAN WOUND: HCPCS | Performed by: PHYSICAL THERAPIST

## 2022-09-13 NOTE — PROGRESS NOTES
Physical Therapy Daily Progress Note      Patient: Akosua Munguia   : 1945  Diagnosis/ICD-10 Code:  Adhesive capsulitis of left shoulder [M75.02]   Problems Addressed this Visit        Musculoskeletal and Injuries    Adhesive capsulitis of left shoulder - Primary      Other Visit Diagnoses     Limited range of motion (ROM) of shoulder        Acute pain of left shoulder          Diagnoses       Codes Comments    Adhesive capsulitis of left shoulder    -  Primary ICD-10-CM: M75.02  ICD-9-CM: 726.0     Limited range of motion (ROM) of shoulder     ICD-10-CM: M25.619  ICD-9-CM: 719.51     Acute pain of left shoulder     ICD-10-CM: M25.512  ICD-9-CM: 719.41          Referring practitioner: Reyes Michaels, *  Date of Initial Visit: Type: THERAPY  Noted: 2022  Today's Date: 2022    VISIT#: 5    Subjective Patient reports her L shoulder has been sore the past several days but overall is improving.  She received her shoulder pulleys last week and has been using them daily.      Objective     See Exercise, Manual, and Modality Logs for complete treatment.     Assessment/Plan Patient with improving L shoulder ROM with overall increased movement noted in all planes.  Tightness noted with flexion and abduction PROM with overpressure but a stretch reported versus pain.  She does continue to report pain at L shoulder with internal and external rotation but is able to tolerate increased stretch this date but firm end feel noted.  Continue to progress ROM and strengthening as tolerated for improved ability to complete daily activities.    Progress per Plan of Care and Progress strengthening /stabilization /functional activity         Timed:         Manual Therapy:    15     mins  92509;     Therapeutic Exercise:    20     mins  24721;     Neuromuscular Leo:        mins  44111;    Therapeutic Activity:          mins  94905;     Gait Training:           mins  13381;     Ultrasound:          mins  25147;     Ionto                                   mins   44035    Un-Timed:  Electrical Stimulation:    15     mins  28394 ( );  Dry Needling          mins self-pay 20560; 20561  Traction          mins 68624      Timed Treatment:   35   mins   Total Treatment:     50   mins    Teetee Carey PT  [place] License # [id number]  Physical Therapist

## 2022-09-15 ENCOUNTER — OFFICE VISIT (OUTPATIENT)
Dept: ORTHOPEDIC SURGERY | Facility: CLINIC | Age: 77
End: 2022-09-15

## 2022-09-15 VITALS — BODY MASS INDEX: 26.66 KG/M2 | OXYGEN SATURATION: 99 % | HEART RATE: 61 BPM | WEIGHT: 160 LBS | HEIGHT: 65 IN

## 2022-09-15 DIAGNOSIS — M75.02 ADHESIVE CAPSULITIS OF LEFT SHOULDER: Primary | ICD-10-CM

## 2022-09-15 PROCEDURE — 99212 OFFICE O/P EST SF 10 MIN: CPT | Performed by: ORTHOPAEDIC SURGERY

## 2022-09-15 NOTE — PROGRESS NOTES
"     Patient ID: Akosua Munguia is a 76 y.o. female.  Left shoulder pain  Shoulder manipulation September 2 doing well in therapy    Review of Systems:        Objective:    Pulse 61   Ht 165.1 cm (65\")   Wt 72.6 kg (160 lb)   LMP 01/01/1986   SpO2 99%   BMI 26.63 kg/m²     Physical Examination:     Left shoulder no tenderness passive elevation 160 abduction 130 external rotation 40 with intact cuff    Imaging:       Assessment:    Doing well after shoulder manipulation    Plan:   Continue therapy okay for progressive activity at home see me in a month      Procedures          Disclaimer: Part of this note may be an electronic transcription/translation of spoken language to printed text using the Dragon Dictation System  "

## 2022-09-16 ENCOUNTER — TREATMENT (OUTPATIENT)
Dept: PHYSICAL THERAPY | Facility: CLINIC | Age: 77
End: 2022-09-16

## 2022-09-16 DIAGNOSIS — M75.02 ADHESIVE CAPSULITIS OF LEFT SHOULDER: Primary | ICD-10-CM

## 2022-09-16 DIAGNOSIS — M25.619 LIMITED RANGE OF MOTION (ROM) OF SHOULDER: ICD-10-CM

## 2022-09-16 DIAGNOSIS — M25.512 ACUTE PAIN OF LEFT SHOULDER: ICD-10-CM

## 2022-09-16 PROCEDURE — 97140 MANUAL THERAPY 1/> REGIONS: CPT | Performed by: PHYSICAL THERAPIST

## 2022-09-16 PROCEDURE — 97110 THERAPEUTIC EXERCISES: CPT | Performed by: PHYSICAL THERAPIST

## 2022-09-16 PROCEDURE — G0283 ELEC STIM OTHER THAN WOUND: HCPCS | Performed by: PHYSICAL THERAPIST

## 2022-09-16 NOTE — PROGRESS NOTES
Physical Therapy Daily Progress Note      Patient: Akosua Munguia   : 1945  Diagnosis/ICD-10 Code:  Adhesive capsulitis of left shoulder [M75.02]   Problems Addressed this Visit        Musculoskeletal and Injuries    Adhesive capsulitis of left shoulder - Primary      Other Visit Diagnoses     Limited range of motion (ROM) of shoulder        Acute pain of left shoulder          Diagnoses       Codes Comments    Adhesive capsulitis of left shoulder    -  Primary ICD-10-CM: M75.02  ICD-9-CM: 726.0     Limited range of motion (ROM) of shoulder     ICD-10-CM: M25.619  ICD-9-CM: 719.51     Acute pain of left shoulder     ICD-10-CM: M25.512  ICD-9-CM: 719.41          Referring practitioner: Reyes Michaels, *  Date of Initial Visit: Type: THERAPY  Noted: 2022  Today's Date: 2022    VISIT#: 6    Subjective Patient reports her shoulder has been since waking yesterday morning.  She had a follow up with MD and states ROM and strength is progressing well.      Objective     See Exercise, Manual, and Modality Logs for complete treatment.     Assessment/Plan Progressed PROM this date with increased stretch tolerated during manual therapy. Patient demonstrates improving flexion, abduction, IR, and ER with ~75% of full ROM noted.  Continue to progress strengthening and ROM as tolerated.    Progress per Plan of Care and Progress strengthening /stabilization /functional activity         Timed:         Manual Therapy:    15     mins  49514;     Therapeutic Exercise:    14     mins  51259;     Neuromuscular Leo:        mins  09734;    Therapeutic Activity:          mins  86738;     Gait Training:           mins  08413;     Ultrasound:          mins  80432;    Ionto                                   mins   48620    Un-Timed:  Electrical Stimulation:    15     mins  11665 ( );  Dry Needling          mins self-pay ;   Traction          mins 57588      Timed Treatment:   29   mins   Total  Treatment:     44   mins    Teetee Carey, PT  [place] License # [id number]  Physical Therapist

## 2022-09-19 ENCOUNTER — TREATMENT (OUTPATIENT)
Dept: PHYSICAL THERAPY | Facility: CLINIC | Age: 77
End: 2022-09-19

## 2022-09-19 DIAGNOSIS — M54.2 PAIN, NECK: ICD-10-CM

## 2022-09-19 DIAGNOSIS — M75.02 ADHESIVE CAPSULITIS OF LEFT SHOULDER: Primary | ICD-10-CM

## 2022-09-19 DIAGNOSIS — M25.512 ACUTE PAIN OF LEFT SHOULDER: ICD-10-CM

## 2022-09-19 DIAGNOSIS — M25.619 LIMITED RANGE OF MOTION (ROM) OF SHOULDER: ICD-10-CM

## 2022-09-19 DIAGNOSIS — M54.12 RADICULOPATHY, CERVICAL: ICD-10-CM

## 2022-09-19 PROCEDURE — 97110 THERAPEUTIC EXERCISES: CPT | Performed by: PHYSICAL THERAPIST

## 2022-09-19 PROCEDURE — G0283 ELEC STIM OTHER THAN WOUND: HCPCS | Performed by: PHYSICAL THERAPIST

## 2022-09-19 PROCEDURE — 97140 MANUAL THERAPY 1/> REGIONS: CPT | Performed by: PHYSICAL THERAPIST

## 2022-09-19 NOTE — PROGRESS NOTES
Physical Therapy Daily Progress Note      Patient: Akosua Munguia   : 1945  Diagnosis/ICD-10 Code:  Adhesive capsulitis of left shoulder [M75.02]  Referring practitioner: Reyes Michaels, *  Date of Initial Visit: Type: THERAPY  Noted: 2022  Today's Date: 2022  Patient seen for 7 sessions         Akosua Munguia reports: her pain still persist but she has been instructed by MD that it may take a few more weeks for the pain to improve. Pt. States she is now having soreness in her R UE as well.    Objective   See Exercise, Manual, and Modality Logs for complete treatment.     Assessment/Plan  Pt. Tolerates treatment well today with notable trigger points in B UT's with manual STM. PROM is progressing very well but constant presence of pain is current primary of concern and mobs were provided as well as STM's to relief soreness and discomfort. Pt. Had noted tenderness to palpation of Lateral scapular border. Pt. Was encouraged to maintain stretch and pain relief HEP and continue to monitor aggravating factors.    Goals  Plan Goals: STG:  -Patient will demonstrate increased L shoulder flexion and abduction AROM to 90 degrees for improved ability to perform reaching activities in 3 weeks.  -Patient will demonstrate increased L shoulder IR AROM to 45 degrees for improved ability to reach behind her back in 4 weeks.  -Patient will be independent with initial HEP in 2 weeks.    LTG:  -Patient will demonstrate increased L shoulder flexion and abduction to WFL for improved ability to reach overhead in 12 weeks.  -Patient will demonstrate increased strength of L shoulder to grossly 4+/5 in 12 weeks.  -Patient will demonstrate increased L shoulder functional IR T12 and ER to C6 in 12 weeks.  -Patient will report min to no L shoulder pain for improved sleep pattern in 12 weeks.    Progress strengthening /stabilization /functional activity           Timed:         Manual Therapy:    20     mins  48005;      Therapeutic Exercise:    15     mins  13836;       Un-Timed:  Electrical Stimulation:    15     mins  22309 ( );      Timed Treatment:   35   mins   Total Treatment:     50   mins    Jodee Vasquez PTA  Physical Therapist Assistant License #[id number]

## 2022-09-23 ENCOUNTER — TREATMENT (OUTPATIENT)
Dept: PHYSICAL THERAPY | Facility: CLINIC | Age: 77
End: 2022-09-23

## 2022-09-23 DIAGNOSIS — M75.02 ADHESIVE CAPSULITIS OF LEFT SHOULDER: Primary | ICD-10-CM

## 2022-09-23 DIAGNOSIS — M25.619 LIMITED RANGE OF MOTION (ROM) OF SHOULDER: ICD-10-CM

## 2022-09-23 DIAGNOSIS — M25.512 ACUTE PAIN OF LEFT SHOULDER: ICD-10-CM

## 2022-09-23 PROCEDURE — 97140 MANUAL THERAPY 1/> REGIONS: CPT | Performed by: PHYSICAL THERAPIST

## 2022-09-23 PROCEDURE — 97110 THERAPEUTIC EXERCISES: CPT | Performed by: PHYSICAL THERAPIST

## 2022-09-23 PROCEDURE — G0283 ELEC STIM OTHER THAN WOUND: HCPCS | Performed by: PHYSICAL THERAPIST

## 2022-09-23 NOTE — PROGRESS NOTES
Physical Therapy Daily Progress Note      Patient: Akosua Munguia   : 1945  Diagnosis/ICD-10 Code:  Adhesive capsulitis of left shoulder [M75.02]   Problems Addressed this Visit        Musculoskeletal and Injuries    Adhesive capsulitis of left shoulder - Primary      Other Visit Diagnoses     Limited range of motion (ROM) of shoulder        Acute pain of left shoulder          Diagnoses       Codes Comments    Adhesive capsulitis of left shoulder    -  Primary ICD-10-CM: M75.02  ICD-9-CM: 726.0     Limited range of motion (ROM) of shoulder     ICD-10-CM: M25.619  ICD-9-CM: 719.51     Acute pain of left shoulder     ICD-10-CM: M25.512  ICD-9-CM: 719.41          Referring practitioner: Reyes Michaels, *  Date of Initial Visit: Type: THERAPY  Noted: 2022  Today's Date: 2022    VISIT#: 8    Subjective Patient reports her shoulder continues to bother her while sleeping.  If she has a good nights rest, her shoulder usually feels better the next day, but if she has not slept well, it is more painful.       Objective     See Exercise, Manual, and Modality Logs for complete treatment.     Assessment/Plan Increased tightness noted at L shoulder with PROM with overpressure into internal rotation and flexion.  Firm end feel noted prior to full ROM.  Patient with increased fatigue and soreness noted with AROM internal rotation and extension this date.  Estim used at end of session with decreased pain levels noted after.     Progress per Plan of Care and Progress strengthening /stabilization /functional activity         Timed:         Manual Therapy:    18     mins  06342;     Therapeutic Exercise:    25     mins  92022;     Neuromuscular Leo:        mins  81415;    Therapeutic Activity:          mins  47628;     Gait Training:           mins  90056;     Ultrasound:          mins  53780;    Ionto                                   mins   58609    Un-Timed:  Electrical Stimulation:    15     mins   64057 ( );  Dry Needling          mins self-pay 20560; 20561  Traction          mins 34757      Timed Treatment:   43   mins   Total Treatment:     58   mins    Teetee Carey PT  [place] License # [id number]  Physical Therapist

## 2022-09-27 ENCOUNTER — TREATMENT (OUTPATIENT)
Dept: PHYSICAL THERAPY | Facility: CLINIC | Age: 77
End: 2022-09-27

## 2022-09-27 DIAGNOSIS — M25.619 LIMITED RANGE OF MOTION (ROM) OF SHOULDER: ICD-10-CM

## 2022-09-27 DIAGNOSIS — M25.512 ACUTE PAIN OF LEFT SHOULDER: ICD-10-CM

## 2022-09-27 DIAGNOSIS — M75.02 ADHESIVE CAPSULITIS OF LEFT SHOULDER: Primary | ICD-10-CM

## 2022-09-27 PROCEDURE — 97140 MANUAL THERAPY 1/> REGIONS: CPT | Performed by: PHYSICAL THERAPIST

## 2022-09-27 PROCEDURE — G0283 ELEC STIM OTHER THAN WOUND: HCPCS | Performed by: PHYSICAL THERAPIST

## 2022-09-27 PROCEDURE — 97110 THERAPEUTIC EXERCISES: CPT | Performed by: PHYSICAL THERAPIST

## 2022-09-27 NOTE — PROGRESS NOTES
Physical Therapy Daily Progress Note      Patient: Akosua Munguia   : 1945  Diagnosis/ICD-10 Code:  No primary diagnosis found.  Referring practitioner: Reyes Michaels, *  Date of Initial Visit: Type: THERAPY  Noted: 2022  Today's Date: 2022  Patient seen for 9 sessions         Akosua Munguia reports: she is doing well overall but has continued soreness in the upper arm and it doesn't seem to have a particular activty or movement that flare sit up but rather the pain increases randomly and she still struggles to sleep on that side at all.    Objective   See Exercise, Manual, and Modality Logs for complete treatment.     Access Code: [email or web address]  URL: [email or web address]  Date: 2022  Prepared by: Jodee Vasquez    Exercises  Single Arm Shoulder Flexion with Dumbbell - 2 x daily - 7 x weekly - 1 sets - 10 reps    Assessment/Plan   Pt. continues to have return of PROM and AROM with no increase in pain however pain persist and at random times throughout her day. Pt. Responds well to gentle joint mobs and exercise for improving functional stability and motion at this time.    Goals  Plan Goals: STG:  -Patient will demonstrate increased L shoulder flexion and abduction AROM to 90 degrees for improved ability to perform reaching activities in 3 weeks.  -Patient will demonstrate increased L shoulder IR AROM to 45 degrees for improved ability to reach behind her back in 4 weeks.  -Patient will be independent with initial HEP in 2 weeks.    LTG:  -Patient will demonstrate increased L shoulder flexion and abduction to WFL for improved ability to reach overhead in 12 weeks.  -Patient will demonstrate increased strength of L shoulder to grossly 4+/5 in 12 weeks.  -Patient will demonstrate increased L shoulder functional IR T12 and ER to C6 in 12 weeks.  -Patient will report min to no L shoulder pain for improved sleep pattern in 12 weeks.    Progress strengthening /stabilization /functional  activity           Timed:         Manual Therapy:    15     mins  14692;     Therapeutic Exercise:    20     mins  97173;       Un-Timed:  Electrical Stimulation:    15     mins  71409 ( );      Timed Treatment:   35   mins   Total Treatment:     50   mins    Jodee Vasquez PTA  Physical Therapist Assistant License #[id number]

## 2022-09-29 ENCOUNTER — TREATMENT (OUTPATIENT)
Dept: PHYSICAL THERAPY | Facility: CLINIC | Age: 77
End: 2022-09-29

## 2022-09-29 DIAGNOSIS — M75.02 ADHESIVE CAPSULITIS OF LEFT SHOULDER: Primary | ICD-10-CM

## 2022-09-29 DIAGNOSIS — M25.619 LIMITED RANGE OF MOTION (ROM) OF SHOULDER: ICD-10-CM

## 2022-09-29 DIAGNOSIS — M25.512 ACUTE PAIN OF LEFT SHOULDER: ICD-10-CM

## 2022-09-29 PROCEDURE — 97530 THERAPEUTIC ACTIVITIES: CPT | Performed by: PHYSICAL THERAPIST

## 2022-09-29 PROCEDURE — G0283 ELEC STIM OTHER THAN WOUND: HCPCS | Performed by: PHYSICAL THERAPIST

## 2022-09-29 PROCEDURE — 97140 MANUAL THERAPY 1/> REGIONS: CPT | Performed by: PHYSICAL THERAPIST

## 2022-09-29 PROCEDURE — 97110 THERAPEUTIC EXERCISES: CPT | Performed by: PHYSICAL THERAPIST

## 2022-09-29 NOTE — PROGRESS NOTES
Physical Therapy Daily Progress Note      Patient: Akosua Munguia   : 1945  Diagnosis/ICD-10 Code:  Adhesive capsulitis of left shoulder [M75.02]   Problems Addressed this Visit        Musculoskeletal and Injuries    Adhesive capsulitis of left shoulder - Primary      Other Visit Diagnoses     Limited range of motion (ROM) of shoulder        Acute pain of left shoulder          Diagnoses       Codes Comments    Adhesive capsulitis of left shoulder    -  Primary ICD-10-CM: M75.02  ICD-9-CM: 726.0     Limited range of motion (ROM) of shoulder     ICD-10-CM: M25.619  ICD-9-CM: 719.51     Acute pain of left shoulder     ICD-10-CM: M25.512  ICD-9-CM: 719.41          Referring practitioner: Reyes Michaels, *  Date of Initial Visit: Type: THERAPY  Noted: 2022  Today's Date: 2022    VISIT#: 10    Subjective Patient reports she tried to put something in her opposite back pocket this morning but was unable due to tightness and pain.    QuickDASH 14%     Objective     See Exercise, Manual, and Modality Logs for complete treatment.     Assessment/Plan Tightness continues to be noted in the L shoulder with all planes of ROM. Patient able to tolerate PROM with overpressure into available end ROM with firm end feel noted but flexion and abduction limited to ~145 degrees this date.  Added pec doorway stretch for anterior capsule stretch with tightness and slight increase in shoulder pain reported.  Patient encouraged to perform within tolerable ROM.      Progress per Plan of Care and Progress strengthening /stabilization /functional activity         Timed:         Manual Therapy:    15     mins  09347;     Therapeutic Exercise:    18     mins  46175;     Neuromuscular Leo:        mins  17067;    Therapeutic Activity:     10     mins  52026;     Gait Training:           mins  86972;     Ultrasound:          mins  52685;    Ionto                                   mins   96686    Un-Timed:  Electrical  Stimulation:    15     mins  89540 ( );  Dry Needling          mins self-pay 69606; 20561  Traction          mins 27046      Timed Treatment:   43   mins   Total Treatment:     58   mins    Teetee Carey PT  [place] License # [id number]  Physical Therapist

## 2022-10-04 ENCOUNTER — TREATMENT (OUTPATIENT)
Dept: PHYSICAL THERAPY | Facility: CLINIC | Age: 77
End: 2022-10-04

## 2022-10-04 DIAGNOSIS — M75.02 ADHESIVE CAPSULITIS OF LEFT SHOULDER: Primary | ICD-10-CM

## 2022-10-04 DIAGNOSIS — M25.619 LIMITED RANGE OF MOTION (ROM) OF SHOULDER: ICD-10-CM

## 2022-10-04 DIAGNOSIS — M25.512 ACUTE PAIN OF LEFT SHOULDER: ICD-10-CM

## 2022-10-04 PROCEDURE — 97110 THERAPEUTIC EXERCISES: CPT | Performed by: PHYSICAL THERAPIST

## 2022-10-04 PROCEDURE — 97140 MANUAL THERAPY 1/> REGIONS: CPT | Performed by: PHYSICAL THERAPIST

## 2022-10-04 PROCEDURE — G0283 ELEC STIM OTHER THAN WOUND: HCPCS | Performed by: PHYSICAL THERAPIST

## 2022-10-04 NOTE — PROGRESS NOTES
Physical Therapy Daily Progress Note      Patient: Akosua Munguia   : 1945  Diagnosis/ICD-10 Code:  Adhesive capsulitis of left shoulder [M75.02]  Referring practitioner: Reyes Michaels, *  Date of Initial Visit: Type: THERAPY  Noted: 2022  Today's Date: 10/4/2022  Patient seen for 11 sessions         Akosua Munguia reports: she is still having deep pain and pain with certain movements most recently reaching back to close a glass sliding door caused sharp pain that revolved quickly after.    Objective   See Exercise, Manual, and Modality Logs for complete treatment.     Assessment/Plan   Pt. tolerates treatment well this date and gentle capsular mobs are effective in increasing ROM tolerance this date. Pt. Guards initially with PROM but relaxes a stretches are provided gently into overpressure.    Goals  Plan Goals: STG:  -Patient will demonstrate increased L shoulder flexion and abduction AROM to 90 degrees for improved ability to perform reaching activities in 3 weeks.  -Patient will demonstrate increased L shoulder IR AROM to 45 degrees for improved ability to reach behind her back in 4 weeks.  -Patient will be independent with initial HEP in 2 weeks.    LTG:  -Patient will demonstrate increased L shoulder flexion and abduction to WFL for improved ability to reach overhead in 12 weeks.  -Patient will demonstrate increased strength of L shoulder to grossly 4+/5 in 12 weeks.  -Patient will demonstrate increased L shoulder functional IR T12 and ER to C6 in 12 weeks.  -Patient will report min to no L shoulder pain for improved sleep pattern in 12 weeks.    Progress strengthening /stabilization /functional activity           Timed:         Manual Therapy:    15     mins  72691;     Therapeutic Exercise:    20     mins  80613;       Un-Timed:  Electrical Stimulation:    15     mins  74255 ( );    Timed Treatment:   35    mins   Total Treatment:     50   mins    Jodee Vasquez PTA  Physical  Therapist Assistant License #[id number]

## 2022-10-11 ENCOUNTER — TREATMENT (OUTPATIENT)
Dept: PHYSICAL THERAPY | Facility: CLINIC | Age: 77
End: 2022-10-11

## 2022-10-11 DIAGNOSIS — M25.512 ACUTE PAIN OF LEFT SHOULDER: ICD-10-CM

## 2022-10-11 DIAGNOSIS — M75.02 ADHESIVE CAPSULITIS OF LEFT SHOULDER: Primary | ICD-10-CM

## 2022-10-11 DIAGNOSIS — M25.619 LIMITED RANGE OF MOTION (ROM) OF SHOULDER: ICD-10-CM

## 2022-10-11 PROCEDURE — 97140 MANUAL THERAPY 1/> REGIONS: CPT | Performed by: PHYSICAL THERAPIST

## 2022-10-11 PROCEDURE — 97110 THERAPEUTIC EXERCISES: CPT | Performed by: PHYSICAL THERAPIST

## 2022-10-11 PROCEDURE — 97530 THERAPEUTIC ACTIVITIES: CPT | Performed by: PHYSICAL THERAPIST

## 2022-10-11 NOTE — PROGRESS NOTES
Physical Therapy Daily Progress Note      Patient: Akosua Munguia   : 1945  Diagnosis/ICD-10 Code:  Adhesive capsulitis of left shoulder [M75.02]   Problems Addressed this Visit        Musculoskeletal and Injuries    Adhesive capsulitis of left shoulder - Primary   Other Visit Diagnoses     Limited range of motion (ROM) of shoulder        Acute pain of left shoulder          Diagnoses       Codes Comments    Adhesive capsulitis of left shoulder    -  Primary ICD-10-CM: M75.02  ICD-9-CM: 726.0     Limited range of motion (ROM) of shoulder     ICD-10-CM: M25.619  ICD-9-CM: 719.51     Acute pain of left shoulder     ICD-10-CM: M25.512  ICD-9-CM: 719.41          Referring practitioner: Reyes Michaels, *  Date of Initial Visit: Type: THERAPY  Noted: 2022  Today's Date: 10/11/2022    VISIT#: 12    Subjective Patient reports she is doing well and is curious as to if she can transition to HEP.      Objective     See Exercise, Manual, and Modality Logs for complete treatment.     Assessment/Plan Progressed strengthening and ROM with patient reporting discomfort at anterior shoulder with internal and external ROM exercises.  Instructed in IR towel stretch and with pulleys for improved ability to performing reaching activities behind her back.  Her ROM is improving but tightness continues to be noted at ~160 degrees of flexion and abduction with firm end feel noted.  Patient to be placed on hold until follow up with MD.    Progress per Plan of Care and Progress strengthening /stabilization /functional activity         Timed:         Manual Therapy:    20     mins  54394;     Therapeutic Exercise:    18     mins  54120;     Neuromuscular Leo:        mins  38287;    Therapeutic Activity:     12     mins  58222;     Gait Training:           mins  85684;     Ultrasound:          mins  46197;    Ionto                                   mins   61113    Un-Timed:  Electrical Stimulation:         mins  72165  ( );  Dry Needling          mins self-pay 20560; 20561  Traction          mins 55915      Timed Treatment:   50   mins   Total Treatment:     50   mins    Teetee Carey PT  [place] License # [id number]  Physical Therapist

## 2022-10-13 ENCOUNTER — OFFICE VISIT (OUTPATIENT)
Dept: ORTHOPEDIC SURGERY | Facility: CLINIC | Age: 77
End: 2022-10-13

## 2022-10-13 VITALS — HEART RATE: 52 BPM | WEIGHT: 160 LBS | OXYGEN SATURATION: 95 % | HEIGHT: 65 IN | BODY MASS INDEX: 26.66 KG/M2

## 2022-10-13 DIAGNOSIS — M75.02 ADHESIVE CAPSULITIS OF LEFT SHOULDER: Primary | ICD-10-CM

## 2022-10-13 DIAGNOSIS — Z47.89 ORTHOPEDIC AFTERCARE: ICD-10-CM

## 2022-10-13 PROCEDURE — 99024 POSTOP FOLLOW-UP VISIT: CPT | Performed by: PHYSICIAN ASSISTANT

## 2022-10-13 NOTE — PROGRESS NOTES
" Patient ID: Akosua Munguia is a 76 y.o. female.  Shoulder manipulation 9/2/2022   Reports the ability to perform her HEP with ROM and stretching. States she can perform all PT maneuvers at home. She reports increased ROM but has pain with IR and reaching out and back to pick something up. Reports sleeping better at night, but cannot sleep prone or on the left side yet.  States [place] with no issue.  She states she has no more appointments with PT but that she could schedule if needed.  She states PT sessions involve only the things she can perform at home on her own.    Objective:  Pulse 52   Ht 165.1 cm (65\")   Wt 72.6 kg (160 lb)   LMP 01/01/1986   SpO2 95%   BMI 26.63 kg/m²     Physical Examination:   Left shoulder:   Intact skin. Forward flexion 130, Abduction 110, ER 10, IR L4  Belly press and lift off 5/5  Some resistance with passive ER that stopped about 10 degrees.     Imaging:   None to review today.     Assessment:    Diagnoses and all orders for this visit:    1. Adhesive capsulitis of left shoulder (Primary)  Overview:  Added automatically from request for surgery [id number]      2. Orthopedic aftercare     Plan: Continue daily HEP to include ROM, pulley and stretches. Follow up in 2 months with Dr. Michaels.     Disclaimer: Part of this note may be an electronic transcription/translation of spoken language to printed text using the Dragon Dictation System  "

## 2022-10-26 ENCOUNTER — OFFICE VISIT (OUTPATIENT)
Dept: FAMILY MEDICINE CLINIC | Facility: CLINIC | Age: 77
End: 2022-10-26

## 2022-10-26 VITALS
DIASTOLIC BLOOD PRESSURE: 72 MMHG | TEMPERATURE: 97.5 F | HEART RATE: 66 BPM | SYSTOLIC BLOOD PRESSURE: 134 MMHG | RESPIRATION RATE: 12 BRPM | WEIGHT: 155.4 LBS | HEIGHT: 65 IN | OXYGEN SATURATION: 98 % | BODY MASS INDEX: 25.89 KG/M2

## 2022-10-26 DIAGNOSIS — I10 HYPERTENSION, BENIGN: ICD-10-CM

## 2022-10-26 DIAGNOSIS — K21.9 GASTROESOPHAGEAL REFLUX DISEASE WITH STRICTURE: ICD-10-CM

## 2022-10-26 DIAGNOSIS — E78.2 MIXED HYPERLIPIDEMIA: Primary | ICD-10-CM

## 2022-10-26 DIAGNOSIS — J30.1 ACUTE SEASONAL ALLERGIC RHINITIS DUE TO POLLEN: ICD-10-CM

## 2022-10-26 DIAGNOSIS — K22.2 GASTROESOPHAGEAL REFLUX DISEASE WITH STRICTURE: ICD-10-CM

## 2022-10-26 PROCEDURE — 99214 OFFICE O/P EST MOD 30 MIN: CPT | Performed by: FAMILY MEDICINE

## 2022-10-26 RX ORDER — MONTELUKAST SODIUM 10 MG/1
10 TABLET ORAL NIGHTLY
Qty: 90 TABLET | Refills: 3 | Status: SHIPPED | OUTPATIENT
Start: 2022-10-26

## 2022-10-26 RX ORDER — HYDROCHLOROTHIAZIDE 25 MG/1
25 TABLET ORAL EVERY EVENING
Qty: 90 TABLET | Refills: 3 | Status: SHIPPED | OUTPATIENT
Start: 2022-10-26

## 2022-10-26 RX ORDER — LISINOPRIL 20 MG/1
20 TABLET ORAL EVERY EVENING
Qty: 90 TABLET | Refills: 3 | Status: SHIPPED | OUTPATIENT
Start: 2022-10-26

## 2022-10-26 RX ORDER — OMEPRAZOLE 20 MG/1
20 CAPSULE, DELAYED RELEASE ORAL EVERY EVENING
Qty: 90 CAPSULE | Refills: 3 | Status: SHIPPED | OUTPATIENT
Start: 2022-10-26

## 2022-10-26 NOTE — PROGRESS NOTES
Chief Complaint  Hyperlipidemia, Hypertension, Allergic Rhinitis, and Heartburn    Subjective     CC  Problem List  Visit Diagnosis   Encounters  Notes  Medications  Labs  Result Review Imaging  Media    Akosua Munguia presents to [facility] for   History of Present Illness  Akosua is here to discuss medication for Hyperlipidemia. She was taking rosuvastatin 5mg qd and stopped that 3 weeks ago. She said that while taking the medication she experienced muscle pain in shoulder, and neck. She states that once she stopped that medication symptoms resolved. She is wanting to check to see about replacing medication. Last lab was 03/2022 Cholesterol 204 was 206 last draw triglycerides 191, was 169, HDL 60 was 65, , was 112, cardiac ratio 3.4  was 3.2  Hypertension  This is a chronic problem. The current episode started more than 1 year ago. The problem is unchanged. The problem is controlled. Pertinent negatives include no blurred vision, chest pain, headaches, orthopnea, peripheral edema or shortness of breath. Risk factors for coronary artery disease include dyslipidemia, post-menopausal state and family history. Current antihypertension treatment includes beta blockers, diuretics and ACE inhibitors. The current treatment provides mild improvement. There are no compliance problems.    Hyperlipidemia  This is a chronic problem. The current episode started more than 1 year ago. The problem is uncontrolled. Recent lipid tests were reviewed and are high. She has no history of diabetes or obesity. Associated symptoms include myalgias ( on statin Rx). Pertinent negatives include no chest pain or shortness of breath. Treatments tried: Patient was taking a statin medication and stopped taking it 3 weeks ago due to muscle pain  Compliance problems include medication side effects.  Risk factors for coronary artery disease include hypertension and dyslipidemia.   Heartburn  She  "complains of heartburn. She reports no abdominal pain, no chest pain or no nausea. This is a recurrent problem. The current episode started 1 to 4 weeks ago. The problem has been gradually worsening (She has hx of stricture 2017, she is not on meds presently). Pertinent negatives include no weight loss.       Review of Systems   Constitutional: Negative for fever, unexpected weight gain and unexpected weight loss.   Eyes: Negative for blurred vision.   Respiratory: Negative for shortness of breath.    Cardiovascular: Negative for chest pain and orthopnea.   Gastrointestinal: Negative for abdominal pain, nausea and vomiting.   Endocrine: Negative for cold intolerance, heat intolerance, polydipsia and polyuria.   Musculoskeletal: Positive for myalgias ( on statin Rx).   Hematological: Negative for adenopathy.        Objective   Vital Signs:   /72 (BP Location: Right arm, Patient Position: Sitting, Cuff Size: Adult)   Pulse 66   Temp 97.5 °F (36.4 °C)   Resp 12   Ht 165.1 cm (65\")   Wt 70.5 kg (155 lb 6.4 oz)   SpO2 98% Comment: ROOM AIR  BMI 25.86 kg/m²     Physical Exam  Constitutional:       General: She is not in acute distress.  Cardiovascular:      Rate and Rhythm: Normal rate and regular rhythm.      Heart sounds: No murmur heard.  Pulmonary:      Effort: Pulmonary effort is normal.      Breath sounds: Normal breath sounds.   Musculoskeletal:      Cervical back: Neck supple.      Right lower leg: No edema.      Left lower leg: No edema.   Lymphadenopathy:      Cervical: No cervical adenopathy.   Skin:     Findings: No rash.   Neurological:      Mental Status: She is alert.        Result Review :Labs  Result Review  Imaging  Med Tab  Media     Lipid Panel    Lipid Panel 3/28/22   Total Cholesterol 204 (A)   Triglycerides 191 (A)   HDL Cholesterol 60   VLDL Cholesterol 33   LDL Cholesterol  111 (A)   (A) Abnormal value                      Assessment and Plan CC Problem List  Visit Diagnosis  " ROS  Review (Popup)  Health Maintenance  Quality  BestPractice  Medications  SmartSets  SnapShot Encounters  Media  Problem List Items Addressed This Visit        High    Hypertension, benign    Overview     Controlled. She is compliant wt meds which are continued.          Current Assessment & Plan     Hypertension is improving with treatment.  Continue current treatment regimen.  Dietary sodium restriction.  Regular aerobic exercise.  Blood pressure will be reassessed at the next regular appointment.         Relevant Medications    lisinopril (PRINIVIL,ZESTRIL) 20 MG tablet    hydroCHLOROthiazide (HYDRODIURIL) 25 MG tablet       Medium    Mixed hyperlipidemia - Primary    Overview     Myalgia w/ atorvastatin, resolved of meds. Repeat chol off meds, if values warrant Rx begin pravastatin and follow.             Unprioritized    Gastroesophageal reflux disease with stricture    Overview     minimal grade A with distal esophageal stricture dilated to [address] 2018  Sx are fairly well controlled on meds she has some concern of recurrence,resume PPI with GI referral if sxs don't improve.          Relevant Medications    omeprazole (priLOSEC) 20 MG capsule   Other Visit Diagnoses     Acute seasonal allergic rhinitis due to pollen        Relevant Medications    montelukast (SINGULAIR) 10 MG tablet          Follow Up Instructions Charge Capture  Follow-up Communications  Return if symptoms worsen or fail to improve.  Patient was given instructions and counseling regarding her condition or for health maintenance advice. Please see specific information pulled into the AVS if appropriate.

## 2022-10-28 ENCOUNTER — DOCUMENTATION (OUTPATIENT)
Dept: PHYSICAL THERAPY | Facility: CLINIC | Age: 77
End: 2022-10-28

## 2022-12-06 NOTE — PROGRESS NOTES
Discharge Summary  Discharge Summary from Physical Therapy Report      Dates  PT visit: 9/2/22 - 10/11/22  Number of Visits: 12     Discharge Status of Patient: See MD Note dated 10/11/22    Goals: Partially Met    Discharge Plan: Continue with current home exercise program as instructed    Comments Patient responded well to PT following manipulation of L shoulder due to adhesive capsulitis and verbalized readiness for discharge.  D/C from PT at this time.    Date of Discharge 10/28/2022        Teetee Carey, PT  Physical Therapist

## 2022-12-12 ENCOUNTER — OFFICE VISIT (OUTPATIENT)
Dept: ORTHOPEDIC SURGERY | Facility: CLINIC | Age: 77
End: 2022-12-12

## 2022-12-12 VITALS — HEIGHT: 65 IN | HEART RATE: 54 BPM | WEIGHT: 156 LBS | BODY MASS INDEX: 25.99 KG/M2

## 2022-12-12 DIAGNOSIS — Z47.89 ORTHOPEDIC AFTERCARE: Primary | ICD-10-CM

## 2022-12-12 PROCEDURE — 99212 OFFICE O/P EST SF 10 MIN: CPT | Performed by: PHYSICIAN ASSISTANT

## 2022-12-12 NOTE — PROGRESS NOTES
"   Patient ID: Akosua Munguia is a 76 y.o. female presenting for follow-up on left shoulder manual manipulation on 9/2/2022 that was performed due to adhesive capsulitis. Since her last appointment on 10/13/2022 she reports improvement in motion.     Objective:  Pulse 54   Ht 165.1 cm (65\")   Wt 70.8 kg (156 lb)   LMP 01/01/1986   BMI 25.96 kg/m²     Physical Examination:  Left shoulder:  Active forward flexion 120, abduction 90, ER 20, IR L4    Imaging:   None to review.    Assessment:    Diagnoses and all orders for this visit:    1. Orthopedic aftercare (Primary)      Plan: Continue HEP to maintain range of motion of the left shoulder.  Follow-up as needed.      Disclaimer: Part of this note may be an electronic transcription/translation of spoken language to printed text using the Dragon Dictation System  "

## 2023-01-16 ENCOUNTER — TELEPHONE (OUTPATIENT)
Dept: FAMILY MEDICINE CLINIC | Facility: CLINIC | Age: 78
End: 2023-01-16
Payer: MEDICARE

## 2023-01-16 DIAGNOSIS — E78.2 MIXED HYPERLIPIDEMIA: Primary | ICD-10-CM

## 2023-01-16 NOTE — TELEPHONE ENCOUNTER
Patient has been called and she said that she was just wanting to get her cholesterol checked as she was instructed to do in December and she said that she will do the rest at her wellness. The order has been placed for her to come and get checked .

## 2023-01-16 NOTE — TELEPHONE ENCOUNTER
Caller: Akosua Munguia     Relationship: Self    Best call back number: [phone]    What orders are you requesting (i.e. lab or imaging): CHOLESTEROL LABS, LAB BEFORE APPOINTMENT     In what timeframe would the patient need to come in: BEFORE 01/24/23    Where will you receive your lab/imaging services: OFFICE    Additional notes: PATIENT CALLED AND MADE AN APPOINTMENT TO DISCUSS HER CHOLESTEROL MEDICATION WITH DR. BETH AND WOULD LIKE TO HAVE LABS DONE BEFOREHAND

## 2023-01-18 LAB
CHOLEST SERPL-MCNC: 288 MG/DL (ref 100–199)
CHOLEST/HDLC SERPL: 4.6 RATIO (ref 0–4.4)
HDLC SERPL-MCNC: 62 MG/DL
LDLC SERPL CALC-MCNC: 187 MG/DL (ref 0–99)
TRIGL SERPL-MCNC: 208 MG/DL (ref 0–149)
VLDLC SERPL CALC-MCNC: 39 MG/DL (ref 5–40)

## 2023-01-23 NOTE — PROGRESS NOTES
Chief Complaint  No chief complaint on file.    Subjective     CC  Problem List  Visit Diagnosis   Encounters  Notes  Medications  Labs  Result Review Imaging  Media    Akosua Munguia presents to [facility] for   History of Present Illness  Akosua is here to discuss medication for Hyperlipidemia. She was taking rosuvastatin 5mg qd and stopped that 3 weeks ago. She said that while taking the medication she experienced muscle pain in shoulder, and neck. She states that once she stopped that medication symptoms resolved. She is wanting to check to see about replacing medication. Last lab was 03/2022 Cholesterol 204 was 206 last draw triglycerides 191, was 169, HDL 60 was 65, , was 112, cardiac ratio 3.4  was 3.2  Hyperlipidemia  This is a chronic problem. The current episode started more than 1 year ago. The problem is uncontrolled. Recent lipid tests were reviewed and are high. She has no history of diabetes or obesity. Associated symptoms include myalgias ( on statin Rx). Pertinent negatives include no chest pain, focal sensory loss, focal weakness, leg pain or shortness of breath. Compliance problems include medication side effects.  Risk factors for coronary artery disease include hypertension and dyslipidemia.   Hypertension  This is a chronic problem. The current episode started more than 1 year ago. The problem is unchanged. The problem is controlled. Pertinent negatives include no blurred vision, chest pain, headaches, orthopnea, peripheral edema or shortness of breath. Risk factors for coronary artery disease include dyslipidemia, post-menopausal state and family history. Current antihypertension treatment includes beta blockers, diuretics and ACE inhibitors. The current treatment provides mild improvement. There are no compliance problems.        Review of Systems   Eyes: Negative for blurred vision.   Respiratory: Negative for shortness of breath.     Cardiovascular: Negative for chest pain and orthopnea.   Musculoskeletal: Positive for myalgias ( on statin Rx).   Neurological: Negative for focal weakness.        Objective   Vital Signs:   There were no vitals taken for this visit.    Physical Exam   Result Review :Labs  Result Review  Imaging  Med Tab  Media   {The following data was reviewed by (Optional):10954}  {Ambulatory Labs (Optional):05377}  {Data reviewed (Optional):55222:::1}          Assessment and Plan CC Problem List  Visit Diagnosis  ROS  Review (Popup)  Health Maintenance  Quality  BestPractice  Medications  SmartSets  SnapShot Encounters  Media  Problem List Items Addressed This Visit        Cardiac and Vasculature    Hypertension, benign - Primary    Overview     Controlled. She is compliant [facility] meds which are continued.           {Time Spent (Optional):25800}  Follow Up Instructions Charge Capture  Follow-up Communications  No follow-ups on file.  Patient was given instructions and counseling regarding her condition or for health maintenance advice. Please see specific information pulled into the AVS if appropriate.   Answers for HPI/ROS submitted by the patient on 1/17/2023  Please describe your symptoms.: High chostrol  Have you had these symptoms before?: Yes  How long have you been having these symptoms?: Greater than 2 weeks  Please list any medications you are currently taking for this condition.: None  What is the primary reason for your visit?: Other

## 2023-01-24 ENCOUNTER — OFFICE VISIT (OUTPATIENT)
Dept: FAMILY MEDICINE CLINIC | Facility: CLINIC | Age: 78
End: 2023-01-24
Payer: MEDICARE

## 2023-01-24 VITALS
HEART RATE: 63 BPM | SYSTOLIC BLOOD PRESSURE: 146 MMHG | DIASTOLIC BLOOD PRESSURE: 82 MMHG | OXYGEN SATURATION: 99 % | WEIGHT: 158 LBS | RESPIRATION RATE: 16 BRPM | HEIGHT: 65 IN | BODY MASS INDEX: 26.33 KG/M2 | TEMPERATURE: 97.3 F

## 2023-01-24 DIAGNOSIS — I10 HYPERTENSION, BENIGN: Primary | ICD-10-CM

## 2023-01-24 DIAGNOSIS — H81.13 BENIGN PAROXYSMAL POSITIONAL VERTIGO DUE TO BILATERAL VESTIBULAR DISORDER: ICD-10-CM

## 2023-01-24 DIAGNOSIS — E66.3 OVERWEIGHT WITH BODY MASS INDEX (BMI) OF 26 TO 26.9 IN ADULT: ICD-10-CM

## 2023-01-24 DIAGNOSIS — E78.2 MIXED HYPERLIPIDEMIA: ICD-10-CM

## 2023-01-24 PROCEDURE — 99214 OFFICE O/P EST MOD 30 MIN: CPT | Performed by: FAMILY MEDICINE

## 2023-01-24 RX ORDER — MECLIZINE HYDROCHLORIDE 25 MG/1
25 TABLET ORAL 4 TIMES DAILY
Qty: 40 TABLET | Refills: 3 | Status: SHIPPED | OUTPATIENT
Start: 2023-01-24

## 2023-01-24 RX ORDER — ROSUVASTATIN CALCIUM 5 MG/1
5 TABLET, COATED ORAL DAILY
Qty: 90 TABLET | Refills: 3 | Status: SHIPPED | OUTPATIENT
Start: 2023-01-24

## 2023-01-24 NOTE — PROGRESS NOTES
Chief Complaint  Follow-up, Hypertension, and Hyperlipidemia    Subjective     CC  Problem List  Visit Diagnosis   Encounters  Notes  Medications  Labs  Result Review Imaging  Media    Akosua Munguia presents to [facility] for   Hypertension  This is a chronic problem. The current episode started more than 1 year ago. The problem is unchanged. The problem is controlled. Pertinent negatives include no anxiety, blurred vision, chest pain, headaches, malaise/fatigue, neck pain, orthopnea, palpitations, peripheral edema, PND, shortness of breath or sweats. Risk factors for coronary artery disease include dyslipidemia, post-menopausal state and family history. Current antihypertension treatment includes beta blockers, diuretics and ACE inhibitors. The current treatment provides mild improvement. There are no compliance problems.    Hyperlipidemia  This is a chronic problem. The current episode started more than 1 year ago. The problem is uncontrolled. Recent lipid tests were reviewed and are high. She has no history of diabetes or obesity. Pertinent negatives include no chest pain, focal sensory loss, focal weakness, leg pain, myalgias ( on statin Rx) or shortness of breath. Treatments tried:  She has not tolerated crestor. It resulted in moderate Myalgias. Her cholesrerol however has significantly worsened.  Compliance problems include medication side effects.  Risk factors for coronary artery disease include hypertension and dyslipidemia.       Review of Systems   Constitutional: Negative for appetite change, fever, malaise/fatigue, unexpected weight gain and unexpected weight loss.   Eyes: Negative for blurred vision.   Respiratory: Negative for shortness of breath and wheezing.    Cardiovascular: Negative for chest pain, palpitations, orthopnea and PND.   Endocrine: Negative for cold intolerance, polydipsia and polyuria.   Genitourinary: Negative for dysuria.  "  Musculoskeletal: Negative for myalgias ( on statin Rx) and neck pain.   Skin: Negative for rash.   Neurological: Positive for dizziness. Negative for focal weakness, weakness and numbness.   Hematological: Negative for adenopathy. Does not bruise/bleed easily.        Objective   Vital Signs:   /82 (BP Location: Right arm, Patient Position: Sitting, Cuff Size: Adult)   Pulse 63   Temp 97.3 °F (36.3 °C) (Infrared)   Resp 16   Ht 165.1 cm (65\")   Wt 71.7 kg (158 lb)   SpO2 99% Comment: room air  BMI 26.29 kg/m²     Physical Exam  Constitutional:       General: She is not in acute distress.  Neck:      Thyroid: No thyromegaly.      Vascular: No JVD.   Cardiovascular:      Rate and Rhythm: Normal rate and regular rhythm.      Heart sounds: No murmur heard.  Pulmonary:      Effort: Pulmonary effort is normal.      Breath sounds: Normal breath sounds.   Musculoskeletal:      Cervical back: Neck supple.   Lymphadenopathy:      Cervical: No cervical adenopathy.   Skin:     Findings: No rash.   Neurological:      Mental Status: She is alert and oriented to person, place, and time.      Sensory: No sensory deficit.      Motor: No weakness.      Coordination: Coordination normal.      Gait: Gait normal.      Comments: Head motion reproduces sxs        Result Review :Labs  Result Review  Imaging  Med Tab  Media                 Assessment and Plan CC Problem List  Visit Diagnosis  ROS  Review (Popup)  Health Maintenance  Quality  BestPractice  Medications  SmartSets  SnapShot Encounters  Media  Problem List Items Addressed This Visit        High    Hypertension, benign - Primary    Overview     Controlled. She is compliant [facility] meds which are continued.          Current Assessment & Plan     Hypertension is improving with treatment.  Continue current treatment regimen.  Blood pressure will be reassessed in 3 months.            Medium    Mixed hyperlipidemia    Overview     Myalgia w/ atorvastatin, " resolved off meds. Repeated chol off meds,  values warrant Rx resume rosuvastatin and follow.          Relevant Medications    rosuvastatin (CRESTOR) 5 MG tablet       Low    Overweight with body mass index (BMI) of 26 to 26.9 in adult    Overview     Healthy diet and regular exercise            Unprioritized    Benign paroxysmal positional vertigo due to bilateral vestibular disorder    Overview     Continue Allergy Rx, continue meclazine prn. ENT referral if sxs continue or worsen.          Relevant Medications    meclizine (ANTIVERT) 25 MG tablet       Follow Up Instructions Charge Capture  Follow-up Communications  Return in about 3 months (around 4/24/2023), or if symptoms worsen or fail to improve.  Patient was given instructions and counseling regarding her condition or for health maintenance advice. Please see specific information pulled into the AVS if appropriate.   Answers for HPI/ROS submitted by the patient on 1/17/2023  Please describe your symptoms.: High chostrol  Have you had these symptoms before?: Yes  How long have you been having these symptoms?: Greater than 2 weeks  Please list any medications you are currently taking for this condition.: None  What is the primary reason for your visit?: Other

## 2023-04-17 DIAGNOSIS — K21.9 GASTROESOPHAGEAL REFLUX DISEASE WITH STRICTURE: ICD-10-CM

## 2023-04-17 DIAGNOSIS — K22.2 GASTROESOPHAGEAL REFLUX DISEASE WITH STRICTURE: ICD-10-CM

## 2023-04-17 RX ORDER — OMEPRAZOLE 20 MG/1
CAPSULE, DELAYED RELEASE ORAL
Qty: 90 CAPSULE | Refills: 4 | Status: SHIPPED | OUTPATIENT
Start: 2023-04-17

## 2023-04-24 DIAGNOSIS — I10 HYPERTENSION, BENIGN: ICD-10-CM

## 2023-04-24 RX ORDER — ATENOLOL 25 MG/1
TABLET ORAL
Qty: 180 TABLET | Refills: 3 | Status: SHIPPED | OUTPATIENT
Start: 2023-04-24

## 2023-05-04 NOTE — TELEPHONE ENCOUNTER
Caller: Akosua Munguia    Relationship: Self    Best call back number: [phone]    Requested Prescriptions:   Requested Prescriptions     Pending Prescriptions Disp Refills   • cetirizine (zyrTEC) 10 MG tablet       Sig: Take 1 tablet by mouth Every Evening.        Pharmacy where request should be sent: [address] [phone] [facility] [phone]      Last office visit with prescribing clinician: 1/24/2023   Last telemedicine visit with prescribing clinician: Visit date not found   Next office visit with prescribing clinician: Visit date not found     Does the patient have less than a 3 day supply:  [x] Yes  [] No    Nadiya Valentin Rep   05/04/23 15:06 EDT

## 2023-05-05 RX ORDER — CETIRIZINE HYDROCHLORIDE 10 MG/1
10 TABLET ORAL EVERY EVENING
Qty: 90 TABLET | Refills: 3 | Status: SHIPPED | OUTPATIENT
Start: 2023-05-05

## 2023-05-08 NOTE — PROGRESS NOTES
Physical Therapy Daily Progress Note      Patient: Akosua Munguia   : 1945  Diagnosis/ICD-10 Code:  Pain, neck [M54.2]  Referring practitioner: Jenny Keenan MD  Date of Initial Visit: Type: THERAPY  Noted: 2021  Today's Date: 2021  Patient seen for 2 sessions. POC 20, 2x/wk.              Subjective :  Pain 3/10 , burning cervical into B shoulders. She brought in home traction unit which she has not used in a while so she forgot how to use use it.     Objective   See Exercise, Manual, and Modality Logs for complete treatment.   Pt. Instructed in how to use home cervical traction unit.     Education:  Pt. Instructed how to use home cervical traction unit and did treatment.  Pt. Used 12# x 7'. Pt. Also instructed in proper posture, use of lumbar support, towel roll in pillow when sleeping  and raising rearview mirror when driving.     Assessment/Plan:  Spent a lot of time on education as patient had multiple questions.  She appeared to understand how to use home traction unit and will start using @ home.  She has considerable tension in B UT and levator.     Progress per Plan of Care:  Monitor response to home cervical traction unit.         Timed:                                                                           Manual Therapy:           10      mins  28933;                  Therapeutic Exercise:    20     mins  28146;     Neuromuscular Leo:        mins  58469;    Therapeutic Activity:           mins  30265;     Gait Training:                      mins  79063;     Ultrasound:                          mins  62776;    Ionto                                   mins   82970  Can Repos                          mins 68587     Un-Timed:  Electrical Stimulation:         mins  36700 ( );  Dry Needling                       mins self-pay  Traction                         10      mins 67330  Low Eval                             Mins  54105  Mod Eval                             Mins   Refill request for following medication denied.  Patient no longer under Suresh WILDER's care.   Disp Refills Start End    diclofenac (VOLTAREN) 75 MG EC tablet 60 tablet 0 3/10/2023     Sig - Route: Take 1 tablet by mouth 2 times daily as needed (Pain). Indications: Shoulder Pain - Oral       27955  High Eval                            Mins  80742  Self - Care                          mins  43487           Timed Treatment:   30   mins   Total Treatment:     40   mins      Nabila Ceja PTA  Physical Therapist Assistant

## 2023-05-15 ENCOUNTER — TELEPHONE (OUTPATIENT)
Dept: FAMILY MEDICINE CLINIC | Facility: CLINIC | Age: 78
End: 2023-05-15
Payer: MEDICARE

## 2023-05-15 DIAGNOSIS — I10 HYPERTENSION, BENIGN: ICD-10-CM

## 2023-05-15 NOTE — TELEPHONE ENCOUNTER
Caller: Akosua Munguia    Relationship: Self    Best call back number: [phone]    What orders are you requesting (i.e. lab or imaging): LABS     In what timeframe would the patient need to come in: OV ON 05.24.23    Where will you receive your lab/imaging services: UNKNOWN    PLEASE CALL PATIENT TO SCHEDULE

## 2023-05-15 NOTE — TELEPHONE ENCOUNTER
Want to reach out to the patient to see if with her upcoming appointment we can get her scheduled for her wellness as she is due and we will check all of her labs instead of just her cholesterol

## 2023-05-15 NOTE — TELEPHONE ENCOUNTER
Caller: Akosua Munguia    Relationship: Self    Best call back number: [phone]    Requested Prescriptions:   Requested Prescriptions     Pending Prescriptions Disp Refills   • hydroCHLOROthiazide (HYDRODIURIL) 25 MG tablet 90 tablet 3     Sig: Take 1 tablet by mouth Every Evening.        Pharmacy where request should be sent: [address] [phone] [facility] [phone]      Last office visit with prescribing clinician: 1/24/2023   Last telemedicine visit with prescribing clinician: 5/15/2023   Next office visit with prescribing clinician: 5/24/2023     Additional details provided by patient: PATIENT IS ALMOST OUT. PATIENT IS REQUESTING TO HAVE HER MED LIST SENT TO THE [facility]     Does the patient have less than a 3 day supply:  [x] Yes  [] No    Would you like a call back once the refill request has been completed: [x] Yes [] No    If the office needs to give you a call back, can they leave a voicemail: [x] Yes [] No    Nadiya Way Rep   05/15/23 16:28 EDT

## 2023-05-16 DIAGNOSIS — E78.2 MIXED HYPERLIPIDEMIA: ICD-10-CM

## 2023-05-16 DIAGNOSIS — I10 HYPERTENSION, BENIGN: Primary | ICD-10-CM

## 2023-05-16 RX ORDER — HYDROCHLOROTHIAZIDE 25 MG/1
25 TABLET ORAL EVERY EVENING
Qty: 90 TABLET | Refills: 3 | Status: SHIPPED | OUTPATIENT
Start: 2023-05-16

## 2023-05-16 NOTE — TELEPHONE ENCOUNTER
Called and spoke to patient and she said that she ment for all of her labs to be done at her upcoming visit as she was needing her wellness. Her appointment has been changed to a wellness and we have dropped in her full lab panel to get completed at [facility] next week at her request.

## 2023-05-26 LAB
ALBUMIN SERPL-MCNC: 4.8 G/DL (ref 3.7–4.7)
ALBUMIN/GLOB SERPL: 2.1 {RATIO} (ref 1.2–2.2)
ALP SERPL-CCNC: 63 IU/L (ref 44–121)
ALT SERPL-CCNC: 15 IU/L (ref 0–32)
AST SERPL-CCNC: 23 IU/L (ref 0–40)
BASOPHILS # BLD AUTO: 0 X10E3/UL (ref 0–0.2)
BASOPHILS NFR BLD AUTO: 1 %
BILIRUB SERPL-MCNC: 0.7 MG/DL (ref 0–1.2)
BUN SERPL-MCNC: 21 MG/DL (ref 8–27)
BUN/CREAT SERPL: 18 (ref 12–28)
CALCIUM SERPL-MCNC: 9.9 MG/DL (ref 8.7–10.3)
CHLORIDE SERPL-SCNC: 99 MMOL/L (ref 96–106)
CHOLEST SERPL-MCNC: 171 MG/DL (ref 100–199)
CHOLEST/HDLC SERPL: 2.6 RATIO (ref 0–4.4)
CO2 SERPL-SCNC: 25 MMOL/L (ref 20–29)
CREAT SERPL-MCNC: 1.17 MG/DL (ref 0.57–1)
EGFRCR SERPLBLD CKD-EPI 2021: 48 ML/MIN/1.73
EOSINOPHIL # BLD AUTO: 0.1 X10E3/UL (ref 0–0.4)
EOSINOPHIL NFR BLD AUTO: 2 %
ERYTHROCYTE [DISTWIDTH] IN BLOOD BY AUTOMATED COUNT: 12.7 % (ref 11.7–15.4)
GLOBULIN SER CALC-MCNC: 2.3 G/DL (ref 1.5–4.5)
GLUCOSE SERPL-MCNC: 93 MG/DL (ref 70–99)
HCT VFR BLD AUTO: 37.3 % (ref 34–46.6)
HDLC SERPL-MCNC: 67 MG/DL
HGB BLD-MCNC: 13.9 G/DL (ref 11.1–15.9)
IMM GRANULOCYTES # BLD AUTO: 0 X10E3/UL (ref 0–0.1)
IMM GRANULOCYTES NFR BLD AUTO: 0 %
LDLC SERPL CALC-MCNC: 85 MG/DL (ref 0–99)
LYMPHOCYTES # BLD AUTO: 1.1 X10E3/UL (ref 0.7–3.1)
LYMPHOCYTES NFR BLD AUTO: 17 %
MCH RBC QN AUTO: 34.8 PG (ref 26.6–33)
MCHC RBC AUTO-ENTMCNC: 37.3 G/DL (ref 31.5–35.7)
MCV RBC AUTO: 93 FL (ref 79–97)
MONOCYTES # BLD AUTO: 0.6 X10E3/UL (ref 0.1–0.9)
MONOCYTES NFR BLD AUTO: 10 %
MORPHOLOGY BLD-IMP: ABNORMAL
NEUTROPHILS # BLD AUTO: 4.5 X10E3/UL (ref 1.4–7)
NEUTROPHILS NFR BLD AUTO: 70 %
PLATELET # BLD AUTO: 230 X10E3/UL (ref 150–450)
POTASSIUM SERPL-SCNC: 4 MMOL/L (ref 3.5–5.2)
PROT SERPL-MCNC: 7.1 G/DL (ref 6–8.5)
RBC # BLD AUTO: 4 X10E6/UL (ref 3.77–5.28)
SODIUM SERPL-SCNC: 138 MMOL/L (ref 134–144)
TRIGL SERPL-MCNC: 109 MG/DL (ref 0–149)
TSH SERPL DL<=0.005 MIU/L-ACNC: 2.06 UIU/ML (ref 0.45–4.5)
VLDLC SERPL CALC-MCNC: 19 MG/DL (ref 5–40)
WBC # BLD AUTO: 6.4 X10E3/UL (ref 3.4–10.8)

## 2023-05-30 ENCOUNTER — OFFICE VISIT (OUTPATIENT)
Dept: FAMILY MEDICINE CLINIC | Facility: CLINIC | Age: 78
End: 2023-05-30
Payer: MEDICARE

## 2023-05-30 VITALS
HEART RATE: 59 BPM | OXYGEN SATURATION: 98 % | SYSTOLIC BLOOD PRESSURE: 118 MMHG | DIASTOLIC BLOOD PRESSURE: 70 MMHG | HEIGHT: 65 IN | WEIGHT: 154.2 LBS | BODY MASS INDEX: 25.69 KG/M2 | RESPIRATION RATE: 16 BRPM | TEMPERATURE: 97.1 F

## 2023-05-30 DIAGNOSIS — K22.2 GASTROESOPHAGEAL REFLUX DISEASE WITH STRICTURE: ICD-10-CM

## 2023-05-30 DIAGNOSIS — Z00.00 MEDICARE ANNUAL WELLNESS VISIT, SUBSEQUENT: Primary | ICD-10-CM

## 2023-05-30 DIAGNOSIS — N18.32 STAGE 3B CHRONIC KIDNEY DISEASE: ICD-10-CM

## 2023-05-30 DIAGNOSIS — Z12.11 COLON CANCER SCREENING: ICD-10-CM

## 2023-05-30 DIAGNOSIS — E66.3 OVERWEIGHT WITH BODY MASS INDEX (BMI) OF 26 TO 26.9 IN ADULT: ICD-10-CM

## 2023-05-30 DIAGNOSIS — J30.89 CHRONIC NONSEASONAL ALLERGIC RHINITIS DUE TO POLLEN: ICD-10-CM

## 2023-05-30 DIAGNOSIS — M15.9 GENERALIZED OSTEOARTHROSIS: ICD-10-CM

## 2023-05-30 DIAGNOSIS — Z80.3 FAMILY HISTORY OF BREAST CANCER: ICD-10-CM

## 2023-05-30 DIAGNOSIS — M85.89 OSTEOPENIA OF MULTIPLE SITES: ICD-10-CM

## 2023-05-30 DIAGNOSIS — Z12.4 CERVICAL CANCER SCREENING: ICD-10-CM

## 2023-05-30 DIAGNOSIS — K21.9 GASTROESOPHAGEAL REFLUX DISEASE WITH STRICTURE: ICD-10-CM

## 2023-05-30 DIAGNOSIS — Z12.31 ENCOUNTER FOR SCREENING MAMMOGRAM FOR MALIGNANT NEOPLASM OF BREAST: ICD-10-CM

## 2023-05-30 DIAGNOSIS — I12.9 BENIGN HYPERTENSION WITH CHRONIC KIDNEY DISEASE: ICD-10-CM

## 2023-05-30 DIAGNOSIS — R92.2 DENSE BREAST: ICD-10-CM

## 2023-05-30 DIAGNOSIS — E78.2 MIXED HYPERLIPIDEMIA: ICD-10-CM

## 2023-05-30 DIAGNOSIS — I10 HYPERTENSION, BENIGN: ICD-10-CM

## 2023-05-30 DIAGNOSIS — R32 URINARY INCONTINENCE IN FEMALE: ICD-10-CM

## 2023-05-30 LAB
BILIRUB BLD-MCNC: NEGATIVE MG/DL
CLARITY, POC: CLEAR
COLOR UR: ABNORMAL
GLUCOSE UR STRIP-MCNC: NEGATIVE MG/DL
KETONES UR QL: ABNORMAL
LEUKOCYTE EST, POC: NEGATIVE
NITRITE UR-MCNC: NEGATIVE MG/ML
PH UR: 7.5 [PH] (ref 5–8)
PROT UR STRIP-MCNC: ABNORMAL MG/DL
RBC # UR STRIP: ABNORMAL /UL
SP GR UR: 1 (ref 1–1.03)
UROBILINOGEN UR QL: NORMAL

## 2023-05-30 RX ORDER — ATENOLOL 25 MG/1
25 TABLET ORAL 2 TIMES DAILY
Qty: 180 TABLET | Refills: 3 | Status: SHIPPED | OUTPATIENT
Start: 2023-05-30

## 2023-05-30 RX ORDER — MONTELUKAST SODIUM 10 MG/1
10 TABLET ORAL NIGHTLY
Qty: 90 TABLET | Refills: 3 | Status: SHIPPED | OUTPATIENT
Start: 2023-05-30

## 2023-05-30 RX ORDER — ROSUVASTATIN CALCIUM 5 MG/1
5 TABLET, COATED ORAL DAILY
Qty: 90 TABLET | Refills: 3 | Status: SHIPPED | OUTPATIENT
Start: 2023-05-30

## 2023-05-30 RX ORDER — LISINOPRIL 20 MG/1
20 TABLET ORAL EVERY EVENING
Qty: 90 TABLET | Refills: 3 | Status: SHIPPED | OUTPATIENT
Start: 2023-05-30

## 2023-05-30 RX ORDER — OMEPRAZOLE 20 MG/1
20 CAPSULE, DELAYED RELEASE ORAL DAILY
Qty: 90 CAPSULE | Refills: 4 | Status: SHIPPED | OUTPATIENT
Start: 2023-05-30

## 2023-05-30 NOTE — PROGRESS NOTES
The ABCs of the Annual Wellness Visit  Subsequent Medicare Wellness Visit    Chief Complaint   Patient presents with    Medicare Wellness-subsequent    Hyperlipidemia    Hypertension       Subjective   History of Present Illness:  Akosua Munguia is a 77 y.o. female who presents for a Subsequent Medicare Wellness Visit. The patient is here: for coordination of medical care to discuss health maintenance and disease prevention to follow up on multiple medical problems. Overall has: moderate activity with work/home activities, exercises 2 - 3 times per week, good appetite, feels well with minor complaints, good energy level, is sleeping well and is sleeping poorly. Nutrition: appropriate balanced diet and eating a variety of foods. Last tetanus shot was   . Akosua Munguia is -4, Parity-2. Patient's last menstrual period was 1986. She is postmenopausal.          HEALTH RISK ASSESSMENT    Recent Hospitalizations:  No hospitalization(s) within the last year.    Current Medical Providers:  Patient Care Team:  Jenny Keenan MD as PCP - General  Alexandru Baker MD as Consulting Physician (Nephrology)  Banet, Duane Edward, MD as Consulting Physician (Dermatology)    Smoking Status:  Social History     Tobacco Use   Smoking Status Never   Smokeless Tobacco Never   Tobacco Comments    No Smoke Exposure       Alcohol Consumption:  Social History     Substance and Sexual Activity   Alcohol Use Not Currently       Depression Screen:       2023     9:00 AM   PHQ-2/PHQ-9 Depression Screening   Little Interest or Pleasure in Doing Things 0-->not at all   Feeling Down, Depressed or Hopeless 0-->not at all   PHQ-9: Brief Depression Severity Measure Score 0       I spent less than 16 minutes asking patient questions, counseling and documenting in the chart    Fall Risk Screen:  STEADI Fall Risk Assessment was completed, and patient is at LOW risk for falls.Assessment completed on:2023    Health Habits and  Functional and Cognitive Screenin/30/2023     9:00 AM   Functional & Cognitive Status   Do you have difficulty preparing food and eating? No   Do you have difficulty bathing yourself, getting dressed or grooming yourself? No   Do you have difficulty using the toilet? No   Do you have difficulty moving around from place to place? No   Do you have trouble with steps or getting out of a bed or a chair? No   Current Diet Well Balanced Diet   Dental Exam Up to date   Eye Exam Not up to date   Exercise (times per week) 3 times per week   Current Exercises Include Walking   Do you need help using the phone?  No   Are you deaf or do you have serious difficulty hearing?  No   Do you need help with transportation? No   Do you need help shopping? No   Do you need help preparing meals?  No   Do you need help with housework?  No   Do you need help with laundry? No   Do you need help taking your medications? No   Do you need help managing money? No   Do you ever drive or ride in a car without wearing a seat belt? No   Have you felt unusual stress, anger or loneliness in the last month? No   Who do you live with? Spouse   If you need help, do you have trouble finding someone available to you? No   Have you been bothered in the last four weeks by sexual problems? No   Do you have difficulty concentrating, remembering or making decisions? No       Does the patient have evidence of cognitive impairment? No    Asprin use counseling:Taking ASA appropriately as indicated    Recent Lab Results:  Lab Results   Component Value Date    CHLPL 171 2023    TRIG 109 2023    HDL 67 2023    LDL 85 2023    VLDL 19 2023        Age-appropriate Screening Schedule:  Refer to the list below for future screening recommendations based on patient's age, sex and/or medical conditions. Orders for these recommended tests are listed in the plan section. The patient has been provided with a written plan.    Health  Maintenance   Topic Date Due    DXA SCAN  02/09/2019    COVID-19 Vaccine (5 - Moderna series) 12/22/2022    INFLUENZA VACCINE  10/01/2023    LIPID PANEL  05/25/2024    ANNUAL WELLNESS VISIT  05/30/2024    TDAP/TD VACCINES (4 - Td or Tdap) 11/05/2024    HEPATITIS C SCREENING  Completed    Pneumococcal Vaccine 65+  Completed    ZOSTER VACCINE  Completed    COLORECTAL CANCER SCREENING  Discontinued          The following portions of the patient's history were reviewed and updated as appropriate: allergies, current medications, past family history, past medical history, past social history, past surgical history and problem list.    Outpatient Medications Prior to Visit   Medication Sig Dispense Refill    aspirin 81 MG EC tablet Take 1 tablet by mouth Daily.      cetirizine (zyrTEC) 10 MG tablet Take 1 tablet by mouth Every Evening. 90 tablet 3    hydroCHLOROthiazide (HYDRODIURIL) 25 MG tablet Take 1 tablet by mouth Every Evening. 90 tablet 3    Mirabegron ER (MYRBETRIQ) 50 MG tablet sustained-release 24 hour 24 hr tablet Take 50 mg by mouth Every Other Day.      atenolol (TENORMIN) 25 MG tablet TAKE ONE TABLET BY MOUTH TWO TIMES A  tablet 3    lisinopril (PRINIVIL,ZESTRIL) 20 MG tablet Take 1 tablet by mouth Every Evening. 90 tablet 3    montelukast (SINGULAIR) 10 MG tablet Take 1 tablet by mouth Every Night. 90 tablet 3    omeprazole (priLOSEC) 20 MG capsule TAKE ONE CAPSULE BY MOUTH ONCE DAILY 90 capsule 4    rosuvastatin (CRESTOR) 5 MG tablet Take 1 tablet by mouth Daily. 90 tablet 3    meclizine (ANTIVERT) 25 MG tablet Take 1 tablet by mouth 4 (Four) Times a Day. (Patient not taking: Reported on 5/30/2023) 40 tablet 3    Mirabegron ER (MYRBETRIQ) 50 MG tablet sustained-release 24 hour 24 hr tablet Take 50 mg by mouth Every Other Day. (Patient not taking: Reported on 5/30/2023)       No facility-administered medications prior to visit.       Patient Active Problem List   Diagnosis    Chronic nonseasonal  allergic rhinitis due to pollen    Asymptomatic menopause    Cervical disc disorder with myelopathy    Dense breast    Diverticulosis of large intestine without hemorrhage    Gastroesophageal reflux disease with stricture    Generalized osteoarthrosis    Hiatal hernia    History of kidney stones    Benign hypertension with chronic kidney disease    Lumbar disc disorder with myelopathy    Mixed hyperlipidemia    Osteopenia of multiple sites    Overweight with body mass index (BMI) of 26 to 26.9 in adult    Urinary incontinence in female    Medicare annual wellness visit, subsequent    Encounter for screening mammogram for malignant neoplasm of breast    Colon cancer screening    Cervical cancer screening    Elevated coronary artery calcium score    History of 2019 novel coronavirus disease (COVID-19)    Stage 3b chronic kidney disease    Primary osteoarthritis of left shoulder    Adhesive capsulitis of left shoulder    Benign paroxysmal positional vertigo due to bilateral vestibular disorder    Family history of breast cancer       Advanced Care Planning:  ACP discussion was held with the patient during this visit. Patient has an advance directive in EMR which is still valid.     A face-to-face visit was completed today with patient.  Counseling explanation, and discussion of advanced directives was performed.   The last advanced care visit was performed in 2022.  In a near life ending situation, from which she is not expected to recover functionally, and she is not able to speak for herself, she does not want life sustaining measures. We discussed feeding tubes, ventilators and cardiac support as well as dialysis.    I spent more than 16 minutes discussing Advanced Care Planning information and documenting in the chart.      I have reviewed and confirmed the accuracy of the HPI and ROS as documented by the MA/LPN/RN Jenny Keenan MD    Compared to one year ago, the patient feels her physical health is the  "same.  Compared to one year ago, the patient feels her mental health is the same.    Reviewed chart for potential of high risk medication in the elderly: yes  Reviewed chart for potential of harmful drug interactions in the elderly:yes    Objective      Vitals:    05/30/23 0959   BP: 118/70   BP Location: Right arm   Patient Position: Sitting   Cuff Size: Adult   Pulse: 59   Resp: 16   Temp: 97.1 °F (36.2 °C)   TempSrc: Temporal   SpO2: 98%   Weight: 69.9 kg (154 lb 3.2 oz)   Height: 165.1 cm (65\")   PainSc: 0-No pain       Body mass index is 25.66 kg/m².  Discussed the patient's BMI with her. The BMI is in the acceptable range.    Physical Exam  Constitutional:       General: She is not in acute distress.     Appearance: She is well-developed.   HENT:      Head: Normocephalic. Hair is normal.      Right Ear: Tympanic membrane and external ear normal.      Left Ear: Tympanic membrane and external ear normal.      Nose: Nose normal. No mucosal edema.      Mouth/Throat:      Pharynx: Uvula midline.   Eyes:      General:         Right eye: No discharge.         Left eye: No discharge.      Conjunctiva/sclera: Conjunctivae normal.      Pupils: Pupils are equal, round, and reactive to light.   Neck:      Thyroid: No thyromegaly.      Vascular: No JVD.   Cardiovascular:      Rate and Rhythm: Normal rate and regular rhythm.      Chest Wall: PMI is not displaced.      Pulses:           Carotid pulses are 2+ on the right side and 2+ on the left side.       Femoral pulses are 2+ on the right side and 2+ on the left side.       Dorsalis pedis pulses are 2+ on the right side and 2+ on the left side.      Heart sounds: Normal heart sounds. No murmur heard.    No friction rub. No gallop.      Comments: Negative Homans' no edema  Pulmonary:      Effort: Pulmonary effort is normal. No respiratory distress.      Breath sounds: Normal breath sounds. No decreased breath sounds, wheezing, rhonchi or rales.   Chest:   Breasts:     " Breasts are symmetrical.      Right: No inverted nipple, mass, nipple discharge, skin change or tenderness.      Left: No inverted nipple, mass, nipple discharge, skin change or tenderness.   Abdominal:      General: Bowel sounds are normal. There is no distension or abdominal bruit.      Palpations: Abdomen is soft. There is no mass.      Tenderness: There is no abdominal tenderness.   Musculoskeletal:         General: No tenderness or deformity. Normal range of motion.      Cervical back: Normal range of motion and neck supple. No muscular tenderness.   Lymphadenopathy:      Cervical: No cervical adenopathy.      Upper Body:      Right upper body: No supraclavicular adenopathy.      Left upper body: No supraclavicular adenopathy.   Skin:     General: Skin is warm and dry.      Findings: No ecchymosis, erythema, lesion or rash.   Neurological:      Mental Status: She is alert and oriented to person, place, and time.      Cranial Nerves: No cranial nerve deficit.      Sensory: No sensory deficit.      Motor: No abnormal muscle tone.      Coordination: Coordination normal.      Deep Tendon Reflexes: Reflexes are normal and symmetric. Reflexes normal.   Psychiatric:         Speech: Speech normal.         Behavior: Behavior normal.         Thought Content: Thought content normal. Thought content does not include suicidal ideation.         Cognition and Memory: She does not exhibit impaired recent memory or impaired remote memory.         Judgment: Judgment normal. Judgment is not impulsive.     Assessment    Medicare Risks and Personalized Health Plan  CMS Preventative Services Quick Reference  Cardiovascular risk    The above risks/problems have been discussed with the patient.  Pertinent information has been shared with the patient in the After Visit Summary.  Follow up plans and orders are seen below in the Assessment/Plan Section.    Problem List Items Addressed This Visit          High    Benign hypertension with  chronic kidney disease    Overview     Controlled. She is compliant [facility] meds which are continued.          Relevant Medications    atenolol (TENORMIN) 25 MG tablet    lisinopril (PRINIVIL,ZESTRIL) 20 MG tablet    Stage 3b chronic kidney disease    Overview     She has been followed with nephrology neg work up  Creat 1.17 GFR 43 05/30/2023            Medium    Mixed hyperlipidemia    Overview     Myalgia w/ atorvastatin, resolved off meds. Repeated chol off meds,  values warrant Rx resume rosuvastatin and follow.          Current Assessment & Plan     Lipid abnormalities are worsening.  Pharmacotherapy as ordered.  Lipids will be reassessed in 1 year.         Relevant Medications    rosuvastatin (CRESTOR) 5 MG tablet       Low    Chronic nonseasonal allergic rhinitis due to pollen    Overview     Sxs are controlled on meds.          Relevant Medications    montelukast (SINGULAIR) 10 MG tablet    Dense breast    Overview     Akosua Munguia's 5 year risk of having breast cancer is 3.2%. The average woman at age 76 y.o. has a risk of 2.2% of having breast cancer.  Akosua Munguia's life time risk of having breast cancer up to age 90 is 6.4%. The average woman's chance of having breast cancer up to the age of 90 is 4.4%.    We discussed the risk assessment values with Akosua Munguia, she understands that she is at more than average risk of developing breast cancer at 5 years and over her life time than the average woman of her age. She does not desire to have genetic testing or further work up at this time.              Generalized osteoarthrosis    Overview     Stable with tylenol she avoids ant inflammatories given renal function.         Osteopenia of multiple sites    Overview     Declined Dexa   Continue calcium 1200 mg with vitamin D and wt bearing exercise  -0.8 spine,-0.8 FN,-0.2 hip, 2017   -1.2 Spine, 0.5 FN,--0.8 Hip-2014         Overweight with body mass index (BMI) of 26 to 26.9 in adult    Overview      Healthy diet and regular exercise         Current Assessment & Plan     Patient's (Body mass index is 25.66 kg/m².) indicates that they are overweight with health conditions that include hypertension and dyslipidemias . Weight is improving with lifestyle modifications. BMI is is above average; BMI management plan is completed. We discussed low calorie, low carb based diet program, portion control, and increasing exercise.             Unprioritized    Gastroesophageal reflux disease with stricture    Overview     minimal grade A with distal esophageal stricture dilated to [address] [place] 2018  Sx are fairly well controlled on meds she has some concern of recurrence,resume PPI with GI referral if sxs don't improve.          Relevant Medications    omeprazole (priLOSEC) 20 MG capsule    Urinary incontinence in female    Overview     Stable on myrbetriq,         Medicare annual wellness visit, subsequent - Primary    Overview     Diet exercise breast self exams seat belts, fall risk, sunscreens fall prevention and general safety discussed and encouraged, Previous lab reviewed in detail. We notified her of today's lab         Relevant Orders    POCT urinalysis dipstick, manual (Completed)    Encounter for screening mammogram for malignant neoplasm of breast    Overview     Last mammogram 2022 dense            Relevant Orders    Mammo Screening Digital Tomosynthesis Bilateral With CAD    Colon cancer screening    Overview     Has the papers to send back in to schedule this year.  Last colonoscopy 2018. Repeat          Cervical cancer screening    Overview     Last pap smear 2013 negative  S/p hysterectomy due to ovarian cysts         Family history of breast cancer    Overview     Mother  in 50[s          Other Visit Diagnoses       Hypertension, benign        Relevant Medications    atenolol (TENORMIN) 25 MG tablet    lisinopril (PRINIVIL,ZESTRIL) 20 MG tablet          Follow Up:  Return  in about 1 year (around 5/30/2024).     An After Visit Summary and PPPS were given to the patient.

## 2023-05-30 NOTE — PROGRESS NOTES
Chief Complaint  Medicare Wellness-subsequent, Hyperlipidemia, and Hypertension    Subjective     CC  Problem List  Visit Diagnosis   Encounters  Notes  Medications  Labs  Result Review Imaging  Media    Akosua Munguia presents to [facility] for   Hypertension  This is a chronic problem. The current episode started more than 1 year ago. The problem is unchanged. The problem is controlled. Pertinent negatives include no blurred vision, chest pain, headaches, orthopnea, palpitations, PND or shortness of breath. Risk factors for coronary artery disease include dyslipidemia and post-menopausal state. Current antihypertension treatment includes beta blockers, diuretics and ACE inhibitors. The current treatment provides mild improvement. There are no compliance problems.    Hyperlipidemia  This is a chronic problem. The current episode started more than 1 year ago. The problem is uncontrolled. Recent lipid tests were reviewed and are high. She has no history of diabetes or obesity. Pertinent negatives include no chest pain, focal sensory loss, focal weakness, leg pain, myalgias ( on statin Rx) or shortness of breath. Current antihyperlipidemic treatment includes statins ( She has not tolerated crestor. It resulted in moderate Myalgias. Her cholesrerol however has significantly worsened. ). The current treatment provides mild improvement of lipids. There are no compliance problems.  Risk factors for coronary artery disease include hypertension, dyslipidemia and post-menopausal.       Review of Systems   Constitutional:  Negative for activity change, appetite change, fatigue, fever, unexpected weight gain and unexpected weight loss.   HENT:  Negative for congestion, ear pain, hearing loss, rhinorrhea, sinus pressure, sore throat, swollen glands, trouble swallowing and voice change.    Eyes:  Negative for blurred vision and visual disturbance.   Respiratory:  Negative for apnea,  "cough, shortness of breath and wheezing.    Cardiovascular:  Negative for chest pain, palpitations, orthopnea and PND.   Gastrointestinal:  Negative for abdominal pain, blood in stool, constipation, diarrhea, nausea, vomiting and indigestion.   Endocrine: Negative for cold intolerance, heat intolerance, polydipsia and polyuria.   Genitourinary:  Negative for breast discharge, breast lump, breast pain, dysuria, flank pain, frequency, pelvic pain and vaginal bleeding.   Musculoskeletal:  Positive for arthralgias. Negative for joint swelling and myalgias ( on statin Rx). Back pain: multiple.  Skin:  Negative for rash, skin lesions and wound.   Allergic/Immunologic: Negative for environmental allergies and immunocompromised state.   Neurological:  Negative for dizziness, focal weakness, syncope, weakness, numbness, headache and memory problem.   Hematological:  Negative for adenopathy. Does not bruise/bleed easily.   Psychiatric/Behavioral:  Negative for suicidal ideas and depressed mood. The patient is not nervous/anxious.         Objective   Vital Signs:   /70 (BP Location: Right arm, Patient Position: Sitting, Cuff Size: Adult)   Pulse 59   Temp 97.1 °F (36.2 °C) (Temporal)   Resp 16   Ht 165.1 cm (65\")   Wt 69.9 kg (154 lb 3.2 oz)   SpO2 98% Comment: room air  BMI 25.66 kg/m²     Physical Exam  Constitutional:       General: She is not in acute distress.     Appearance: She is well-developed.   HENT:      Head: Normocephalic. Hair is normal.      Right Ear: Tympanic membrane and external ear normal.      Left Ear: Tympanic membrane and external ear normal.      Nose: Nose normal. No mucosal edema.      Mouth/Throat:      Pharynx: Uvula midline.   Eyes:      General:         Right eye: No discharge.         Left eye: No discharge.      Conjunctiva/sclera: Conjunctivae normal.      Pupils: Pupils are equal, round, and reactive to light.   Neck:      Thyroid: No thyromegaly.      Vascular: No JVD. "   Cardiovascular:      Rate and Rhythm: Normal rate and regular rhythm.      Chest Wall: PMI is not displaced.      Pulses:           Carotid pulses are 2+ on the right side and 2+ on the left side.       Femoral pulses are 2+ on the right side and 2+ on the left side.       Dorsalis pedis pulses are 2+ on the right side and 2+ on the left side.      Heart sounds: Normal heart sounds. No murmur heard.    No friction rub. No gallop.      Comments: Negative Homans' no edema  Pulmonary:      Effort: Pulmonary effort is normal. No respiratory distress.      Breath sounds: Normal breath sounds. No decreased breath sounds, wheezing, rhonchi or rales.   Chest:   Breasts:     Breasts are symmetrical.      Right: No inverted nipple, mass, nipple discharge, skin change or tenderness.      Left: No inverted nipple, mass, nipple discharge, skin change or tenderness.   Abdominal:      General: Bowel sounds are normal. There is no distension or abdominal bruit.      Palpations: Abdomen is soft. There is no mass.      Tenderness: There is no abdominal tenderness.   Musculoskeletal:         General: Deformity (knees and distal fingers, with mild decreased ROM) present. No tenderness.      Cervical back: Normal range of motion and neck supple. No muscular tenderness.   Lymphadenopathy:      Cervical: No cervical adenopathy.      Upper Body:      Right upper body: No supraclavicular adenopathy.      Left upper body: No supraclavicular adenopathy.   Skin:     General: Skin is warm and dry.      Findings: No ecchymosis, erythema, lesion or rash.   Neurological:      Mental Status: She is alert and oriented to person, place, and time.      Cranial Nerves: No cranial nerve deficit.      Sensory: No sensory deficit.      Motor: No abnormal muscle tone.      Coordination: Coordination normal.      Deep Tendon Reflexes: Reflexes are normal and symmetric. Reflexes normal.   Psychiatric:         Speech: Speech normal.         Behavior:  Behavior normal.         Thought Content: Thought content normal. Thought content does not include suicidal ideation.         Cognition and Memory: She does not exhibit impaired recent memory or impaired remote memory.         Judgment: Judgment normal. Judgment is not impulsive.      Result Review :Labs  Result Review  Imaging  Med Tab  Media                 Assessment and Plan CC Problem List  Visit Diagnosis  ROS  Review (Popup)  Health Maintenance  Quality  BestPractice  Medications  SmartSets  SnapShot Encounters  Media  Problem List Items Addressed This Visit        Low    Asymptomatic menopause    Overview     Declined  Last dexa scan 01/19/2017 -0.8 spine, -0.8 FN, -0.2 Hip         Dense breast    Overview     Akosua Munguia's 5 year risk of having breast cancer is 3.2%. The average woman at age 76 y.o. has a risk of 2.2% of having breast cancer.  Akosua Munguia's life time risk of having breast cancer up to age 90 is 6.4%. The average woman's chance of having breast cancer up to the age of 90 is 4.4%.    We discussed the risk assessment values with Akosua Munguia, she understands that she is at more than average risk of developing breast cancer at 5 years and over her life time than the average woman of her age. She does not desire to have genetic testing or further work up at this time.              Encounter for screening mammogram for malignant neoplasm of breast    Overview     Last mammogram 04/11/2022 dense            Colon cancer screening    Overview     Has the papers to send back in to schedule this year.  Last colonoscopy 05/17/2018. Repeat 2023         Cervical cancer screening    Overview     Last pap smear 12/09/2013 negative  S/p hysterectomy due to ovarian cysts            Unprioritized    Chronic nonseasonal allergic rhinitis due to pollen    Overview     Exacerbation resume meds and follow.          Gastroesophageal reflux disease with stricture    Overview     minimal  grade A with distal esophageal stricture dilated to 52 [place] Dr. Casillas 2018  Sx are fairly well controlled on meds she has some concern of recurrence,resume PPI with GI referral if sxs don't improve.          Benign hypertension with chronic kidney disease    Overview     Controlled. She is compliant wth meds which are continued.          Mixed hyperlipidemia    Overview     Myalgia w/ atorvastatin, resolved off meds. Repeated chol off meds,  values warrant Rx resume rosuvastatin and follow.          Overweight with body mass index (BMI) of 26 to 26.9 in adult    Overview     Healthy diet and regular exercise         Urinary incontinence in female    Overview     Stable on myrbetriq,         Medicare annual wellness visit, subsequent - Primary    Overview     Diet exercise breast self exams seat belts, fall risk, sunscreens discussed and encouraged, Previous lab reviewed in detail. We notified her of today's lab         Relevant Orders    POCT urinalysis dipstick, manual (Completed)   Other Visit Diagnoses     Hypertension, benign        Acute seasonal allergic rhinitis due to pollen              Follow Up Instructions Charge Capture  Follow-up Communications  No follow-ups on file.  Patient was given instructions and counseling regarding her condition or for health maintenance advice. Please see specific information pulled into the AVS if appropriate.

## 2023-06-18 PROBLEM — Z80.3 FAMILY HISTORY OF BREAST CANCER: Status: ACTIVE | Noted: 2023-06-18

## 2023-06-18 PROBLEM — N18.32 STAGE 3B CHRONIC KIDNEY DISEASE: Status: ACTIVE | Noted: 2022-03-28

## 2023-06-18 PROBLEM — N18.30 STAGE 3 CHRONIC KIDNEY DISEASE: Status: ACTIVE | Noted: 2022-03-28

## 2023-06-19 NOTE — ASSESSMENT & PLAN NOTE
Patient's (Body mass index is 25.66 kg/m².) indicates that they are overweight with health conditions that include hypertension and dyslipidemias . Weight is improving with lifestyle modifications. BMI is is above average; BMI management plan is completed. We discussed low calorie, low carb based diet program, portion control, and increasing exercise.

## 2023-06-19 NOTE — ASSESSMENT & PLAN NOTE
Lipid abnormalities are worsening.  Pharmacotherapy as ordered.  Lipids will be reassessed in 1 year.

## 2023-10-16 NOTE — ANESTHESIA PREPROCEDURE EVALUATION
Anesthesia Evaluation     Patient summary reviewed and Nursing notes reviewed   NPO Solid Status: > 6 hours  NPO Liquid Status: > 6 hours           Airway   Mallampati: II  TM distance: >3 FB  Neck ROM: full  No difficulty expected  Dental      Pulmonary - negative pulmonary ROS and normal exam    breath sounds clear to auscultation  Cardiovascular - normal exam    ECG reviewed  Rhythm: regular  Rate: normal    (+) hypertension, CAD, hyperlipidemia,       Neuro/Psych  (+) headaches, numbness,    GI/Hepatic/Renal/Endo    (+)  hiatal hernia, GERD,      Musculoskeletal     (+) chronic pain,   Abdominal  - normal exam   Substance History - negative use     OB/GYN          Other   arthritis,                    Anesthesia Plan    ASA 3     general and general with block     (Patient identified; pre-operative vital signs, all relevant labs/studies, complete medical/surgical/anesthetic history, full medication list, full allergy list, and NPO status obtained/reviewed; physical assessment performed; anesthetic options, side effects, potential complications, risks, and benefits discussed; questions answered; written anesthesia consent obtained; patient cleared for procedure; anesthesia machine and equipment checked and functioning)  intravenous induction       Plan discussed with CAA.        CODE STATUS:        Otezla Pregnancy And Lactation Text: This medication is Pregnancy Category C and it isn't known if it is safe during pregnancy. It is unknown if it is excreted in breast milk.

## 2023-11-01 NOTE — TELEPHONE ENCOUNTER
Spoke with jordon referral was sent.   Detail Level: Generalized Quality 47: Advance Care Plan: Advance Care Planning discussed and documented; advance care plan or surrogate decision maker documented in the medical record. Quality 226: Preventive Care And Screening: Tobacco Use: Screening And Cessation Intervention: Patient screened for tobacco use and is an ex/non-smoker

## 2023-11-28 NOTE — PROGRESS NOTES
Answers submitted by the patient for this visit:  Primary Reason for Visit (Submitted on 11/27/2023)  What is the primary reason for your visit?: Cough  Cough Questionnaire (Submitted on 11/27/2023)  Chief Complaint: Cough  Chronicity: new  Onset: 1 to 4 weeks ago  Progression since onset: waxing and waning  Frequency: hourly  Cough characteristics: productive of sputum  chest pain: No  chills: No  ear congestion: No  ear pain: No  fever: No  headaches: No  heartburn: No  hemoptysis: No  myalgias: No  nasal congestion: No  postnasal drip: No  rash: No  rhinorrhea: No  shortness of breath: No  sore throat: No  sweats: No  weight loss: No  wheezing: No  Aggravated by: lying down  Chief Complaint  Cough    Subjective          Akosua is a 77 y.o. female  who presents to [facility]     Patient Care Team:  Jenny Keenan MD as PCP - General  Alexandru Baker MD as Consulting Physician (Nephrology)  Banet, Duane Edward, MD as Consulting Physician (Dermatology)     History of Present Illness  Akosua is here today for a cough    Location: Cough  Quality: productive white cloudy sputum  Severity: moderate  Duration: for 3 weeks  Timing: constant cough for 3-4 days but now intermittent  Context: No ill contacts  She has allergies  She has not been to urgent care or ER.   She has not taken a home COVID test  No recent antibiotic use  Alleviating factors: She took a box of Mucinex DM - states it helped some  Aggravating factors: laying down  Associated Symptoms: no fever, no nasal congestion or sinus pressure  Cough  This is a new problem. The current episode started 1 to 4 weeks ago. The problem has been waxing and waning. The problem occurs hourly. The cough is Productive of sputum. Pertinent negatives include no chest pain, chills, ear congestion, ear pain, fever, headaches, heartburn, hemoptysis, myalgias, nasal congestion, postnasal drip, rash, rhinorrhea, sore throat, shortness of breath, sweats,  weight loss or wheezing. The symptoms are aggravated by lying down. Her past medical history is significant for environmental allergies.       Akosua Munguia  has a past medical history of Acute seasonal allergic rhinitis due to pollen, Asymptomatic menopause, Cervical disc disorder with myelopathy, Chronic renal failure, stage 3 (moderate), Dense breast, Dependent edema, Diverticulosis of large intestine without hemorrhage, Dysphagia, idiopathic, Fibrocystic breast, Gastroesophageal reflux disease with stricture (05/17/2018), Generalized osteoarthrosis, Hiatal hernia (05/17/2018), History of kidney stones (2015), Hypertension, benign (2008), Insomnia, organic, Lumbar disc disorder with myelopathy, Mixed hyperlipidemia (2008), Osteopenia of multiple sites (2014), Overweight, Sciatica, Urinary incontinence in female, and Variants of migraine.      Review of Systems   Constitutional:  Negative for chills, fever and weight loss.   HENT:  Negative for congestion, ear pain, postnasal drip, rhinorrhea, sinus pressure, sinus pain and sore throat.    Respiratory:  Positive for cough. Negative for hemoptysis, shortness of breath and wheezing.    Cardiovascular:  Negative for chest pain.   Gastrointestinal:  Negative for abdominal pain, heartburn, nausea and vomiting.   Musculoskeletal:  Negative for myalgias.   Skin:  Negative for rash.   Allergic/Immunologic: Positive for environmental allergies.   Neurological:  Negative for headaches.        Family History   Problem Relation Age of Onset    Breast cancer Mother 53    Coronary artery disease Father     Lung cancer Brother         Past Surgical History:   Procedure Laterality Date    COLONOSCOPY  05/17/2018    Within Normal Limits, Results: sigmoid polyp, negative for adenoma Dr. Casillas 02/2014 rectal polyp otherwise negative.Cari 2002, normal    ESOPHAGOSCOPY / EGD  05/17/2018    OOPHORECTOMY Left 1986    SHOULDER MANIPULATION Left 09/02/2022    Procedure: SHOULDER  MANIPULATION;  Surgeon: eRyes Michaels MD;  Location: [facility] MA[facility] OR;  Service: Orthopedics;  Laterality: Left;    VAGINAL HYSTERECTOMY  1986        Social History     Socioeconomic History    Marital status:      Spouse name: John   Tobacco Use    Smoking status: Never    Smokeless tobacco: Never    Tobacco comments:     No Smoke Exposure   Vaping Use    Vaping Use: Never used   Substance and Sexual Activity    Alcohol use: Never    Drug use: Never    Sexual activity: Yes     Partners: Male     Birth control/protection: Post-menopausal        Immunization History   Administered Date(s) Administered    COVID-19 (MODERNA) 1st,2nd,3rd Dose Monovalent 02/01/2021, 03/01/2021, 11/18/2021    COVID-19 (PFIZER) Purple Cap Monovalent 10/27/2022    Covid-19 (Pfizer) Gray Cap Monovalent 08/01/2022    FLUAD TRI 65YR+ 11/12/2019    Flu Vaccine Quad PF 6-35MO 12/01/2015    Fluad Quad 65+ 10/13/2023    Fluzone High Dose =>65 Years ([employer] ONLY) 10/17/2016, 10/23/2017, 10/09/2018    Fluzone High-Dose 65+yrs 10/20/2021, 10/18/2022    Hepatitis A 10/09/2018, 04/29/2019    Pneumococcal Conjugate 13-Valent (PCV13) 01/06/2016, 10/10/2018    Pneumococcal Polysaccharide (PPSV23) 10/27/2011    Shingrix 12/18/2018, 08/15/2019    Td (TDVAX) 02/02/2003, 08/19/2008    Tdap 11/05/2014    Zostavax 09/16/2009, 05/02/2012    Zoster, Unspecified 09/16/2009, 05/02/2012       Objective       Current Outpatient Medications:     aspirin 81 MG EC tablet, Take 1 tablet by mouth Daily., Disp: , Rfl:     atenolol (TENORMIN) 25 MG tablet, Take 1 tablet by mouth 2 (Two) Times a Day., Disp: 180 tablet, Rfl: 3    cetirizine (zyrTEC) 10 MG tablet, Take 1 tablet by mouth Every Evening., Disp: 90 tablet, Rfl: 3    hydroCHLOROthiazide (HYDRODIURIL) 25 MG tablet, Take 1 tablet by mouth Every Evening., Disp: 90 tablet, Rfl: 3    lisinopril (PRINIVIL,ZESTRIL) 20 MG tablet, Take 1 tablet by mouth Every Evening., Disp: 90 tablet, Rfl: 3    Mirabegron  "ER (MYRBETRIQ) 50 MG tablet sustained-release 24 hour 24 hr tablet, Take 50 mg by mouth Every Other Day., Disp: , Rfl:     montelukast (SINGULAIR) 10 MG tablet, Take 1 tablet by mouth Every Night., Disp: 90 tablet, Rfl: 3    omeprazole (priLOSEC) 20 MG capsule, Take 1 capsule by mouth Daily., Disp: 90 capsule, Rfl: 4    rosuvastatin (CRESTOR) 5 MG tablet, Take 1 tablet by mouth Daily., Disp: 90 tablet, Rfl: 3    albuterol sulfate  (90 Base) MCG/ACT inhaler, 1 to 2 puffs inhalation every 4-6 hours as needed for cough and shortness of breath, Disp: 18 g, Rfl: 0    benzonatate (Tessalon Perles) 100 MG capsule, Take 1 capsule by mouth Every 8 (Eight) Hours As Needed for Cough., Disp: 30 capsule, Rfl: 0    cephalexin (Keflex) 500 MG capsule, Take 1 capsule by mouth Every 8 (Eight) Hours for 10 days., Disp: 30 capsule, Rfl: 0    Vital Signs:      /83 (BP Location: Left arm, Patient Position: Sitting, Cuff Size: Adult)   Pulse 54   Resp 18   Ht 165.1 cm (65\")   Wt 74.4 kg (164 lb)   LMP 01/01/1986   SpO2 98%   BMI 27.29 kg/m²     Vitals:    11/29/23 1629   BP: 159/83   BP Location: Left arm   Patient Position: Sitting   Cuff Size: Adult   Pulse: 54   Resp: 18   SpO2: 98%   Weight: 74.4 kg (164 lb)   Height: 165.1 cm (65\")      Physical Exam  Vitals reviewed.   Constitutional:       General: She is not in acute distress.     Appearance: Normal appearance. She is not ill-appearing or toxic-appearing.   HENT:      Head: Normocephalic and atraumatic.      Right Ear: Tympanic membrane, ear canal and external ear normal.      Left Ear: Tympanic membrane, ear canal and external ear normal.      Nose: Nose normal.      Mouth/Throat:      Mouth: Mucous membranes are moist.      Pharynx: Oropharynx is clear. No oropharyngeal exudate or posterior oropharyngeal erythema.   Eyes:      General: No scleral icterus.     Conjunctiva/sclera: Conjunctivae normal.   Cardiovascular:      Rate and Rhythm: Normal rate and " regular rhythm.      Heart sounds: Normal heart sounds.   Pulmonary:      Effort: Pulmonary effort is normal. No respiratory distress.      Breath sounds: Normal breath sounds. No wheezing or rhonchi.   Musculoskeletal:      Cervical back: Neck supple.      Right lower leg: No edema.      Left lower leg: No edema.   Lymphadenopathy:      Cervical: No cervical adenopathy.   Skin:     General: Skin is warm and dry.   Neurological:      Mental Status: She is alert and oriented to person, place, and time.   Psychiatric:         Mood and Affect: Mood normal.        Result Review :   The following data was reviewed by: MK Victoria on 11/29/2023:             Office Visit on 11/29/2023   Component Date Value Ref Range Status    SARS Antigen 11/29/2023 Not Detected  Not Detected, Presumptive Negative Final    Influenza A Antigen BRITTANY 11/29/2023 Not Detected  Not Detected Final    Influenza B Antigen BRITTANY 11/29/2023 Not Detected  Not Detected Final    Internal Control 11/29/2023 Passed  Passed Final    Lot Number 11/29/2023 [id number]   Final    Expiration Date 11/29/2023 102,724   Final            Assessment and Plan    Diagnoses and all orders for this visit:    1. Acute purulent bronchitis (Primary)  -     albuterol sulfate  (90 Base) MCG/ACT inhaler; 1 to 2 puffs inhalation every 4-6 hours as needed for cough and shortness of breath  Dispense: 18 g; Refill: 0  -     cephalexin (Keflex) 500 MG capsule; Take 1 capsule by mouth Every 8 (Eight) Hours for 10 days.  Dispense: 30 capsule; Refill: 0    2. Chronic nonseasonal allergic rhinitis due to pollen  Assessment & Plan:  Continue Zyrtec and Singulair daily      3. Acute cough  -     benzonatate (Tessalon Perles) 100 MG capsule; Take 1 capsule by mouth Every 8 (Eight) Hours As Needed for Cough.  Dispense: 30 capsule; Refill: 0  -     POCT SARS-CoV-2 + Flu Antigen BRITTANY       Begin antibiotic, albuterol inhaler and tessalon perles.  Stay well hydrated.  Follow-up  5-7 days for reevaluation if not improved or sooner if needed.       Follow Up   Return if symptoms worsen or fail to improve.  Patient was given instructions and counseling regarding her condition or for health maintenance advice. Please see specific information pulled into the AVS if appropriate.    There are no Patient Instructions on file for this visit.

## 2023-11-29 ENCOUNTER — OFFICE VISIT (OUTPATIENT)
Dept: FAMILY MEDICINE CLINIC | Facility: CLINIC | Age: 78
End: 2023-11-29
Payer: MEDICARE

## 2023-11-29 VITALS
WEIGHT: 164 LBS | HEIGHT: 65 IN | HEART RATE: 54 BPM | BODY MASS INDEX: 27.32 KG/M2 | OXYGEN SATURATION: 98 % | SYSTOLIC BLOOD PRESSURE: 159 MMHG | DIASTOLIC BLOOD PRESSURE: 83 MMHG | RESPIRATION RATE: 18 BRPM

## 2023-11-29 DIAGNOSIS — J20.9 ACUTE PURULENT BRONCHITIS: Primary | ICD-10-CM

## 2023-11-29 DIAGNOSIS — R05.1 ACUTE COUGH: ICD-10-CM

## 2023-11-29 DIAGNOSIS — J30.89 CHRONIC NONSEASONAL ALLERGIC RHINITIS DUE TO POLLEN: ICD-10-CM

## 2023-11-29 LAB
EXPIRATION DATE: NORMAL
FLUAV AG UPPER RESP QL IA.RAPID: NOT DETECTED
FLUBV AG UPPER RESP QL IA.RAPID: NOT DETECTED
INTERNAL CONTROL: NORMAL
Lab: NORMAL
SARS-COV-2 AG UPPER RESP QL IA.RAPID: NOT DETECTED

## 2023-11-29 PROCEDURE — 3077F SYST BP >= 140 MM HG: CPT | Performed by: NURSE PRACTITIONER

## 2023-11-29 PROCEDURE — 99213 OFFICE O/P EST LOW 20 MIN: CPT | Performed by: NURSE PRACTITIONER

## 2023-11-29 PROCEDURE — 3079F DIAST BP 80-89 MM HG: CPT | Performed by: NURSE PRACTITIONER

## 2023-11-29 PROCEDURE — 1160F RVW MEDS BY RX/DR IN RCRD: CPT | Performed by: NURSE PRACTITIONER

## 2023-11-29 PROCEDURE — 1159F MED LIST DOCD IN RCRD: CPT | Performed by: NURSE PRACTITIONER

## 2023-11-29 PROCEDURE — 87428 SARSCOV & INF VIR A&B AG IA: CPT | Performed by: NURSE PRACTITIONER

## 2023-11-29 RX ORDER — ALBUTEROL SULFATE 90 UG/1
AEROSOL, METERED RESPIRATORY (INHALATION)
Qty: 18 G | Refills: 0 | Status: SHIPPED | OUTPATIENT
Start: 2023-11-29

## 2023-11-29 RX ORDER — CEPHALEXIN 500 MG/1
500 CAPSULE ORAL EVERY 8 HOURS
Qty: 30 CAPSULE | Refills: 0 | Status: SHIPPED | OUTPATIENT
Start: 2023-11-29 | End: 2023-12-09

## 2023-11-29 RX ORDER — BENZONATATE 100 MG/1
100 CAPSULE ORAL EVERY 8 HOURS PRN
Qty: 30 CAPSULE | Refills: 0 | Status: SHIPPED | OUTPATIENT
Start: 2023-11-29

## 2024-04-12 ENCOUNTER — TELEPHONE (OUTPATIENT)
Dept: FAMILY MEDICINE CLINIC | Facility: CLINIC | Age: 79
End: 2024-04-12
Payer: MEDICARE

## 2024-04-12 DIAGNOSIS — I12.9 BENIGN HYPERTENSION WITH CHRONIC KIDNEY DISEASE: ICD-10-CM

## 2024-04-12 DIAGNOSIS — R73.9 HYPERGLYCEMIA: ICD-10-CM

## 2024-04-12 DIAGNOSIS — E78.2 MIXED HYPERLIPIDEMIA: Primary | ICD-10-CM

## 2024-04-12 NOTE — TELEPHONE ENCOUNTER
Caller: Akosua Munguia    Relationship: Self    Best call back number: [phone]    What orders are you requesting (i.e. lab or imaging): LABS FOR WELLNESS VISIT 5/31    Additional notes: PLEASE CALL PATIENT WHEN ORDERS PLACED

## 2024-04-15 NOTE — TELEPHONE ENCOUNTER
Patient has been called and notified of her labs being ordered and she can get them done when it is best for her and she needs to be fasting

## 2024-05-05 DIAGNOSIS — I10 HYPERTENSION, BENIGN: ICD-10-CM

## 2024-05-06 RX ORDER — HYDROCHLOROTHIAZIDE 25 MG/1
25 TABLET ORAL EVERY EVENING
Qty: 90 TABLET | Refills: 3 | Status: SHIPPED | OUTPATIENT
Start: 2024-05-06

## 2024-05-06 RX ORDER — CETIRIZINE HYDROCHLORIDE 10 MG/1
10 TABLET ORAL EVERY EVENING
Qty: 90 TABLET | Refills: 3 | Status: SHIPPED | OUTPATIENT
Start: 2024-05-06

## 2024-05-24 LAB
ALBUMIN SERPL-MCNC: 4.5 G/DL (ref 3.8–4.8)
ALBUMIN/GLOB SERPL: 1.9 {RATIO} (ref 1.2–2.2)
ALP SERPL-CCNC: 59 IU/L (ref 44–121)
ALT SERPL-CCNC: 17 IU/L (ref 0–32)
AST SERPL-CCNC: 25 IU/L (ref 0–40)
BASOPHILS # BLD AUTO: 0 X10E3/UL (ref 0–0.2)
BASOPHILS NFR BLD AUTO: 0 %
BILIRUB SERPL-MCNC: 0.4 MG/DL (ref 0–1.2)
BUN SERPL-MCNC: 27 MG/DL (ref 8–27)
BUN/CREAT SERPL: 23 (ref 12–28)
CALCIUM SERPL-MCNC: 9.7 MG/DL (ref 8.7–10.3)
CHLORIDE SERPL-SCNC: 102 MMOL/L (ref 96–106)
CHOLEST SERPL-MCNC: 150 MG/DL (ref 100–199)
CHOLEST/HDLC SERPL: 2.6 RATIO (ref 0–4.4)
CO2 SERPL-SCNC: 26 MMOL/L (ref 20–29)
CREAT SERPL-MCNC: 1.18 MG/DL (ref 0.57–1)
EGFRCR SERPLBLD CKD-EPI 2021: 47 ML/MIN/1.73
EOSINOPHIL # BLD AUTO: 0.2 X10E3/UL (ref 0–0.4)
EOSINOPHIL NFR BLD AUTO: 3 %
ERYTHROCYTE [DISTWIDTH] IN BLOOD BY AUTOMATED COUNT: 12.9 % (ref 11.7–15.4)
GLOBULIN SER CALC-MCNC: 2.4 G/DL (ref 1.5–4.5)
GLUCOSE SERPL-MCNC: 107 MG/DL (ref 70–99)
HBA1C MFR BLD: 6.1 % (ref 4.8–5.6)
HCT VFR BLD AUTO: 35.9 % (ref 34–46.6)
HDLC SERPL-MCNC: 57 MG/DL
HGB BLD-MCNC: 13.3 G/DL (ref 11.1–15.9)
IMM GRANULOCYTES # BLD AUTO: 0 X10E3/UL (ref 0–0.1)
IMM GRANULOCYTES NFR BLD AUTO: 0 %
LDLC SERPL CALC-MCNC: 73 MG/DL (ref 0–99)
LYMPHOCYTES # BLD AUTO: 1.6 X10E3/UL (ref 0.7–3.1)
LYMPHOCYTES NFR BLD AUTO: 31 %
MCH RBC QN AUTO: 35.7 PG (ref 26.6–33)
MCHC RBC AUTO-ENTMCNC: 37 G/DL (ref 31.5–35.7)
MCV RBC AUTO: 96 FL (ref 79–97)
MONOCYTES # BLD AUTO: 0.6 X10E3/UL (ref 0.1–0.9)
MONOCYTES NFR BLD AUTO: 12 %
MORPHOLOGY BLD-IMP: ABNORMAL
NEUTROPHILS # BLD AUTO: 2.8 X10E3/UL (ref 1.4–7)
NEUTROPHILS NFR BLD AUTO: 54 %
PLATELET # BLD AUTO: 201 X10E3/UL (ref 150–450)
POTASSIUM SERPL-SCNC: 3.8 MMOL/L (ref 3.5–5.2)
PROT SERPL-MCNC: 6.9 G/DL (ref 6–8.5)
RBC # BLD AUTO: 3.73 X10E6/UL (ref 3.77–5.28)
SODIUM SERPL-SCNC: 141 MMOL/L (ref 134–144)
TRIGL SERPL-MCNC: 114 MG/DL (ref 0–149)
TSH SERPL DL<=0.005 MIU/L-ACNC: 1.82 UIU/ML (ref 0.45–4.5)
VLDLC SERPL CALC-MCNC: 20 MG/DL (ref 5–40)
WBC # BLD AUTO: 5.2 X10E3/UL (ref 3.4–10.8)

## 2024-05-25 LAB — VIT B12 SERPL-MCNC: 294 PG/ML (ref 232–1245)

## 2024-05-27 DIAGNOSIS — I10 HYPERTENSION, BENIGN: ICD-10-CM

## 2024-05-27 DIAGNOSIS — J30.89 CHRONIC NONSEASONAL ALLERGIC RHINITIS DUE TO POLLEN: ICD-10-CM

## 2024-05-28 LAB
METHYLMALONATE SERPL-SCNC: 298 NMOL/L (ref 0–378)
SPECIMEN STATUS: NORMAL

## 2024-05-28 RX ORDER — MONTELUKAST SODIUM 10 MG/1
10 TABLET ORAL NIGHTLY
Qty: 30 TABLET | Refills: 0 | Status: SHIPPED | OUTPATIENT
Start: 2024-05-28

## 2024-05-28 RX ORDER — LISINOPRIL 20 MG/1
20 TABLET ORAL EVERY EVENING
Qty: 30 TABLET | Refills: 0 | Status: SHIPPED | OUTPATIENT
Start: 2024-05-28

## 2024-06-07 NOTE — PROGRESS NOTES
Chief Complaint  Medicare Wellness-subsequent, Prediabetes, and Hyperlipidemia    Subjective     CC  Problem List  Visit Diagnosis   Encounters  Notes  Medications  Labs  Result Review Imaging  Media    Akosua Munguia presents to [facility] for   Blood Sugar Problem  This is a new (A1C 6.1 on 5/23/2024) problem. The current episode started 1 to 4 weeks ago. The problem occurs constantly. The problem has been unchanged. Pertinent negatives include no abdominal pain, arthralgias, chest pain, congestion, coughing, fatigue, fever, headaches, joint swelling, myalgias, nausea, neck pain, numbness, rash, sore throat, swollen glands, vomiting or weakness. The symptoms are aggravated by eating. She has tried nothing for the symptoms.   Hypertension  This is a chronic problem. The current episode started more than 1 year ago. The problem has been stable since onset. The problem is controlled. Pertinent negatives include no anxiety, blurred vision, chest pain, headaches, malaise/fatigue, neck pain, orthopnea, palpitations, peripheral edema, PND, shortness of breath or sweats. There are no associated agents to hypertension. Risk factors for coronary artery disease include post-menopausal state and dyslipidemia (pre diabetes). Past treatments include nothing. Current antihypertension treatment includes diuretics, ACE inhibitors and beta blockers. The current treatment provides mild improvement. There are no compliance problems.  Hypertensive end-organ damage includes kidney disease. Identifiable causes of hypertension include chronic renal disease.   Hyperlipidemia  This is a chronic problem. The current episode started more than 1 year ago. The problem is controlled. Exacerbating diseases include chronic renal disease and diabetes. She has no history of hypothyroidism, liver disease, obesity or nephrotic syndrome. Factors aggravating her hyperlipidemia include fatty foods. Pertinent  negatives include no chest pain, myalgias or shortness of breath. Current antihyperlipidemic treatment includes statins. The current treatment provides mild improvement of lipids. There are no compliance problems.  Risk factors for coronary artery disease include dyslipidemia, hypertension and post-menopausal.   Chronic Kidney Disease  This is a chronic (Follows with Dr. Baker) problem. The current episode started more than 1 year ago. The problem has been unchanged. Pertinent negatives include no abdominal pain, arthralgias, chest pain, congestion, coughing, fatigue, fever, headaches, joint swelling, myalgias, nausea, neck pain, numbness, rash, sore throat, swollen glands, vomiting or weakness. Nothing aggravates the symptoms. Treatments tried: Myrbetriq. The treatment provided moderate relief.       Review of Systems   Constitutional:  Negative for activity change, appetite change, fatigue, fever, malaise/fatigue, unexpected weight gain and unexpected weight loss.   HENT:  Negative for congestion, ear pain, hearing loss, rhinorrhea, sinus pressure, sore throat, swollen glands, trouble swallowing and voice change.    Eyes:  Negative for blurred vision and visual disturbance.   Respiratory:  Negative for apnea, cough, shortness of breath and wheezing.    Cardiovascular:  Negative for chest pain, palpitations, orthopnea and PND.   Gastrointestinal:  Negative for abdominal pain, blood in stool, constipation, diarrhea, nausea, vomiting and indigestion.   Endocrine: Negative for cold intolerance, heat intolerance, polydipsia and polyuria.   Genitourinary:  Negative for breast discharge, breast lump, breast pain, dysuria, flank pain, frequency, pelvic pain and vaginal bleeding.   Musculoskeletal:  Negative for arthralgias, joint swelling, myalgias and neck pain.   Skin:  Negative for rash, skin lesions and wound.   Allergic/Immunologic: Negative for environmental allergies and immunocompromised state.   Neurological:   "Negative for dizziness, syncope, weakness, numbness, headache and memory problem.   Hematological:  Negative for adenopathy. Does not bruise/bleed easily.   Psychiatric/Behavioral:  Negative for suicidal ideas and depressed mood. The patient is not nervous/anxious.         Objective   Vital Signs:   /72 (BP Location: Right arm, Patient Position: Sitting, Cuff Size: Adult)   Pulse 56   Temp 97.3 °F (36.3 °C) (Temporal)   Resp 18   Ht 165.1 cm (65\")   Wt 71.3 kg (157 lb 2 oz)   SpO2 97% Comment: room air  BMI 26.15 kg/m²     Physical Exam  Constitutional:       General: She is not in acute distress.     Appearance: She is well-developed.   HENT:      Head: Normocephalic. Hair is normal.      Right Ear: Tympanic membrane and external ear normal.      Left Ear: Tympanic membrane and external ear normal.      Nose: Nose normal. No mucosal edema.      Mouth/Throat:      Pharynx: Uvula midline.   Eyes:      General:         Right eye: No discharge.         Left eye: No discharge.      Conjunctiva/sclera: Conjunctivae normal.      Pupils: Pupils are equal, round, and reactive to light.   Neck:      Thyroid: No thyromegaly.      Vascular: No JVD.   Cardiovascular:      Rate and Rhythm: Normal rate and regular rhythm.      Chest Wall: PMI is not displaced.      Pulses:           Carotid pulses are 2+ on the right side and 2+ on the left side.       Femoral pulses are 2+ on the right side and 2+ on the left side.       Dorsalis pedis pulses are 2+ on the right side and 2+ on the left side.      Heart sounds: Normal heart sounds. No murmur heard.     No friction rub. No gallop.      Comments: Negative Homans' no edema  Pulmonary:      Effort: Pulmonary effort is normal. No respiratory distress.      Breath sounds: Normal breath sounds. No decreased breath sounds, wheezing, rhonchi or rales.   Chest:   Breasts:     Breasts are symmetrical.      Right: No inverted nipple, mass, nipple discharge, skin change or " tenderness.      Left: No inverted nipple, mass, nipple discharge, skin change or tenderness.   Abdominal:      General: Bowel sounds are normal. There is no distension or abdominal bruit.      Palpations: Abdomen is soft. There is no mass.      Tenderness: There is no abdominal tenderness.   Musculoskeletal:         General: No tenderness. Deformity: knees and distal fingers..     Cervical back: Normal range of motion and neck supple. No muscular tenderness.   Lymphadenopathy:      Cervical: No cervical adenopathy.      Upper Body:      Right upper body: No supraclavicular adenopathy.      Left upper body: No supraclavicular adenopathy.   Skin:     General: Skin is warm and dry.      Findings: No ecchymosis, erythema, lesion or rash.   Neurological:      Mental Status: She is alert and oriented to person, place, and time.      Cranial Nerves: No cranial nerve deficit.      Sensory: No sensory deficit.      Motor: No abnormal muscle tone.      Coordination: Coordination normal.      Deep Tendon Reflexes: Reflexes are normal and symmetric. Reflexes normal.   Psychiatric:         Speech: Speech normal.         Behavior: Behavior normal.         Thought Content: Thought content normal. Thought content does not include suicidal ideation.         Cognition and Memory: She does not exhibit impaired recent memory or impaired remote memory.         Judgment: Judgment normal. Judgment is not impulsive.        Result Review :Labs  Result Review  Imaging  Med Tab  Media                 Assessment and Plan CC Problem List  Visit Diagnosis  ROS  Review (Popup)  Health Maintenance  Quality  BestPractice  Medications  SmartSets  SnapShot Encounters  Media  Problem List Items Addressed This Visit          High    Benign hypertension with chronic kidney disease    Overview     Controlled. She is compliant [facility] meds which are continued.          Current Assessment & Plan     Hypertension is stable and  controlled  Continue current treatment regimen.  Blood pressure will be reassessed in 6 months.         Relevant Medications    atenolol (TENORMIN) 25 MG tablet    hydroCHLOROthiazide 25 MG tablet    lisinopril (PRINIVIL,ZESTRIL) 20 MG tablet    Stage 3b chronic kidney disease    Overview     She has been followed with nephrology neg work up  Creat 1.18 GFR 47 06/11/2024 stable  Creat 1.17 GFR 48 05/30/2023         Relevant Medications    hydroCHLOROthiazide 25 MG tablet    Prediabetes    Overview     A1c 6.1 06/11/2024  Importance of diet and regular exercise discussed, understood, f.u 3 mos.             Medium    Mixed hyperlipidemia    Overview     Myalgia w/ atorvastatin, resolved off meds. Repeated chol off meds,  values warrant Rx resume rosuvastatin and follow.          Current Assessment & Plan      Lipid abnormalities are stable    Plan:  Continue same medication/s without change.      Discussed medication dosage, use, side effects, and goals of treatment in detail.    Counseled patient on lifestyle modifications to help control hyperlipidemia.     Patient Treatment Goals:   LDL goal is less than 70    Followup in 6 months.         Relevant Medications    rosuvastatin (CRESTOR) 5 MG tablet       Low    Chronic nonseasonal allergic rhinitis due to pollen    Overview     Sxs are controlled on meds which are continued          Relevant Medications    montelukast (SINGULAIR) 10 MG tablet    Dense breast    Overview     Akosua Munguia's 5 year risk of having breast cancer is 3.2%. The average woman at age 76 y.o. has a risk of 2.2% of having breast cancer.  Akosua Munguia's life time risk of having breast cancer up to age 90 is 6.4%. The average woman's chance of having breast cancer up to the age of 90 is 4.4%.    We discussed the risk assessment values with Akosua Munguia, she understands that she is at more than average risk of developing breast cancer at 5 years and over her life time than the average woman  of her age. She does not desire to have genetic testing or further work up at this time.              Generalized osteoarthrosis    Overview     Stable with tylenol she avoids ant inflammatories given renal function.         Lumbar disc disorder with myelopathy    Overview     Sxs are controlled. She practices good back mechanics. Ice and tylenol. No anti inflammatories given CKD, she understands.          Osteopenia of multiple sites    Overview     Declined Dexa   Continue calcium 1200 mg with vitamin D and wt bearing exercise  -0.8 spine,-0.8 FN,-0.2 hip, 2017 .  -1.2 Spine, 0.5 FN,--0.8 Hip-2014         Relevant Orders    DEXA Bone Density Axial    Overweight with body mass index (BMI) of 26 to 26.9 in adult    Overview     Healthy diet and regular exercise         Current Assessment & Plan     Patient's (Body mass index is 26.15 kg/m².) indicates that they are overweight with health conditions that include hypertension, coronary heart disease, dyslipidemias, and osteoarthritis . Weight is unchanged. BMI is is above average; BMI management plan is completed. We discussed portion control and increasing exercise.             Unprioritized    Asymptomatic menopause    Overview     Declined  Last dexa scan 01/19/2017 -0.8 spine, -0.8 FN, -0.2 Hip         Gastroesophageal reflux disease with stricture    Overview     minimal grade A with distal esophageal stricture dilated to [address] [place] Casillas 2018  Sx are fairly well controlled on meds she has some concern of recurrence,resume PPI with GI referral if sxs don't improve.          Relevant Medications    omeprazole (priLOSEC) 20 MG capsule    Medicare annual wellness visit, subsequent - Primary    Overview     Diet exercise breast self exams seat belts, fall risk, sunscreens fall prevention and general safety discussed and encouraged, Previous lab reviewed in detail. We notified her of today's lab         Relevant Orders    POCT urinalysis dipstick, manual  (Completed)    Encounter for screening mammogram for malignant neoplasm of breast    Overview     Last mammogram 2023 heterogeneously dense, no mammographic signs of malignancy repeat in 1 year            Relevant Orders    Mammo Screening Digital Tomosynthesis Bilateral With CAD    Colon cancer screening    Overview     Last colonoscopy 2018 bx was negative no recall.         Cervical cancer screening    Overview     Last pap smear 2013 negative  S/p hysterectomy due to ovarian cysts         Family history of breast cancer    Overview     Mother  in 50[s          Other Visit Diagnoses       Hypertension, benign        Relevant Medications    atenolol (TENORMIN) 25 MG tablet    hydroCHLOROthiazide 25 MG tablet    lisinopril (PRINIVIL,ZESTRIL) 20 MG tablet    Hyperglycemia        Urinary frequency        Relevant Orders    Urinalysis With Microscopic - Urine, Clean Catch (Completed)              Follow Up Instructions Charge Capture  Follow-up Communications  Return in about 3 months (around 2024).  Patient was given instructions and counseling regarding her condition or for health maintenance advice. Please see specific information pulled into the AVS if appropriate.

## 2024-06-11 ENCOUNTER — OFFICE VISIT (OUTPATIENT)
Dept: FAMILY MEDICINE CLINIC | Facility: CLINIC | Age: 79
End: 2024-06-11
Payer: MEDICARE

## 2024-06-11 VITALS
OXYGEN SATURATION: 97 % | TEMPERATURE: 97.3 F | WEIGHT: 157.13 LBS | DIASTOLIC BLOOD PRESSURE: 72 MMHG | HEIGHT: 65 IN | SYSTOLIC BLOOD PRESSURE: 128 MMHG | HEART RATE: 56 BPM | RESPIRATION RATE: 18 BRPM | BODY MASS INDEX: 26.18 KG/M2

## 2024-06-11 DIAGNOSIS — Z12.4 CERVICAL CANCER SCREENING: ICD-10-CM

## 2024-06-11 DIAGNOSIS — K22.2 GASTROESOPHAGEAL REFLUX DISEASE WITH STRICTURE: ICD-10-CM

## 2024-06-11 DIAGNOSIS — M15.9 GENERALIZED OSTEOARTHROSIS: ICD-10-CM

## 2024-06-11 DIAGNOSIS — I12.9 BENIGN HYPERTENSION WITH CHRONIC KIDNEY DISEASE: ICD-10-CM

## 2024-06-11 DIAGNOSIS — E78.2 MIXED HYPERLIPIDEMIA: ICD-10-CM

## 2024-06-11 DIAGNOSIS — R73.9 HYPERGLYCEMIA: ICD-10-CM

## 2024-06-11 DIAGNOSIS — R35.0 URINARY FREQUENCY: ICD-10-CM

## 2024-06-11 DIAGNOSIS — Z12.31 ENCOUNTER FOR SCREENING MAMMOGRAM FOR MALIGNANT NEOPLASM OF BREAST: ICD-10-CM

## 2024-06-11 DIAGNOSIS — Z78.0 ASYMPTOMATIC MENOPAUSE: ICD-10-CM

## 2024-06-11 DIAGNOSIS — Z00.00 MEDICARE ANNUAL WELLNESS VISIT, SUBSEQUENT: Primary | ICD-10-CM

## 2024-06-11 DIAGNOSIS — I10 HYPERTENSION, BENIGN: ICD-10-CM

## 2024-06-11 DIAGNOSIS — N18.32 STAGE 3B CHRONIC KIDNEY DISEASE: ICD-10-CM

## 2024-06-11 DIAGNOSIS — Z80.3 FAMILY HISTORY OF BREAST CANCER: ICD-10-CM

## 2024-06-11 DIAGNOSIS — K21.9 GASTROESOPHAGEAL REFLUX DISEASE WITH STRICTURE: ICD-10-CM

## 2024-06-11 DIAGNOSIS — M85.89 OSTEOPENIA OF MULTIPLE SITES: ICD-10-CM

## 2024-06-11 DIAGNOSIS — R73.03 PREDIABETES: ICD-10-CM

## 2024-06-11 DIAGNOSIS — R92.30 DENSE BREAST: ICD-10-CM

## 2024-06-11 DIAGNOSIS — J30.89 CHRONIC NONSEASONAL ALLERGIC RHINITIS DUE TO POLLEN: ICD-10-CM

## 2024-06-11 DIAGNOSIS — E66.3 OVERWEIGHT WITH BODY MASS INDEX (BMI) OF 26 TO 26.9 IN ADULT: ICD-10-CM

## 2024-06-11 DIAGNOSIS — Z12.11 COLON CANCER SCREENING: ICD-10-CM

## 2024-06-11 DIAGNOSIS — M51.06 LUMBAR DISC DISORDER WITH MYELOPATHY: ICD-10-CM

## 2024-06-11 LAB
BILIRUB BLD-MCNC: NEGATIVE MG/DL
CLARITY, POC: CLEAR
COLOR UR: YELLOW
GLUCOSE UR STRIP-MCNC: NEGATIVE MG/DL
KETONES UR QL: NEGATIVE
LEUKOCYTE EST, POC: NEGATIVE
NITRITE UR-MCNC: NEGATIVE MG/ML
PH UR: 6 [PH] (ref 5–8)
PROT UR STRIP-MCNC: NEGATIVE MG/DL
RBC # UR STRIP: ABNORMAL /UL
SP GR UR: 1.02 (ref 1–1.03)
UROBILINOGEN UR QL: NORMAL

## 2024-06-11 PROCEDURE — G0444 DEPRESSION SCREEN ANNUAL: HCPCS | Performed by: FAMILY MEDICINE

## 2024-06-11 PROCEDURE — 1126F AMNT PAIN NOTED NONE PRSNT: CPT | Performed by: FAMILY MEDICINE

## 2024-06-11 PROCEDURE — G0439 PPPS, SUBSEQ VISIT: HCPCS | Performed by: FAMILY MEDICINE

## 2024-06-11 PROCEDURE — 1159F MED LIST DOCD IN RCRD: CPT | Performed by: FAMILY MEDICINE

## 2024-06-11 PROCEDURE — 1160F RVW MEDS BY RX/DR IN RCRD: CPT | Performed by: FAMILY MEDICINE

## 2024-06-11 PROCEDURE — 81002 URINALYSIS NONAUTO W/O SCOPE: CPT | Performed by: FAMILY MEDICINE

## 2024-06-11 PROCEDURE — 3078F DIAST BP <80 MM HG: CPT | Performed by: FAMILY MEDICINE

## 2024-06-11 PROCEDURE — 1158F ADVNC CARE PLAN TLK DOCD: CPT | Performed by: FAMILY MEDICINE

## 2024-06-11 PROCEDURE — 99214 OFFICE O/P EST MOD 30 MIN: CPT | Performed by: FAMILY MEDICINE

## 2024-06-11 PROCEDURE — 3074F SYST BP LT 130 MM HG: CPT | Performed by: FAMILY MEDICINE

## 2024-06-11 RX ORDER — ATENOLOL 25 MG/1
25 TABLET ORAL 2 TIMES DAILY
Qty: 180 TABLET | Refills: 3 | Status: SHIPPED | OUTPATIENT
Start: 2024-06-11

## 2024-06-11 RX ORDER — LISINOPRIL 20 MG/1
20 TABLET ORAL EVERY EVENING
Qty: 30 TABLET | Refills: 0 | Status: SHIPPED | OUTPATIENT
Start: 2024-06-11

## 2024-06-11 RX ORDER — MONTELUKAST SODIUM 10 MG/1
10 TABLET ORAL NIGHTLY
Qty: 30 TABLET | Refills: 0 | Status: SHIPPED | OUTPATIENT
Start: 2024-06-11

## 2024-06-11 RX ORDER — OMEPRAZOLE 20 MG/1
20 CAPSULE, DELAYED RELEASE ORAL DAILY
Qty: 90 CAPSULE | Refills: 4 | Status: SHIPPED | OUTPATIENT
Start: 2024-06-11

## 2024-06-11 RX ORDER — ROSUVASTATIN CALCIUM 5 MG/1
5 TABLET, COATED ORAL DAILY
Qty: 90 TABLET | Refills: 3 | Status: SHIPPED | OUTPATIENT
Start: 2024-06-11

## 2024-06-11 RX ORDER — HYDROCHLOROTHIAZIDE 25 MG/1
25 TABLET ORAL EVERY EVENING
Qty: 90 TABLET | Refills: 3 | Status: SHIPPED | OUTPATIENT
Start: 2024-06-11

## 2024-06-11 NOTE — ASSESSMENT & PLAN NOTE
Patient's (Body mass index is 26.15 kg/m².) indicates that they are overweight with health conditions that include hypertension, coronary heart disease, dyslipidemias, and osteoarthritis . Weight is unchanged. BMI is is above average; BMI management plan is completed. We discussed portion control and increasing exercise.

## 2024-06-11 NOTE — PROGRESS NOTES
The ABCs of the Annual Wellness Visit  Subsequent Medicare Wellness Visit      Subjective   History of Present Illness:  Akosau Munguia is a 78 y.o. female who presents for a Subsequent Medicare Wellness Visit. The patient is here: for coordination of medical care to discuss health maintenance and disease prevention to follow up on multiple medical problems. Overall has: moderate activity with work/home activities, exercises 2 - 3 times per week, good appetite, feels well with minor complaints, decreased energy level, and shown improvement in their activity level. Nutrition: appropriate balanced diet and eating a variety of foods. Last tetanus shot was  11/05/2014 .    The following portions of the patient's history were reviewed and   updated as appropriate: allergies, current medications, past family history, past medical history, past social history, past surgical history, and problem list.    Compared to one year ago, the patient feels her physical   health is the same.    Compared to one year ago, the patient feels her mental   health is the same.    Recent Hospitalizations:  She was not admitted to the hospital during the last year.       Current Medical Providers:  Patient Care Team:  Jenny Keenan MD as PCP - General  Alexandru Baker MD as Consulting Physician (Nephrology)  Banet, Duane Edward, MD as Consulting Physician (Dermatology)    Outpatient Medications Prior to Visit   Medication Sig Dispense Refill    aspirin 81 MG EC tablet Take 1 tablet by mouth Daily.      cetirizine (zyrTEC) 10 MG tablet Take 1 tablet by mouth Every Evening. 90 tablet 3    Mirabegron ER (MYRBETRIQ) 50 MG tablet sustained-release 24 hour 24 hr tablet Take 50 mg by mouth Every Other Day.      atenolol (TENORMIN) 25 MG tablet Take 1 tablet by mouth 2 (Two) Times a Day. 180 tablet 3    hydroCHLOROthiazide 25 MG tablet TAKE 1 TABLET BY MOUTH EVERY EVENING 90 tablet 3    lisinopril (PRINIVIL,ZESTRIL) 20 MG tablet Take 1 tablet by mouth  Every Evening. 30 tablet 0    montelukast (SINGULAIR) 10 MG tablet Take 1 tablet by mouth Every Night. 30 tablet 0    omeprazole (priLOSEC) 20 MG capsule Take 1 capsule by mouth Daily. 90 capsule 4    rosuvastatin (CRESTOR) 5 MG tablet Take 1 tablet by mouth Daily. 90 tablet 3    albuterol sulfate  (90 Base) MCG/ACT inhaler 1 to 2 puffs inhalation every 4-6 hours as needed for cough and shortness of breath 18 g 0    benzonatate (Tessalon Perles) 100 MG capsule Take 1 capsule by mouth Every 8 (Eight) Hours As Needed for Cough. (Patient not taking: Reported on 6/11/2024) 30 capsule 0     No facility-administered medications prior to visit.       No opioid medication identified on active medication list. I have reviewed chart for other potential  high risk medication/s and harmful drug interactions in the elderly.        Aspirin is on active medication list. Aspirin use is indicated based on review of current medical condition/s. Pros and cons of this therapy have been discussed today. Benefits of this medication outweigh potential harm.  Patient has been encouraged to continue taking this medication.  .      Patient Active Problem List   Diagnosis    Chronic nonseasonal allergic rhinitis due to pollen    Asymptomatic menopause    Cervical disc disorder with myelopathy    Dense breast    Diverticulosis of large intestine without hemorrhage    Gastroesophageal reflux disease with stricture    Generalized osteoarthrosis    Hiatal hernia    History of kidney stones    Benign hypertension with chronic kidney disease    Lumbar disc disorder with myelopathy    Mixed hyperlipidemia    Osteopenia of multiple sites    Overweight with body mass index (BMI) of 26 to 26.9 in adult    Urinary incontinence in female    Medicare annual wellness visit, subsequent    Encounter for screening mammogram for malignant neoplasm of breast    Colon cancer screening    Cervical cancer screening    Elevated coronary artery calcium score  "   History of 2019 novel coronavirus disease (COVID-19)    Stage 3b chronic kidney disease    Primary osteoarthritis of left shoulder    Adhesive capsulitis of left shoulder    Benign paroxysmal positional vertigo due to bilateral vestibular disorder    Family history of breast cancer    Prediabetes     Advance Directive is on file.  ACP discussion was held with the patient during this visit. Patient has an advance directive in EMR which is still valid.      Objective    Vitals:    24 1233   BP: 128/72   BP Location: Right arm   Patient Position: Sitting   Cuff Size: Adult   Pulse: 56   Resp: 18   Temp: 97.3 °F (36.3 °C)   TempSrc: Temporal   SpO2: 97%  Comment: room air   Weight: 71.3 kg (157 lb 2 oz)   Height: 165.1 cm (65\")   PainSc: 0-No pain     Estimated body mass index is 26.15 kg/m² as calculated from the following:    Height as of this encounter: 165.1 cm (65\").    Weight as of this encounter: 71.3 kg (157 lb 2 oz).    BMI is >= 25 and <30. (Overweight) The following options were offered after discussion;: exercise counseling/recommendations      Does the patient have evidence of cognitive impairment?   No    Lab Results   Component Value Date    CHLPL 150 2024    TRIG 114 2024    HDL 57 2024    LDL 73 2024    VLDL 20 2024    HGBA1C 6.1 (H) 2024          HEALTH RISK ASSESSMENT    Smoking Status:  Social History     Tobacco Use   Smoking Status Never   Smokeless Tobacco Never   Tobacco Comments    No Smoke Exposure     Alcohol Consumption:  Social History     Substance and Sexual Activity   Alcohol Use Never     Fall Risk Screen:    STEADI Fall Risk Assessment was completed, and patient is at LOW risk for falls.Assessment completed on:2024    Depression Screenin/11/2024    12:36 PM   PHQ-2/PHQ-9 Depression Screening   Little Interest or Pleasure in Doing Things 0-->not at all   Feeling Down, Depressed or Hopeless 0-->not at all   PHQ-9: Brief Depression " Severity Measure Score 0     I spent more than 16 minutes asking patient questions, counseling and documenting in the chart    Health Habits and Functional and Cognitive Screenin/11/2024    12:00 PM   Functional & Cognitive Status   Do you have difficulty preparing food and eating? No   Do you have difficulty bathing yourself, getting dressed or grooming yourself? No   Do you have difficulty using the toilet? No   Do you have difficulty moving around from place to place? No   Do you have trouble with steps or getting out of a bed or a chair? No   Current Diet Well Balanced Diet   Dental Exam Up to date   Eye Exam Up to date   Exercise (times per week) 3 times per week   Current Exercises Include House Cleaning   Do you need help using the phone?  No   Are you deaf or do you have serious difficulty hearing?  No   Do you need help to go to places out of walking distance? No   Do you need help shopping? No   Do you need help preparing meals?  No   Do you need help with housework?  No   Do you need help with laundry? No   Do you need help taking your medications? No   Do you need help managing money? No   Do you ever drive or ride in a car without wearing a seat belt? No   Have you felt unusual stress, anger or loneliness in the last month? No   Who do you live with? Spouse   If you need help, do you have trouble finding someone available to you? No   Have you been bothered in the last four weeks by sexual problems? No   Do you have difficulty concentrating, remembering or making decisions? No       Age-appropriate Screening Schedule:  Refer to the list below for future screening recommendations based on patient's age, sex and/or medical conditions. Orders for these recommended tests are listed in the plan section. The patient has been provided with a written plan.    Health Maintenance   Topic Date Due    DXA SCAN  2019    COVID-19 Vaccine ( season) 2023    INFLUENZA VACCINE  2024     TDAP/TD VACCINES (4 - Td or Tdap) 11/05/2024    LIPID PANEL  05/23/2025    ANNUAL WELLNESS VISIT  06/11/2025    BMI FOLLOWUP  06/11/2025    HEPATITIS C SCREENING  Completed    RSV Vaccine - Adults  Completed    Pneumococcal Vaccine 65+  Completed    ZOSTER VACCINE  Completed    COLORECTAL CANCER SCREENING  Discontinued                  CMS Preventative Services Quick Reference  Risk Factors Identified During Encounter:    Cardiovascular risk, her risk factors are modified.     The above risks/problems have been discussed with the patient.  Pertinent information has been shared with the patient in the After Visit Summary.    Diagnoses and all orders for this visit:    1. Medicare annual wellness visit, subsequent (Primary)  -     POCT urinalysis dipstick, manual    2. Hypertension, benign  -     atenolol (TENORMIN) 25 MG tablet; Take 1 tablet by mouth 2 (Two) Times a Day.  Dispense: 180 tablet; Refill: 3  -     hydroCHLOROthiazide 25 MG tablet; Take 1 tablet by mouth Every Evening.  Dispense: 90 tablet; Refill: 3  -     lisinopril (PRINIVIL,ZESTRIL) 20 MG tablet; Take 1 tablet by mouth Every Evening.  Dispense: 30 tablet; Refill: 0    3. Prediabetes    4. Hyperglycemia    5. Stage 3b chronic kidney disease    6. Mixed hyperlipidemia  Assessment & Plan:   Lipid abnormalities are stable    Plan:  Continue same medication/s without change.      Discussed medication dosage, use, side effects, and goals of treatment in detail.    Counseled patient on lifestyle modifications to help control hyperlipidemia.     Patient Treatment Goals:   LDL goal is less than 70    Followup in 6 months.    Orders:  -     rosuvastatin (CRESTOR) 5 MG tablet; Take 1 tablet by mouth Daily.  Dispense: 90 tablet; Refill: 3    7. Chronic nonseasonal allergic rhinitis due to pollen  -     montelukast (SINGULAIR) 10 MG tablet; Take 1 tablet by mouth Every Night.  Dispense: 30 tablet; Refill: 0    8. Benign hypertension with chronic kidney  disease  Assessment & Plan:  Hypertension is stable and controlled  Continue current treatment regimen.  Blood pressure will be reassessed in 6 months.      9. Gastroesophageal reflux disease with stricture  -     omeprazole (priLOSEC) 20 MG capsule; Take 1 capsule by mouth Daily.  Dispense: 90 capsule; Refill: 4    10. Osteopenia of multiple sites  -     DEXA Bone Density Axial    11. Overweight with body mass index (BMI) of 26 to 26.9 in adult  Assessment & Plan:  Patient's (Body mass index is 26.15 kg/m².) indicates that they are overweight with health conditions that include hypertension, coronary heart disease, dyslipidemias, and osteoarthritis . Weight is unchanged. BMI is is above average; BMI management plan is completed. We discussed portion control and increasing exercise.       12. Urinary frequency  -     Urinalysis With Microscopic - Urine, Clean Catch    13. Generalized osteoarthrosis    14. Lumbar disc disorder with myelopathy    15. Family history of breast cancer    16. Dense breast    17. Encounter for screening mammogram for malignant neoplasm of breast  -     Mammo Screening Digital Tomosynthesis Bilateral With CAD    18. Colon cancer screening    19. Asymptomatic menopause    20. Cervical cancer screening    Other orders  -     Microscopic Examination -        Follow Up:   Next Medicare Wellness visit to be scheduled in 1 year.      An After Visit Summary and PPPS were made available to the patient.            Outpatient Medications Prior to Visit   Medication Sig Dispense Refill    aspirin 81 MG EC tablet Take 1 tablet by mouth Daily.      cetirizine (zyrTEC) 10 MG tablet Take 1 tablet by mouth Every Evening. 90 tablet 3    Mirabegron ER (MYRBETRIQ) 50 MG tablet sustained-release 24 hour 24 hr tablet Take 50 mg by mouth Every Other Day.      atenolol (TENORMIN) 25 MG tablet Take 1 tablet by mouth 2 (Two) Times a Day. 180 tablet 3    hydroCHLOROthiazide 25 MG tablet TAKE 1 TABLET BY MOUTH EVERY  EVENING 90 tablet 3    lisinopril (PRINIVIL,ZESTRIL) 20 MG tablet Take 1 tablet by mouth Every Evening. 30 tablet 0    montelukast (SINGULAIR) 10 MG tablet Take 1 tablet by mouth Every Night. 30 tablet 0    omeprazole (priLOSEC) 20 MG capsule Take 1 capsule by mouth Daily. 90 capsule 4    rosuvastatin (CRESTOR) 5 MG tablet Take 1 tablet by mouth Daily. 90 tablet 3    albuterol sulfate  (90 Base) MCG/ACT inhaler 1 to 2 puffs inhalation every 4-6 hours as needed for cough and shortness of breath 18 g 0    benzonatate (Tessalon Perles) 100 MG capsule Take 1 capsule by mouth Every 8 (Eight) Hours As Needed for Cough. (Patient not taking: Reported on 6/11/2024) 30 capsule 0     No facility-administered medications prior to visit.       Patient Active Problem List   Diagnosis    Chronic nonseasonal allergic rhinitis due to pollen    Asymptomatic menopause    Cervical disc disorder with myelopathy    Dense breast    Diverticulosis of large intestine without hemorrhage    Gastroesophageal reflux disease with stricture    Generalized osteoarthrosis    Hiatal hernia    History of kidney stones    Benign hypertension with chronic kidney disease    Lumbar disc disorder with myelopathy    Mixed hyperlipidemia    Osteopenia of multiple sites    Overweight with body mass index (BMI) of 26 to 26.9 in adult    Urinary incontinence in female    Medicare annual wellness visit, subsequent    Encounter for screening mammogram for malignant neoplasm of breast    Colon cancer screening    Cervical cancer screening    Elevated coronary artery calcium score    History of 2019 novel coronavirus disease (COVID-19)    Stage 3b chronic kidney disease    Primary osteoarthritis of left shoulder    Adhesive capsulitis of left shoulder    Benign paroxysmal positional vertigo due to bilateral vestibular disorder    Family history of breast cancer    Prediabetes       Advanced Care Planning:  ACP discussion was held with the patient during  "this visit. Patient has an advance directive (not in EMR), copy requested.    A face-to-face visit was completed today with patient.  Counseling explanation, and discussion of advanced directives was performed.   The last advanced care visit was performed in 2023.  In a near life ending situation, from which she is not expected to recover functionally, and she is not able to speak for herself, she does not want life sustaining measures. We discussed feeding tubes, ventilators and cardiac support as well as dialysis.  Her daughter is a doc and will aide in decision making.   I spent more than 16 minutes discussing Advanced Care Planning information and documenting in the chart.    Review of Systems    I have reviewed and confirmed the accuracy of the HPI and ROS as documented by the MA/LPN/RN Jenny Keenan MD      Reviewed chart for potential of high risk medication in the elderly: yes  Reviewed chart for potential of harmful drug interactions in the elderly:yes    Objective      Vitals:    06/11/24 1233   BP: 128/72   BP Location: Right arm   Patient Position: Sitting   Cuff Size: Adult   Pulse: 56   Resp: 18   Temp: 97.3 °F (36.3 °C)   TempSrc: Temporal   SpO2: 97%   Weight: 71.3 kg (157 lb 2 oz)   Height: 165.1 cm (65\")   PainSc: 0-No pain       Body mass index is 26.15 kg/m².  Discussed the patient's BMI with her. The BMI is above average; BMI management plan is completed.      An After Visit Summary and PPPS were given to the patient.                "

## 2024-06-12 LAB
APPEARANCE UR: CLEAR
BACTERIA #/AREA URNS HPF: NORMAL /[HPF]
BILIRUB UR QL STRIP: NEGATIVE
CASTS URNS QL MICRO: NORMAL /LPF
COLOR UR: YELLOW
EPI CELLS #/AREA URNS HPF: NORMAL /HPF (ref 0–10)
GLUCOSE UR QL STRIP: NEGATIVE
HGB UR QL STRIP: NEGATIVE
KETONES UR QL STRIP: NEGATIVE
LEUKOCYTE ESTERASE UR QL STRIP: NEGATIVE
MICRO URNS: NORMAL
MICRO URNS: NORMAL
NITRITE UR QL STRIP: NEGATIVE
PH UR STRIP: 6.5 [PH] (ref 5–7.5)
PROT UR QL STRIP: NEGATIVE
RBC #/AREA URNS HPF: NORMAL /HPF (ref 0–2)
SP GR UR STRIP: 1.02 (ref 1–1.03)
UROBILINOGEN UR STRIP-MCNC: 0.2 MG/DL (ref 0.2–1)
WBC #/AREA URNS HPF: NORMAL /HPF (ref 0–5)

## 2024-06-12 NOTE — PROGRESS NOTES
Your Policy Manager message was sent   Good morning we have your Urine back and per Dr. Keenan   Your urine microscopic has come back and there was no red blood cells seen in the urine. This is good as this is the most accurate way to see if there is blood in the urine and it has come back normal.

## 2024-06-19 PROBLEM — R73.03 PREDIABETES: Status: ACTIVE | Noted: 2024-06-19

## 2024-06-25 ENCOUNTER — HOSPITAL ENCOUNTER (OUTPATIENT)
Dept: BONE DENSITY | Facility: HOSPITAL | Age: 79
Discharge: HOME | End: 2024-06-25
Payer: MEDICARE

## 2024-06-25 ENCOUNTER — HOSPITAL ENCOUNTER (OUTPATIENT)
Dept: MAMMOGRAPHY | Facility: HOSPITAL | Age: 79
Discharge: HOME | End: 2024-06-25
Payer: MEDICARE

## 2024-06-25 PROCEDURE — 77067 SCR MAMMO BI INCL CAD: CPT

## 2024-06-25 PROCEDURE — 77063 BREAST TOMOSYNTHESIS BI: CPT

## 2024-06-25 PROCEDURE — 77080 DXA BONE DENSITY AXIAL: CPT

## 2024-07-08 DIAGNOSIS — E78.2 MIXED HYPERLIPIDEMIA: ICD-10-CM

## 2024-07-08 RX ORDER — ROSUVASTATIN CALCIUM 5 MG/1
5 TABLET, COATED ORAL DAILY
Qty: 90 TABLET | Refills: 3 | Status: SHIPPED | OUTPATIENT
Start: 2024-07-08

## 2024-08-04 DIAGNOSIS — I10 HYPERTENSION, BENIGN: ICD-10-CM

## 2024-08-04 DIAGNOSIS — J30.89 CHRONIC NONSEASONAL ALLERGIC RHINITIS DUE TO POLLEN: ICD-10-CM

## 2024-08-05 RX ORDER — LISINOPRIL 20 MG/1
20 TABLET ORAL EVERY EVENING
Qty: 30 TABLET | Refills: 12 | Status: SHIPPED | OUTPATIENT
Start: 2024-08-05

## 2024-08-05 RX ORDER — MONTELUKAST SODIUM 10 MG/1
10 TABLET ORAL NIGHTLY
Qty: 30 TABLET | Refills: 12 | Status: SHIPPED | OUTPATIENT
Start: 2024-08-05

## 2024-08-09 RX ORDER — CETIRIZINE HYDROCHLORIDE 10 MG/1
10 TABLET ORAL EVERY EVENING
Qty: 90 TABLET | Refills: 3 | Status: SHIPPED | OUTPATIENT
Start: 2024-08-09

## 2024-08-09 NOTE — TELEPHONE ENCOUNTER
Caller: Akosua Munguia    Relationship: Self    Best call back number: [phone]     Requested Prescriptions:   Requested Prescriptions     Pending Prescriptions Disp Refills    cetirizine (zyrTEC) 10 MG tablet 90 tablet 3     Sig: Take 1 tablet by mouth Every Evening.        Pharmacy where request should be sent: [facility] [id number] [address] - [phone]  - [phone]     Last office visit with prescribing clinician: 6/11/2024   Last telemedicine visit with prescribing clinician: Visit date not found   Next office visit with prescribing clinician: Visit date not found     Additional details provided by patient: PATIENT IS COMPLETELY OUT    Does the patient have less than a 3 day supply:  [x] Yes  [] No    Would you like a call back once the refill request has been completed: [] Yes [] No    If the office needs to give you a call back, can they leave a voicemail: [] Yes [] No    Nadiya Knight Rep   08/09/24 14:10 EDT

## 2025-01-31 ENCOUNTER — OFFICE VISIT (OUTPATIENT)
Dept: FAMILY MEDICINE CLINIC | Facility: CLINIC | Age: 80
End: 2025-01-31
Payer: MEDICARE

## 2025-01-31 VITALS
SYSTOLIC BLOOD PRESSURE: 128 MMHG | OXYGEN SATURATION: 96 % | TEMPERATURE: 98 F | BODY MASS INDEX: 27.16 KG/M2 | DIASTOLIC BLOOD PRESSURE: 66 MMHG | RESPIRATION RATE: 18 BRPM | WEIGHT: 163 LBS | HEIGHT: 65 IN | HEART RATE: 62 BPM

## 2025-01-31 DIAGNOSIS — R73.03 PREDIABETES: Primary | ICD-10-CM

## 2025-01-31 DIAGNOSIS — E53.8 VITAMIN B12 DEFICIENCY: ICD-10-CM

## 2025-01-31 DIAGNOSIS — E78.2 MIXED HYPERLIPIDEMIA: ICD-10-CM

## 2025-01-31 DIAGNOSIS — R44.9 ABNORMAL MOUTH SENSATION: ICD-10-CM

## 2025-01-31 DIAGNOSIS — E66.3 OVERWEIGHT (BMI 25.0-29.9): ICD-10-CM

## 2025-01-31 DIAGNOSIS — R73.9 HYPERGLYCEMIA: ICD-10-CM

## 2025-01-31 LAB
EXPIRATION DATE: ABNORMAL
HBA1C MFR BLD: 6.1 % (ref 4.5–5.7)
Lab: ABNORMAL

## 2025-01-31 RX ORDER — LANOLIN ALCOHOL/MO/W.PET/CERES
1000 CREAM (GRAM) TOPICAL DAILY
Qty: 30 TABLET | Refills: 11 | Status: SHIPPED | OUTPATIENT
Start: 2025-01-31

## 2025-01-31 NOTE — ASSESSMENT & PLAN NOTE
Patient's (Body mass index is 27.12 kg/m².) indicates that they are overweight with health conditions that include hypertension, dyslipidemias, GERD, and osteoarthritis . Weight is unchanged. BMI is above average; BMI management plan is completed. We discussed portion control and increasing exercise.

## 2025-01-31 NOTE — PROGRESS NOTES
"Chief Complaint  Establish Care, Prediabetes, and Mouth Burning    Subjective          Akosua Munguia presents to [facility] for     HPI  Establish care, former patient of Dr. Keenan.     Prediabetes  Patient reports is following low concentrated sweets diet.   Patient reports are not checking blood sugar at home.   Patient reports are exercising.   Last A1c was 6.1 on 5/23/2024.     Burning in mouth and around lips present for 2 months off and on. Symptoms present with anything acidic.   Patient reports that she has had dry mouth and that her lips are always chapped.   Has tried salivea.    Objective   Vital Signs:   /66 (BP Location: Right arm, Patient Position: Sitting, Cuff Size: Adult)   Pulse 62   Temp 98 °F (36.7 °C) (Temporal)   Resp 18   Ht 165.1 cm (65\")   Wt 73.9 kg (163 lb)   SpO2 96% Comment: room air  BMI 27.12 kg/m²     Physical Exam  Vitals and nursing note reviewed.   Constitutional:       General: She is not in acute distress.     Appearance: Normal appearance. She is not ill-appearing or diaphoretic.   HENT:      Head: Normocephalic and atraumatic.      Nose: No congestion or rhinorrhea.   Eyes:      Extraocular Movements: Extraocular movements intact.      Pupils: Pupils are equal, round, and reactive to light.   Cardiovascular:      Rate and Rhythm: Normal rate and regular rhythm.      Pulses: Normal pulses.   Pulmonary:      Effort: Pulmonary effort is normal.      Breath sounds: Normal breath sounds.   Musculoskeletal:         General: Normal range of motion.      Cervical back: Normal range of motion and neck supple.   Skin:     General: Skin is warm and dry.      Capillary Refill: Capillary refill takes less than 2 seconds.   Neurological:      Mental Status: She is alert and oriented to person, place, and time.      Sensory: No sensory deficit.      Motor: No weakness.   Psychiatric:         Mood and Affect: Mood normal.         Behavior: " Behavior normal.        Result Review :                 Assessment and Plan    Diagnoses and all orders for this visit:    1. Prediabetes (Primary)  Overview:  A1c 6.1 01/31/2025 stable  A1c 6.1 06/11/2024    Counseled on diet, exercise, and weight loss, with specific dietary interventions discussed in detail.    Orders:  -     POC Glycosylated Hemoglobin (Hb A1C)    2. Hyperglycemia  -     POC Glycosylated Hemoglobin (Hb A1C)    3. Overweight (BMI 25.0-29.9)  Overview:  Healthy diet and regular exercise    Assessment & Plan:  Patient's (Body mass index is 27.12 kg/m².) indicates that they are overweight with health conditions that include hypertension, dyslipidemias, GERD, and osteoarthritis . Weight is unchanged. BMI is above average; BMI management plan is completed. We discussed portion control and increasing exercise.       4. Mixed hyperlipidemia  Overview:  Regimen: Rosuvastatin 5 mg qd    Lab Results   Component Value Date    CHOL 216 (H) 01/28/2019    CHLPL 150 05/23/2024    TRIG 114 05/23/2024    HDL 57 05/23/2024    LDL 73 05/23/2024     Controlled. Compliant. Continue current regimen.      Myalgia w/ atorvastatin.      5. Abnormal mouth sensation  Overview:  Started around 11/2024. Involves both bilateral lips and mouth. Patient notably had a borderline B12 <300 last year. We will recheck it as well as folate and get her started on a B12 supplement. Further evaluation if no improvement.    Orders:  -     vitamin B-12 (CYANOCOBALAMIN) 1000 MCG tablet; Take 1 tablet by mouth Daily.  Dispense: 30 tablet; Refill: 11  -     Folate  -     Vitamin B12  -     Comprehensive metabolic panel    6. Vitamin B12 deficiency  -     vitamin B-12 (CYANOCOBALAMIN) 1000 MCG tablet; Take 1 tablet by mouth Daily.  Dispense: 30 tablet; Refill: 11  -     Vitamin B12           Lucien Crane MD    NOTE: [employer] per Health Information accessibility laws, allows progress notes to be visible to patients. Please note that these  notes will include professional medical terminology that may be somewhat confusing without some interpretation from your medical professional team. The intent of progress notes is to communicate information between any medical professionals involved in your case, or to serve as future reference for myself or any other provider when reviewing your case, as well as a reference for the patient viewing the record. Please ask a member of the medical team if you have any questions about terminology or content of note.    DISCLAIMER: Part of this note may be an electronic transcription/translation of spoken language to printed text using the Dragon Dictation System.

## 2025-02-01 LAB
ALBUMIN SERPL-MCNC: 4.6 G/DL (ref 3.8–4.8)
ALP SERPL-CCNC: 66 IU/L (ref 44–121)
ALT SERPL-CCNC: 19 IU/L (ref 0–32)
AST SERPL-CCNC: 23 IU/L (ref 0–40)
BILIRUB SERPL-MCNC: 0.3 MG/DL (ref 0–1.2)
BUN SERPL-MCNC: 25 MG/DL (ref 8–27)
BUN/CREAT SERPL: 22 (ref 12–28)
CALCIUM SERPL-MCNC: 9.7 MG/DL (ref 8.7–10.3)
CHLORIDE SERPL-SCNC: 101 MMOL/L (ref 96–106)
CO2 SERPL-SCNC: 25 MMOL/L (ref 20–29)
CREAT SERPL-MCNC: 1.14 MG/DL (ref 0.57–1)
EGFRCR SERPLBLD CKD-EPI 2021: 49 ML/MIN/1.73
FOLATE SERPL-MCNC: 9 NG/ML
GLOBULIN SER CALC-MCNC: 2.2 G/DL (ref 1.5–4.5)
GLUCOSE SERPL-MCNC: 101 MG/DL (ref 70–99)
POTASSIUM SERPL-SCNC: 3.9 MMOL/L (ref 3.5–5.2)
PROT SERPL-MCNC: 6.8 G/DL (ref 6–8.5)
SODIUM SERPL-SCNC: 140 MMOL/L (ref 134–144)
VIT B12 SERPL-MCNC: 331 PG/ML (ref 232–1245)

## 2025-02-02 PROBLEM — R44.9 ABNORMAL MOUTH SENSATION: Status: ACTIVE | Noted: 2025-02-02

## 2025-05-01 ENCOUNTER — OFFICE VISIT (OUTPATIENT)
Dept: FAMILY MEDICINE CLINIC | Facility: CLINIC | Age: 80
End: 2025-05-01
Payer: MEDICARE

## 2025-05-01 VITALS
WEIGHT: 160.2 LBS | OXYGEN SATURATION: 96 % | HEIGHT: 65 IN | BODY MASS INDEX: 26.69 KG/M2 | DIASTOLIC BLOOD PRESSURE: 76 MMHG | RESPIRATION RATE: 16 BRPM | SYSTOLIC BLOOD PRESSURE: 124 MMHG | HEART RATE: 65 BPM | TEMPERATURE: 98.9 F

## 2025-05-01 DIAGNOSIS — R01.1 SYSTOLIC MURMUR: ICD-10-CM

## 2025-05-01 DIAGNOSIS — R50.9 FEVER, UNSPECIFIED FEVER CAUSE: Primary | ICD-10-CM

## 2025-05-01 DIAGNOSIS — J06.9 UPPER RESPIRATORY TRACT INFECTION, UNSPECIFIED TYPE: ICD-10-CM

## 2025-05-01 DIAGNOSIS — K14.0 GLOSSITIS: ICD-10-CM

## 2025-05-01 RX ORDER — AZITHROMYCIN 250 MG/1
TABLET, FILM COATED ORAL
Qty: 6 TABLET | Refills: 0 | Status: SHIPPED | OUTPATIENT
Start: 2025-05-01

## 2025-05-01 RX ORDER — NYSTATIN 100000 [USP'U]/ML
500000 SUSPENSION ORAL 4 TIMES DAILY
Qty: 280 ML | Refills: 0 | Status: SHIPPED | OUTPATIENT
Start: 2025-05-01

## 2025-05-01 NOTE — PROGRESS NOTES
Subjective   Akosua Munguia is a 79 y.o. female.   Chief Complaint   Patient presents with    URI     History of Present Illness  Headache, nausea, cough minimally productive  URI   This is a new problem. The current episode started in the past 7 days. The problem has been unchanged. The maximum temperature recorded prior to her arrival was 100.4 - 100.9 F. The fever has been present for 1 to 2 days. Associated symptoms include congestion and coughing. Pertinent negatives include no abdominal pain, chest pain, diarrhea, dysuria, ear pain, nausea, neck pain, rash, swollen glands, vomiting or wheezing.      Still with sore mouth x 8 months or so. Not improved thus far with b12 supplementation    Incidentally pt says at last specialist appt had low heart rate       The following portions of the patient's history were reviewed and updated as appropriate: allergies, current medications, past family history, past medical history, past social history, past surgical history, and problem list.    Patient Active Problem List   Diagnosis    Chronic nonseasonal allergic rhinitis due to pollen    Asymptomatic menopause    Cervical disc disorder with myelopathy    Dense breast    Diverticulosis of large intestine without hemorrhage    Gastroesophageal reflux disease with stricture    Generalized osteoarthrosis    Hiatal hernia    History of kidney stones    Benign hypertension with chronic kidney disease    Lumbar disc disorder with myelopathy    Mixed hyperlipidemia    Osteopenia of multiple sites    Overweight (BMI 25.0-29.9)    Urinary incontinence in female    Medicare annual wellness visit, subsequent    Encounter for screening mammogram for malignant neoplasm of breast    Colon cancer screening    Cervical cancer screening    Elevated coronary artery calcium score    History of 2019 novel coronavirus disease (COVID-19)    Stage 3b chronic kidney disease    Primary osteoarthritis of left shoulder    Adhesive capsulitis of  left shoulder    Benign paroxysmal positional vertigo due to bilateral vestibular disorder    Family history of breast cancer    Prediabetes    Vitamin B12 deficiency    Abnormal mouth sensation       Current Outpatient Medications on File Prior to Visit   Medication Sig Dispense Refill    aspirin 81 MG EC tablet Take 1 tablet by mouth Daily.      atenolol (TENORMIN) 25 MG tablet Take 1 tablet by mouth 2 (Two) Times a Day. 180 tablet 3    cetirizine (zyrTEC) 10 MG tablet Take 1 tablet by mouth Every Evening. 90 tablet 3    hydroCHLOROthiazide 25 MG tablet Take 1 tablet by mouth Every Evening. 90 tablet 3    lisinopril (PRINIVIL,ZESTRIL) 20 MG tablet TAKE ONE TABLET BY MOUTH EVERY EVENING 30 tablet 12    Mirabegron ER (MYRBETRIQ) 50 MG tablet sustained-release 24 hour 24 hr tablet Take 50 mg by mouth Every Other Day.      montelukast (SINGULAIR) 10 MG tablet TAKE ONE TABLET BY MOUTH ONCE NIGHTLY 30 tablet 12    omeprazole (priLOSEC) 20 MG capsule Take 1 capsule by mouth Daily. 90 capsule 4    rosuvastatin (CRESTOR) 5 MG tablet Take 1 tablet by mouth Daily. 90 tablet 3    vitamin B-12 (CYANOCOBALAMIN) 1000 MCG tablet Take 1 tablet by mouth Daily. 30 tablet 11     No current facility-administered medications on file prior to visit.     Current outpatient and discharge medications have been reconciled for the patient.  Reviewed by: Isac Long MD      No Known Allergies    Review of Systems   Constitutional:  Negative for activity change, appetite change, fatigue and fever.   HENT:  Positive for congestion. Negative for ear pain, swollen glands and voice change.    Eyes:  Negative for visual disturbance.   Respiratory:  Positive for cough. Negative for shortness of breath and wheezing.    Cardiovascular:  Negative for chest pain and leg swelling.   Gastrointestinal:  Negative for abdominal pain, blood in stool, constipation, diarrhea, nausea and vomiting.   Endocrine: Negative for polydipsia and polyuria.  "  Genitourinary:  Negative for dysuria, frequency and hematuria.   Musculoskeletal:  Negative for joint swelling, neck pain and neck stiffness.   Skin:  Negative for rash and wound.   Neurological:  Negative for weakness, numbness and headache.   Psychiatric/Behavioral:  Negative for suicidal ideas and depressed mood.      I have reviewed and confirmed the accuracy of the ROS as documented by the MA/LPN/RN Isac Long MD    Objective   Visit Vitals  /76 (BP Location: Right arm, Patient Position: Sitting, Cuff Size: Adult)   Pulse 65   Temp 98.9 °F (37.2 °C)   Resp 16   Ht 165.1 cm (65\")   Wt 72.7 kg (160 lb 3.2 oz)   LMP 01/01/1986   SpO2 96%   BMI 26.66 kg/m²      **  Physical Exam  Constitutional:       Appearance: She is well-developed.   HENT:      Head: Normocephalic and atraumatic.      Right Ear: External ear normal.      Left Ear: External ear normal.      Nose: Nose normal.   Eyes:      Pupils: Pupils are equal, round, and reactive to light.   Cardiovascular:      Rate and Rhythm: Normal rate and regular rhythm.      Heart sounds: Normal heart sounds.       with a grade of 2/6.      Comments: 2-3/6 Systolic murmur   Pulmonary:      Effort: Pulmonary effort is normal.      Breath sounds: Normal breath sounds.   Abdominal:      General: Bowel sounds are normal.      Palpations: Abdomen is soft.   Musculoskeletal:         General: Normal range of motion.      Cervical back: Normal range of motion and neck supple.   Skin:     General: Skin is warm and dry.   Neurological:      Mental Status: She is alert and oriented to person, place, and time.   Psychiatric:         Behavior: Behavior normal.         Thought Content: Thought content normal.         Judgment: Judgment normal.       Derm Physical Exam  Ortho Exam   Neurological Exam  Mental Status  Alert. Oriented to person, place, and time.    Cranial Nerves  CN III, IV, VI: Pupils equal round and reactive to light bilaterally.     Assessment & " Plan  Fever, unspecified fever cause    Orders:    POCT SARS-CoV-2 + Flu Antigen BRITTANY    azithromycin (ZITHROMAX) 250 MG tablet; Take 2 tablets the first day, then 1 tablet daily for 4 days.    Glossitis  Trial of nystatin especially since she will be on antibiotics   Orders:    nystatin (MYCOSTATIN) 100,000 unit/mL suspension; Swish and swallow 5 mL 4 (Four) Times a Day. 1/2 dose each side of mouth retain in mouth as long as possible before swallowing    Upper respiratory tract infection, unspecified type  Tx empirically   Orders:    azithromycin (ZITHROMAX) 250 MG tablet; Take 2 tablets the first day, then 1 tablet daily for 4 days.    Systolic murmur  ? Functional due to acute state today vs aortic stenosis. [facility] and further management as clinically indicated.        Findings discussed. All questions answered.  Differential diagnosis discussed.   Medication and medication adverse effects discussed.  Drug education given and explained to patient. Patient verbalized understanding.  Follow-up in 1 week if not better.  Follow-up sooner for worsening symptoms or for any concerns.   Follow-up for routine health maintenance as indicated.          Expected course, medications, and adverse effects discussed as appropriate.  Call or return if worsening or persistent symptoms.     This document is intended for medical professional use only.   Electronically signed by Isac Long MD on 05/01/2025. This content may not have been proofread.

## 2025-06-09 DIAGNOSIS — I10 HYPERTENSION, BENIGN: ICD-10-CM

## 2025-06-09 RX ORDER — HYDROCHLOROTHIAZIDE 25 MG/1
25 TABLET ORAL EVERY EVENING
Qty: 90 TABLET | Refills: 3 | Status: SHIPPED | OUTPATIENT
Start: 2025-06-09

## 2025-06-11 NOTE — PROGRESS NOTES
Subjective   The ABCs of the Annual Wellness Visit  Medicare Wellness Visit      Akosua Munguia is a 79 y.o. patient who presents for a Medicare Wellness Visit.    The following portions of the patient's history were reviewed and   updated as appropriate: allergies, current medications, past family history, past medical history, past social history, past surgical history, and problem list.    Compared to one year ago, the patient's physical health is worse.  Compared to one year ago, the patient's mental health is the same.    Recent Hospitalizations:  She was not admitted to the hospital during the last year.     Current Medical Providers:  Patient Care Team:  Lucien Crane MD as PCP - General (Family Medicine)  Alexandru Baker MD as Consulting Physician (Nephrology)  Banet, Duane Edward, MD as Consulting Physician (Dermatology)    Outpatient Medications Prior to Visit   Medication Sig Dispense Refill    aspirin 81 MG EC tablet Take 1 tablet by mouth Daily.      atenolol (TENORMIN) 25 MG tablet Take 1 tablet by mouth 2 (Two) Times a Day. 180 tablet 3    cetirizine (zyrTEC) 10 MG tablet Take 1 tablet by mouth Every Evening. 90 tablet 3    hydroCHLOROthiazide 25 MG tablet TAKE ONE TABLET BY MOUTH EVERY EVENING 90 tablet 3    lisinopril (PRINIVIL,ZESTRIL) 20 MG tablet TAKE ONE TABLET BY MOUTH EVERY EVENING 30 tablet 12    Mirabegron ER (MYRBETRIQ) 50 MG tablet sustained-release 24 hour 24 hr tablet Take 50 mg by mouth Every Other Day.      montelukast (SINGULAIR) 10 MG tablet TAKE ONE TABLET BY MOUTH ONCE NIGHTLY 30 tablet 12    omeprazole (priLOSEC) 20 MG capsule Take 1 capsule by mouth Daily. 90 capsule 4    rosuvastatin (CRESTOR) 5 MG tablet Take 1 tablet by mouth Daily. 90 tablet 3    vitamin B-12 (CYANOCOBALAMIN) 1000 MCG tablet Take 1 tablet by mouth Daily. 30 tablet 11    azithromycin (ZITHROMAX) 250 MG tablet Take 2 tablets the first day, then 1 tablet daily for 4 days. 6 tablet 0    nystatin (MYCOSTATIN)  100,000 unit/mL suspension Swish and swallow 5 mL 4 (Four) Times a Day. 1/2 dose each side of mouth retain in mouth as long as possible before swallowing 280 mL 0     No facility-administered medications prior to visit.     No opioid medication identified on active medication list. I have reviewed chart for other potential  high risk medication/s and harmful drug interactions in the elderly.      Aspirin is on active medication list. Aspirin use is indicated based on review of current medical condition/s. Pros and cons of this therapy have been discussed today. Benefits of this medication outweigh potential harm.  Patient has been encouraged to continue taking this medication.  .      Patient Active Problem List   Diagnosis    Chronic nonseasonal allergic rhinitis due to pollen    Asymptomatic menopause    Cervical disc disorder with myelopathy    Dense breast    Diverticulosis of large intestine without hemorrhage    Gastroesophageal reflux disease with stricture    Generalized osteoarthrosis    Hiatal hernia    History of kidney stones    Benign hypertension with chronic kidney disease    Lumbar disc disorder with myelopathy    Mixed hyperlipidemia    Osteopenia of multiple sites    Overweight (BMI 25.0-29.9)    Urinary incontinence in female    Medicare annual wellness visit, subsequent    Encounter for screening mammogram for malignant neoplasm of breast    Colon cancer screening    Cervical cancer screening    Elevated coronary artery calcium score    History of 2019 novel coronavirus disease (COVID-19)    Stage 3b chronic kidney disease    Primary osteoarthritis of left shoulder    Adhesive capsulitis of left shoulder    Benign paroxysmal positional vertigo due to bilateral vestibular disorder    Family history of breast cancer    Prediabetes    Vitamin B12 deficiency    Abnormal mouth sensation     Advance Care Planning Advance Directive is on file.  ACP discussion was held with the patient during this visit.  "Patient has an advance directive in EMR which is still valid.             Objective   Vitals:    25 1032   BP: 130/70   BP Location: Right arm   Patient Position: Sitting   Cuff Size: Adult   Pulse: 52   Resp: 18   Temp: 97.3 °F (36.3 °C)   TempSrc: Temporal   SpO2: 96%   Weight: 71.8 kg (158 lb 6.4 oz)   Height: 165.1 cm (65\")   PainSc: 0-No pain       Estimated body mass index is 26.36 kg/m² as calculated from the following:    Height as of this encounter: 165.1 cm (65\").    Weight as of this encounter: 71.8 kg (158 lb 6.4 oz).                Does the patient have evidence of cognitive impairment? No  Lab Results   Component Value Date    HGBA1C 6.0 (A) 2025                                                                                                Health  Risk Assessment    Smoking Status:  Social History     Tobacco Use   Smoking Status Never    Passive exposure: Never   Smokeless Tobacco Never   Tobacco Comments    No Smoke Exposure     Alcohol Consumption:  Social History     Substance and Sexual Activity   Alcohol Use Never       Fall Risk Screen  STEADI Fall Risk Assessment was completed, and patient is at LOW risk for falls.Assessment completed on:2025    Depression Screening   Little interest or pleasure in doing things? Not at all   Feeling down, depressed, or hopeless? Not at all   PHQ-2 Total Score 0      Health Habits and Functional and Cognitive Screenin/11/2025     6:27 PM   Functional & Cognitive Status   Do you have difficulty preparing food and eating? No   Do you have difficulty bathing yourself, getting dressed or grooming yourself? No   Do you have difficulty using the toilet? No   Do you have difficulty moving around from place to place? No   Do you have trouble with steps or getting out of a bed or a chair? No   Current Diet Well Balanced Diet   Dental Exam Up to date   Eye Exam Up to date   Exercise (times per week) 3 times per week   Current Exercises Include " House Cleaning;Walking;Yard Work   Do you need help using the phone?  No   Are you deaf or do you have serious difficulty hearing?  No   Do you need help to go to places out of walking distance? No   Do you need help shopping? No   Do you need help preparing meals?  No   Do you need help with housework?  No   Do you need help with laundry? No   Do you need help taking your medications? No   Do you need help managing money? No   Do you ever drive or ride in a car without wearing a seat belt? No   Have you felt unusual stress, anger or loneliness in the last month? No   Who do you live with? Spouse   If you need help, do you have trouble finding someone available to you? No   Have you been bothered in the last four weeks by sexual problems? No   Do you have difficulty concentrating, remembering or making decisions? No           Age-appropriate Screening Schedule:  Refer to the list below for future screening recommendations based on patient's age, sex and/or medical conditions. Orders for these recommended tests are listed in the plan section. The patient has been provided with a written plan.    Health Maintenance List  Health Maintenance   Topic Date Due    COLORECTAL CANCER SCREENING  05/07/2023    TDAP/TD VACCINES (4 - Td or Tdap) 11/05/2024    LIPID PANEL  05/23/2025    COVID-19 Vaccine (6 - 2024-25 season) 12/13/2025 (Originally 9/1/2024)    INFLUENZA VACCINE  07/01/2025    ANNUAL WELLNESS VISIT  06/13/2026    DXA SCAN  06/25/2026    HEPATITIS C SCREENING  Completed    RSV Vaccine - Adults  Completed    Pneumococcal Vaccine 50+  Completed    ZOSTER VACCINE  Completed    MAMMOGRAM  Discontinued                                                                                                                                                CMS Preventative Services Quick Reference  Risk Factors Identified During Encounter  Polypharmacy: Medication List reviewed and Medications are appropriate for patient    The above  risks/problems have been discussed with the patient.  Pertinent information has been shared with the patient in the After Visit Summary.  An After Visit Summary and PPPS were made available to the patient.    Follow Up:   Next Medicare Wellness visit to be scheduled in 1 year.         Additional E&M Note during same encounter follows:  Patient has additional, significant, and separately identifiable condition(s)/problem(s) that require work above and beyond the Medicare Wellness Visit     Chief Complaint  Medicare Wellness-subsequent, Hypertension, Hyperlipidemia, and Prediabetes    Subjective   HPI  Akosua is also being seen today for additional medical problem/s.          Patient presents for annual wellness examination, to discuss health maintenance and disease prevention. Feels well with no complaints. Activity level: moderate. Diet: well balanced.    Patient  reports no history of alcohol use.     Tobacco Use: Low Risk  (2025)    Patient History     Smoking Tobacco Use: Never     Smokeless Tobacco Use: Never     Passive Exposure: Never     Breast cancer screenin2024    Osteoporosis screenin2024 - normal bone density    Colorectal cancer screenin - recall 5 years    Hypertension  Patient does check blood pressure at home.   Home readings range from 109/55 to 146/65 per patient/patient submitted blood pressure diary.  Patient denies  blurred vision, chest pain, dyspnea, headache, neck aches, orthopnea, palpitations, paroxysmal nocturnal dyspnea, peripheral edema, pulsating in the ears, and tiredness/fatigue   Patient reports they are taking medications as prescribed and they are not having side effects.    Hyperlipidemia  Patient is following a low cholesterol diet.   Currently is on statin therapy.  Patient reports is exercising.  Patient reports they are taking medications as prescribed and they are not having side effects.    Prediabetes  Patient reports is not following low  "concentrated sweets diet.   Patient reports are not checking blood sugar at home.   Patient reports are exercising.   Last A1c was 6.1 on 01/31/2025.       Objective   Vital Signs:  /70 (BP Location: Right arm, Patient Position: Sitting, Cuff Size: Adult)   Pulse 52   Temp 97.3 °F (36.3 °C) (Temporal)   Resp 18   Ht 165.1 cm (65\")   Wt 71.8 kg (158 lb 6.4 oz)   SpO2 96%   BMI 26.36 kg/m²   Physical Exam  Vitals and nursing note reviewed.   Constitutional:       General: She is not in acute distress.     Appearance: Normal appearance. She is not ill-appearing or diaphoretic.   HENT:      Head: Normocephalic and atraumatic.      Right Ear: Tympanic membrane, ear canal and external ear normal.      Left Ear: Tympanic membrane, ear canal and external ear normal.      Nose: No congestion or rhinorrhea.      Mouth/Throat:      Mouth: Mucous membranes are moist.      Pharynx: Oropharynx is clear. No oropharyngeal exudate or posterior oropharyngeal erythema.   Eyes:      Extraocular Movements: Extraocular movements intact.      Pupils: Pupils are equal, round, and reactive to light.   Cardiovascular:      Rate and Rhythm: Normal rate and regular rhythm.      Pulses: Normal pulses.   Pulmonary:      Effort: Pulmonary effort is normal.      Breath sounds: Normal breath sounds.   Musculoskeletal:         General: Normal range of motion.      Cervical back: Normal range of motion and neck supple. No tenderness.   Lymphadenopathy:      Cervical: No cervical adenopathy.   Skin:     General: Skin is warm and dry.      Capillary Refill: Capillary refill takes less than 2 seconds.   Neurological:      Mental Status: She is alert and oriented to person, place, and time.      Cranial Nerves: No cranial nerve deficit.      Sensory: No sensory deficit.      Motor: No weakness.      Coordination: Coordination normal.      Gait: Gait normal.   Psychiatric:         Mood and Affect: Mood normal.         Behavior: Behavior " normal.              Assessment and Plan  Diagnoses and all orders for this visit:    1. Medicare annual wellness visit, subsequent (Primary)    2. Colon cancer screening  Overview:  Last colonoscopy 05/17/2018, polyps x2, recall 5 years.    Assessment & Plan:  She is overdue since 2023.  Is agreeable to updated colonoscopy.    Orders:  -     Ambulatory Referral to Colorectal Surgery    3. Encounter for screening mammogram for malignant neoplasm of breast  Overview:  Last mammogram 06/25/2024 heterogeneously dense, no mammographic signs of malignancy repeat in 1 year .    Orders:  -     Mammo Screening Digital Tomosynthesis Bilateral With CAD; Future    4. Benign hypertension with chronic kidney disease  Overview:  Regimen: Lisinopril 20 mg qd, hctz 25 mg qd, atenolol 25 mg bid    Controlled. Compliant.  Cr and GFR stable - following with nephrology.    Orders:  -     Comprehensive Metabolic Panel  -     TSH Rfx On Abnormal To Free T4  -     CBC (No Diff)    5. Mixed hyperlipidemia  Overview:  Regimen: Rosuvastatin 5 mg qd    Lab Results   Component Value Date    CHOL 216 (H) 01/28/2019    CHLPL 150 05/23/2024    TRIG 114 05/23/2024    HDL 57 05/23/2024    LDL 73 05/23/2024     Controlled. Compliant. Continue current regimen.      Myalgia w/ atorvastatin.    Orders:  -     Lipid Panel    6. Prediabetes  Overview:  A1c 6.0 06/13/2025  A1c 6.1 01/31/2025  A1c 6.1 06/11/2024    Counseled on diet, exercise, and weight loss, with specific dietary interventions discussed in detail.    Orders:  -     POC Glycosylated Hemoglobin (Hb A1C)  -     Cancel: Hemoglobin A1c    7. Overweight (BMI 25.0-29.9)  Assessment & Plan:  Patient's (Body mass index is 26.36 kg/m².) indicates that they are overweight with health conditions that include hypertension, impaired fasting glucose, and dyslipidemias . Weight is improving with lifestyle modifications. BMI is above average; BMI management plan is completed. We discussed low calorie,  low carb based diet program, portion control, and increasing exercise.       8. Abnormal mouth sensation  Comments:  And dry mouth. Persists. Folate and B12 WNL. Labs as below, refer to ENT for further evaluation.  Overview:  Started around 11/2024. Involves both bilateral lips and mouth. Patient notably had a borderline B12 <300 last year. We will recheck it as well as folate and get her started on a B12 supplement. Further evaluation if no improvement.    Orders:  -     LAMIN by IFA, Reflex 9-biomarkers profile  -     Zinc  -     Ambulatory Referral to ENT (Otolaryngology)    9. Dry mouth  -     LAMIN by IFA, Reflex 9-biomarkers profile  -     Zinc  -     Ambulatory Referral to ENT (Otolaryngology)    10. Bradycardia  Comments:  HR typically 50s-60s, can dip into 40s. Longstanding. No palpitations or presyncope, feels well. Last EKG sinus john. Recommended Holter, pt declines eval.         Patient was given instructions and counseling regarding her condition or for health maintenance advice. Please see specific information pulled into the AVS if appropriate.      Lucien Crane MD    NOTE: [facility], per Health Information accessibility laws, allows progress notes to be visible to patients. Please note that these notes will include professional medical terminology that may be somewhat confusing without some interpretation from your medical professional team. The intent of progress notes is to communicate information between any medical professionals involved in your case, or to serve as future reference for myself or any other provider when reviewing your case, as well as a reference for the patient viewing the record. Please ask a member of the medical team if you have any questions about terminology or content of note.    DISCLAIMER: Part of this note may be an electronic transcription/translation of spoken language to printed text using the Dragon Dictation System.

## 2025-06-13 ENCOUNTER — OFFICE VISIT (OUTPATIENT)
Dept: FAMILY MEDICINE CLINIC | Facility: CLINIC | Age: 80
End: 2025-06-13
Payer: MEDICARE

## 2025-06-13 VITALS
OXYGEN SATURATION: 96 % | WEIGHT: 158.4 LBS | RESPIRATION RATE: 18 BRPM | BODY MASS INDEX: 26.39 KG/M2 | SYSTOLIC BLOOD PRESSURE: 130 MMHG | HEIGHT: 65 IN | TEMPERATURE: 97.3 F | DIASTOLIC BLOOD PRESSURE: 70 MMHG | HEART RATE: 52 BPM

## 2025-06-13 DIAGNOSIS — Z12.11 COLON CANCER SCREENING: ICD-10-CM

## 2025-06-13 DIAGNOSIS — I12.9 BENIGN HYPERTENSION WITH CHRONIC KIDNEY DISEASE: ICD-10-CM

## 2025-06-13 DIAGNOSIS — Z00.00 MEDICARE ANNUAL WELLNESS VISIT, SUBSEQUENT: Primary | ICD-10-CM

## 2025-06-13 DIAGNOSIS — R00.1 BRADYCARDIA: ICD-10-CM

## 2025-06-13 DIAGNOSIS — E66.3 OVERWEIGHT (BMI 25.0-29.9): ICD-10-CM

## 2025-06-13 DIAGNOSIS — R44.9 ABNORMAL MOUTH SENSATION: ICD-10-CM

## 2025-06-13 DIAGNOSIS — E78.2 MIXED HYPERLIPIDEMIA: ICD-10-CM

## 2025-06-13 DIAGNOSIS — R73.03 PREDIABETES: ICD-10-CM

## 2025-06-13 DIAGNOSIS — R68.2 DRY MOUTH: ICD-10-CM

## 2025-06-13 DIAGNOSIS — Z12.31 ENCOUNTER FOR SCREENING MAMMOGRAM FOR MALIGNANT NEOPLASM OF BREAST: ICD-10-CM

## 2025-06-13 LAB
EXPIRATION DATE: ABNORMAL
HBA1C MFR BLD: 6 % (ref 4.5–5.7)
Lab: ABNORMAL

## 2025-06-13 NOTE — ASSESSMENT & PLAN NOTE
Patient's (Body mass index is 26.36 kg/m².) indicates that they are overweight with health conditions that include hypertension, impaired fasting glucose, and dyslipidemias . Weight is improving with lifestyle modifications. BMI is above average; BMI management plan is completed. We discussed low calorie, low carb based diet program, portion control, and increasing exercise.

## 2025-06-14 LAB
ALBUMIN SERPL-MCNC: 4.7 G/DL (ref 3.8–4.8)
ALP SERPL-CCNC: 65 IU/L (ref 44–121)
ALT SERPL-CCNC: 23 IU/L (ref 0–32)
AST SERPL-CCNC: 20 IU/L (ref 0–40)
BILIRUB SERPL-MCNC: 0.4 MG/DL (ref 0–1.2)
BUN SERPL-MCNC: 32 MG/DL (ref 8–27)
BUN/CREAT SERPL: 29 (ref 12–28)
CALCIUM SERPL-MCNC: 9.9 MG/DL (ref 8.7–10.3)
CHLORIDE SERPL-SCNC: 101 MMOL/L (ref 96–106)
CHOLEST SERPL-MCNC: 177 MG/DL (ref 100–199)
CO2 SERPL-SCNC: 24 MMOL/L (ref 20–29)
CREAT SERPL-MCNC: 1.12 MG/DL (ref 0.57–1)
EGFRCR SERPLBLD CKD-EPI 2021: 50 ML/MIN/1.73
ERYTHROCYTE [DISTWIDTH] IN BLOOD BY AUTOMATED COUNT: 13.2 % (ref 11.7–15.4)
GLOBULIN SER CALC-MCNC: 2.4 G/DL (ref 1.5–4.5)
GLUCOSE SERPL-MCNC: 101 MG/DL (ref 70–99)
HCT VFR BLD AUTO: 41 % (ref 34–46.6)
HDLC SERPL-MCNC: 61 MG/DL
HGB BLD-MCNC: 14.3 G/DL (ref 11.1–15.9)
LDLC SERPL CALC-MCNC: 92 MG/DL (ref 0–99)
MCH RBC QN AUTO: 34.1 PG (ref 26.6–33)
MCHC RBC AUTO-ENTMCNC: 34.9 G/DL (ref 31.5–35.7)
MCV RBC AUTO: 98 FL (ref 79–97)
PLATELET # BLD AUTO: 214 X10E3/UL (ref 150–450)
POTASSIUM SERPL-SCNC: 4.2 MMOL/L (ref 3.5–5.2)
PROT SERPL-MCNC: 7.1 G/DL (ref 6–8.5)
RBC # BLD AUTO: 4.19 X10E6/UL (ref 3.77–5.28)
SODIUM SERPL-SCNC: 141 MMOL/L (ref 134–144)
TRIGL SERPL-MCNC: 139 MG/DL (ref 0–149)
TSH SERPL DL<=0.005 MIU/L-ACNC: 1.84 UIU/ML (ref 0.45–4.5)
VLDLC SERPL CALC-MCNC: 24 MG/DL (ref 5–40)
WBC # BLD AUTO: 5.3 X10E3/UL (ref 3.4–10.8)

## 2025-06-17 LAB
ANA SER QL IF: NEGATIVE
LABORATORY COMMENT REPORT: NORMAL

## 2025-06-19 LAB — ZINC SERPL-MCNC: 64 UG/DL (ref 44–115)

## 2025-06-25 LAB
NCCN CRITERIA FLAG: NORMAL
TYRER CUZICK SCORE: 4.1

## 2025-06-30 ENCOUNTER — HOSPITAL ENCOUNTER (OUTPATIENT)
Dept: MAMMOGRAPHY | Facility: HOSPITAL | Age: 80
Discharge: HOME | End: 2025-06-30
Payer: MEDICARE

## 2025-06-30 DIAGNOSIS — Z12.31 ENCOUNTER FOR SCREENING MAMMOGRAM FOR MALIGNANT NEOPLASM OF BREAST: ICD-10-CM

## 2025-06-30 PROCEDURE — 77063 BREAST TOMOSYNTHESIS BI: CPT

## 2025-06-30 PROCEDURE — 77067 SCR MAMMO BI INCL CAD: CPT

## 2025-07-15 ENCOUNTER — PREP FOR SURGERY (OUTPATIENT)
Dept: OTHER | Facility: HOSPITAL | Age: 80
End: 2025-07-15
Payer: MEDICARE

## 2025-07-15 DIAGNOSIS — Z12.11 ENCOUNTER FOR SCREENING COLONOSCOPY: Primary | ICD-10-CM

## 2025-07-16 PROBLEM — Z12.11 ENCOUNTER FOR SCREENING COLONOSCOPY: Status: ACTIVE | Noted: 2025-07-15

## 2025-07-16 RX ORDER — SODIUM CHLORIDE 9 MG/ML
30 INJECTION, SOLUTION INTRAVENOUS CONTINUOUS PRN
OUTPATIENT
Start: 2025-07-16 | End: 2025-07-17

## 2025-07-24 DIAGNOSIS — E78.2 MIXED HYPERLIPIDEMIA: ICD-10-CM

## 2025-07-25 RX ORDER — ROSUVASTATIN CALCIUM 5 MG/1
5 TABLET, COATED ORAL DAILY
Qty: 90 TABLET | Refills: 3 | Status: SHIPPED | OUTPATIENT
Start: 2025-07-25

## 2025-08-02 DIAGNOSIS — K22.2 GASTROESOPHAGEAL REFLUX DISEASE WITH STRICTURE: ICD-10-CM

## 2025-08-02 DIAGNOSIS — I10 HYPERTENSION, BENIGN: ICD-10-CM

## 2025-08-02 DIAGNOSIS — K21.9 GASTROESOPHAGEAL REFLUX DISEASE WITH STRICTURE: ICD-10-CM

## 2025-08-04 RX ORDER — ATENOLOL 25 MG/1
25 TABLET ORAL 2 TIMES DAILY
Qty: 180 TABLET | Refills: 3 | Status: SHIPPED | OUTPATIENT
Start: 2025-08-04

## 2025-08-04 RX ORDER — OMEPRAZOLE 20 MG/1
20 CAPSULE, DELAYED RELEASE ORAL DAILY
Qty: 90 CAPSULE | Refills: 3 | Status: SHIPPED | OUTPATIENT
Start: 2025-08-04

## 2025-08-25 DIAGNOSIS — I10 HYPERTENSION, BENIGN: ICD-10-CM

## 2025-08-25 RX ORDER — LISINOPRIL 20 MG/1
20 TABLET ORAL EVERY EVENING
Qty: 30 TABLET | Refills: 12 | Status: SHIPPED | OUTPATIENT
Start: 2025-08-25